# Patient Record
Sex: MALE | Race: BLACK OR AFRICAN AMERICAN | NOT HISPANIC OR LATINO | Employment: FULL TIME | URBAN - METROPOLITAN AREA
[De-identification: names, ages, dates, MRNs, and addresses within clinical notes are randomized per-mention and may not be internally consistent; named-entity substitution may affect disease eponyms.]

---

## 2017-08-23 ENCOUNTER — ALLSCRIPTS OFFICE VISIT (OUTPATIENT)
Dept: OTHER | Facility: OTHER | Age: 57
End: 2017-08-23

## 2017-08-23 ENCOUNTER — GENERIC CONVERSION - ENCOUNTER (OUTPATIENT)
Dept: OTHER | Facility: OTHER | Age: 57
End: 2017-08-23

## 2017-08-23 LAB
A/G RATIO (HISTORICAL): 1.8 (ref 1.2–2.2)
ALBUMIN SERPL BCP-MCNC: 4.6 G/DL (ref 3.5–5.5)
ALP SERPL-CCNC: 67 IU/L (ref 39–117)
ALT SERPL W P-5'-P-CCNC: 30 IU/L (ref 0–44)
AMYLASE (HISTORICAL): 110 U/L (ref 31–124)
AST SERPL W P-5'-P-CCNC: 27 IU/L (ref 0–40)
BASOPHILS # BLD AUTO: 0 %
BASOPHILS # BLD AUTO: 0 X10E3/UL (ref 0–0.2)
BILIRUB SERPL-MCNC: 0.7 MG/DL (ref 0–1.2)
BILIRUB UR QL STRIP: NORMAL
BUN SERPL-MCNC: 9 MG/DL (ref 6–24)
BUN/CREA RATIO (HISTORICAL): 8 (ref 9–20)
CALCIUM SERPL-MCNC: 9.6 MG/DL (ref 8.7–10.2)
CHLORIDE SERPL-SCNC: 101 MMOL/L (ref 96–106)
CHOLEST SERPL-MCNC: 232 MG/DL (ref 100–199)
CHOLEST/HDLC SERPL: 5 RATIO UNITS (ref 0–5)
CLARITY UR: NORMAL
CO2 SERPL-SCNC: 24 MMOL/L (ref 18–29)
COLOR UR: YELLOW
CREAT SERPL-MCNC: 1.06 MG/DL (ref 0.76–1.27)
DEPRECATED RDW RBC AUTO: 13.7 % (ref 12.3–15.4)
EGFR AFRICAN AMERICAN (HISTORICAL): 91 ML/MIN/1.73
EGFR-AMERICAN CALC (HISTORICAL): 79 ML/MIN/1.73
EOSINOPHIL # BLD AUTO: 0 %
EOSINOPHIL # BLD AUTO: 0 X10E3/UL (ref 0–0.4)
GLUCOSE (HISTORICAL): NORMAL
GLUCOSE SERPL-MCNC: 101 MG/DL (ref 65–99)
HCT VFR BLD AUTO: 44.1 % (ref 37.5–51)
HDLC SERPL-MCNC: 46 MG/DL
HGB BLD-MCNC: 15.2 G/DL (ref 12.6–17.7)
HGB UR QL STRIP.AUTO: NORMAL
IMM.GRANULOCYTES (CD4/8) (HISTORICAL): 0 %
IMM.GRANULOCYTES (CD4/8) (HISTORICAL): 0 X10E3/UL (ref 0–0.1)
KETONES UR STRIP-MCNC: NORMAL MG/DL
LDLC SERPL CALC-MCNC: 161 MG/DL (ref 0–99)
LEUKOCYTE ESTERASE UR QL STRIP: NORMAL
LIPASE SERPL-CCNC: 64 U/L (ref 0–59)
LYMPHOCYTES # BLD AUTO: 2.3 X10E3/UL (ref 0.7–3.1)
LYMPHOCYTES # BLD AUTO: 48 %
MAGNESIUM SERPL-MCNC: 2.2 MG/DL (ref 1.6–2.3)
MCH RBC QN AUTO: 29.2 PG (ref 26.6–33)
MCHC RBC AUTO-ENTMCNC: 34.5 G/DL (ref 31.5–35.7)
MCV RBC AUTO: 85 FL (ref 79–97)
MONOCYTES # BLD AUTO: 0.4 X10E3/UL (ref 0.1–0.9)
MONOCYTES (HISTORICAL): 9 %
NEUTROPHILS # BLD AUTO: 2.1 X10E3/UL (ref 1.4–7)
NEUTROPHILS # BLD AUTO: 43 %
NITRITE UR QL STRIP: NORMAL
PH UR STRIP.AUTO: 5 [PH]
PLATELET # BLD AUTO: 218 X10E3/UL (ref 150–379)
POTASSIUM SERPL-SCNC: 4.5 MMOL/L (ref 3.5–5.2)
PROSTATE SPECIFIC ANTIGEN (HISTORICAL): 3.1 NG/ML (ref 0–4)
PROT UR STRIP-MCNC: NORMAL MG/DL
RBC (HISTORICAL): 5.21 X10E6/UL (ref 4.14–5.8)
SODIUM SERPL-SCNC: 140 MMOL/L (ref 134–144)
SP GR UR STRIP.AUTO: 1.01
TOT. GLOBULIN, SERUM (HISTORICAL): 2.6 G/DL (ref 1.5–4.5)
TOTAL PROTEIN (HISTORICAL): 7.2 G/DL (ref 6–8.5)
TRIGL SERPL-MCNC: 125 MG/DL (ref 0–149)
UROBILINOGEN UR QL STRIP.AUTO: NORMAL
VLDLC SERPL CALC-MCNC: 25 MG/DL (ref 5–40)
WBC # BLD AUTO: 4.8 X10E3/UL (ref 3.4–10.8)

## 2017-08-24 LAB
INTERPRETATION (HISTORICAL): NORMAL
TSH SERPL DL<=0.05 MIU/L-ACNC: 2.2 UIU/ML (ref 0.45–4.5)

## 2017-08-26 ENCOUNTER — GENERIC CONVERSION - ENCOUNTER (OUTPATIENT)
Dept: OTHER | Facility: OTHER | Age: 57
End: 2017-08-26

## 2017-08-26 LAB
TESTOSTERONE FREE (HISTORICAL): 8.5 PG/ML (ref 7.2–24)
TESTOSTERONE TOTAL (HISTORICAL): 448 NG/DL (ref 264–916)

## 2017-12-22 ENCOUNTER — ALLSCRIPTS OFFICE VISIT (OUTPATIENT)
Dept: OTHER | Facility: OTHER | Age: 57
End: 2017-12-22

## 2017-12-26 ENCOUNTER — APPOINTMENT (OUTPATIENT)
Dept: RADIOLOGY | Facility: CLINIC | Age: 57
End: 2017-12-26
Payer: COMMERCIAL

## 2017-12-26 ENCOUNTER — TRANSCRIBE ORDERS (OUTPATIENT)
Dept: RADIOLOGY | Facility: CLINIC | Age: 57
End: 2017-12-26

## 2017-12-26 ENCOUNTER — GENERIC CONVERSION - ENCOUNTER (OUTPATIENT)
Dept: OTHER | Facility: OTHER | Age: 57
End: 2017-12-26

## 2017-12-26 DIAGNOSIS — R76.11 NONSPECIFIC REACTION TO TUBERCULIN SKIN TEST WITHOUT ACTIVE TUBERCULOSIS: ICD-10-CM

## 2017-12-26 PROCEDURE — 71020 HB CHEST X-RAY 2VW FRONTAL&LATL: CPT

## 2018-01-08 ENCOUNTER — ALLSCRIPTS OFFICE VISIT (OUTPATIENT)
Dept: OTHER | Facility: OTHER | Age: 58
End: 2018-01-08

## 2018-01-09 NOTE — PROGRESS NOTES
Assessment   1  Positive PPD (795 51) (R76 11)   2  Benign essential hypertension (401 1) (I10)   3  Body mass index (BMI) of 30 0 to 39 9 (278 00)    Plan   Benign essential hypertension    · AmLODIPine Besylate 5 MG Oral Tablet; 1 every day   · Begin or continue regular aerobic exercise  Gradually work up to at least 3 sessions of    30 minutes of exercise a week ; Status:Complete;   Done: 89TYC8739   · Keep a diary of when and what you eat ; Status:Complete;   Done: 81JVY8329   · Limit your use of alcohol to 2 drinks or cans of beer a day ; Status:Complete;   Done:    31NSC9228   · Some eating tips that can help you lose weight ; Status:Complete;   Done: 24WVC8578   · Take your blood pressure twice a week  Record the numbers and bring them with you to    your appointments ; Status:Complete;   Done: 32EGW6325   · You need to quit smoking ; Status:Complete;   Done: 29YIQ8369   · Call [phone] if: You become dizzy or lightheaded, especially when you stand up    after sitting for a while ; Status:Complete;   Done: 74QWE6988   · Call [phone] if: You develop double vision (see two of everything) ;    Status:Complete;   Done: 63CJF4495   · Call [phone] if: Your blood pressure is frequently higher than 140/90 ;    Status:Complete;   Done: 18REQ5588   · Call 911 if: You experience a new kind of chest pain (angina) or pressure ;    Status:Complete;   Done: 31SUG7378   · Call 911 if: You have any symptoms of a stroke ; Status:Complete;   Done: 95THL7926   · Seek Immediate Medical Attention if: You have a severe headache that will not go away ;    Status:Complete;   Done: 71QKA6200   · Seek Immediate Medical Attention if: Your blood pressure is greater than 250/120 for 2    consecutive readings ; Status:Complete;   Done: 10CHV3118    Discussion/Summary      Cxr neg-copy given patient for records and employer if needed      Possible side effects of new medications were reviewed with the patient/guardian today  The treatment plan was reviewed with the patient/guardian  The patient/guardian understands and agrees with the treatment plan      Chief Complaint   pt here to discuss xrays  tc/cma      History of Present Illness   57 yo pt -follow-up on cxr--ordered sec to +PPD/hx htn    Pt asymptomatic--no fever, no nigh tsweats, no cough  known exposure--done as part of possible pre-employment  Pt non-smoker  Active Problems   1  Abnormal glucose (790 29) (R73 09)   2  Accelerated essential hypertension (401 0) (I10)   3  Anxiety (300 00) (F41 9)   4  Benign essential hypertension (401 1) (I10)   5  Body mass index (BMI) of 30 0 to 39 9 (278 00)   6  Diabetes mellitus type II, controlled (250 00) (E11 9)   7  Encounter for PPD test (V74 1) (Z11 1)   8  Erectile dysfunction (607 84) (N52 9)   9  Hyperlipidemia (272 4) (E78 5)   10  Obesity (278 00) (E66 9)   11  Positive PPD (795 51) (R76 11)    Family History   Mother    1  Family history of hypertension (V17 49) (Z82 49)  Father    2  Family history of ESRD (end stage renal disease)   3  Family history of dementia (V17 2) (Z81 8)   4  Family history of diabetes mellitus (V18 0) (Z83 3)    Social History    ·    · Never a smoker   · No alcohol use   · No drug use    Current Meds    1  AmLODIPine Besylate 5 MG Oral Tablet; 1 every day; Last Rx:72Xrf1693 Ordered   2  Centrum Ultra Mens Oral Tablet; Therapy: (Recorded:90Sbb2525) to Recorded   3  Cialis 10 MG Oral Tablet; take 1 tablet 1 hour before activity as needed; Therapy: 12Yzq3395 to (Last [place])  Requested for: 85Avu2336 Ordered     The medication list was reviewed and updated today  Allergies   1   Zithromax Z-Samir CAPS    Vitals   Vital Signs    Recorded: 86GVX5861 11:14AM Recorded: 40YAK0118 10:57AM   Temperature  97 6 F, Temporal   Heart Rate  78, L Radial   Pulse Quality  Normal, L Radial   Respiration Quality  Normal   Respiration  16   Systolic 050, LUE, Sitting 142, LUE, Sitting   Diastolic 86, LUE, Sitting 92, LUE, Sitting   BP CUFF SIZE Large    Height  5 ft 10 5 in   Weight  235 lb    BMI Calculated  33 24   BSA Calculated  2 25     Physical Exam        Constitutional      General appearance: No acute distress, well appearing and well nourished  overweight  Pulmonary      Respiratory effort: No increased work of breathing or signs of respiratory distress  Auscultation of lungs: Clear to auscultation, equal breath sounds bilaterally, no wheezes, no rales, no rhonci  Cardiovascular      Auscultation of heart: Normal rate and rhythm, normal S1 and S2, without murmurs  Neurologic      Cranial nerves: Cranial nerves 2-12 intact         Psychiatric      Orientation to person, place and time: Normal           Signatures    Electronically signed by : SONIDO Collins ; Jan 8 2018  5:36PM EST                       (Author)

## 2018-01-12 VITALS
RESPIRATION RATE: 16 BRPM | HEART RATE: 76 BPM | SYSTOLIC BLOOD PRESSURE: 124 MMHG | HEIGHT: 71 IN | TEMPERATURE: 97.8 F | DIASTOLIC BLOOD PRESSURE: 84 MMHG | WEIGHT: 231 LBS | BODY MASS INDEX: 32.34 KG/M2

## 2018-01-12 NOTE — RESULT NOTES
Verified Results  (1) TESTOSTERONE, FREE (DIRECT) AND TOTAL 46Qzu7006 11:57AM Jennifer Nuno     Test Name Result Flag Reference   Testosterone, Total, LC/ 0 ng/dL  264 0-916 0   This [facility] LC/MS-MS method is currently certified by the [facility]  Hormone Standardization Program (HoSt)  Adult male reference  interval is based on a population of healthy nonobese males  (BMI <30) between 23and 44years old  [address] [place] [address], [address] [place] [address]  [phone]  PMID: [id number]  This test was developed and its performance characteristics  determined by [facility]  It has not been cleared or approved  by the Food and Drug Administration     Free Testosterone(Direct) 8 5 pg/mL  7 2-24 0

## 2018-01-12 NOTE — PROGRESS NOTES
Assessment    1  Encounter for annual physical exam (V70 0) (Z00 00)   2  Body mass index (BMI) of 30 0 to 39 9 (278 00) (E66 9)   3  Hyperlipidemia (272 4) (E78 5)   4  Benign essential hypertension (401 1) (I10)    Plan  Abnormal glucose, Anxiety, Benign essential hypertension, Hyperlipidemia    · (1) AMYLASE; Status:Active; Requested for:23Ppa6282;    · (1) CBC/PLT/DIFF; Status:Active; Requested for:83Sex9507;    · (1) COMPREHENSIVE METABOLIC PANEL; Status:Active; Requested for:17Eyt3750;    · (1) HEMOGLOBIN A1C; Status:Active; Requested for:50Cbt7293;    · (1) LIPASE; Status:Active; Requested for:59Rel0237;    · (1) LIPID PANEL, FASTING; Status:Active; Requested for:24Fuf5077;    · (1) TSH; Status:Active; Requested ZONIA:19TKA6723; Abnormal glucose, Anxiety, Benign essential hypertension, Hyperlipidemia, Screening  for prostate cancer    · (1) PSA (SCREEN) (Dx V76 44 Screen for Prostate Cancer); Status:Active; Requested  BMP:48VYG2893; Health Maintenance    · Urine Dip Automated- POC; Status:Complete;   Done: 91DMD2198 11:14AM  Screening for colon cancer    · 1 Ramona Stoll MD, [facility] (Gastroenterology) Physician Referral  Consult  Status: Active   Requested for: 43WAC1811  Care Summary provided  : Yes    Discussion/Summary  Impression: health maintenance visit  Currently, he eats an adequate diet and has an adequate exercise regimen  Prostate cancer screening: the risks and benefits of prostate cancer screening were discussed  Testicular cancer screening: the risks and benefits of testicular cancer screening were discussed  Colorectal cancer screening: the risks and benefits of colorectal cancer screening were discussed  Screening lab work includes glucose, lipid profile and urinalysis  The risks and benefits of immunizations were discussed  Advice and education were given regarding weight loss, cardiovascular risk reduction and advanced directive planning  Patient discussion: discussed with the patient  Possible side effects of new medications were reviewed with the patient/guardian today  Chief Complaint  pt here for PE  tc/cma      History of Present Illness  HM, Adult Male: The patient is being seen for a health maintenance evaluation  General Health: The patient's health since the last visit is described as good  He has regular dental visits  He complains of vision problems  He denies hearing loss  Lifestyle:  He has weight concerns  He exercises regularly  He does not use tobacco  He denies alcohol use  He denies drug use  Reproductive health:  the patient is sexually active  Screening: cancer screening reviewed and updated  metabolic screening reviewed and updated  risk screening reviewed and updated  Review of Systems    The patient presents with complaints of gradual onset of 20 - 29 pound recent weight loss (intentional)  Eyes: eyesight problems  ENT: no complaints of earache, no hearing loss, no nosebleeds, no nasal discharge, no sore throat, no hoarseness  Cardiovascular: No complaints of slow heart rate, no fast heart rate, no chest pain, no palpitations, no leg claudication, no lower extremity  Respiratory: No complaints of shortness of breath, no wheezing, no cough, no SOB on exertion, no orthopnea or PND  Gastrointestinal: No complaints of abdominal pain, no constipation, no nausea or vomiting, no diarrhea or bloody stools  Genitourinary: No complaints of dysuria, no incontinence, no hesitancy, no nocturia, no genital lesion, no testicular pain  Musculoskeletal: arthralgias and myalgias  Integumentary: No complaints of skin rash or skin lesions, no itching, no skin wound, no dry skin  Neurological: No compliants of headache, no confusion, no convulsions, no numbness or tingling, no dizziness or fainting, no limb weakness, no difficulty walking  Psychiatric: anxiety and sleep disturbances     Endocrine: No complaints of proptosis, no hot flashes, no muscle weakness, no erectile dysfunction, no deepening of the voice, no feelings of weakness  Hematologic/Lymphatic: No complaints of swollen glands, no swollen glands in the neck, does not bleed easily, no easy bruising  Active Problems    1  Abnormal glucose (790 29) (R73 09)   2  Anxiety (300 00) (F41 9)   3  Benign essential hypertension (401 1) (I10)   4  Body mass index (BMI) of 30 0 to 39 9 (278 00) (E66 9)   5  Diabetes mellitus type II, controlled (250 00) (E11 9)   6  Encounter for annual physical exam (V70 0) (Z00 00)   7  Hyperlipidemia (272 4) (E78 5)   8  Obesity (278 00) (E66 9)   9  Screening for colon cancer (V76 51) (Z12 11)   10  Screening for prostate cancer (V76 44) (Z12 5)    Past Medical History    · History of Obesity, Class II, BMI 35 0-39 9, with comorbidity (see actual BMI) (278 01)  (E66 01)    Family History  Mother    · Family history of hypertension (V17 49) (Z82 49)  Father    · Family history of ESRD (end stage renal disease)   · Family history of dementia (V17 2) (Z81 8)   · Family history of diabetes mellitus (V18 0) (Z83 3)    Social History    ·    · Never a smoker   · No alcohol use   · No drug use    Current Meds   1  Centrum Ultra Mens Oral Tablet; Therapy: (Recorded:84Kpo4076) to Recorded    Allergies    1  Zithromax Z-Samir CAPS    Vitals   Recorded: 97WKM1148 10:59AM   Temperature 97 8 F, Tympanic   Heart Rate 80, R Radial   Pulse Quality Regular, R Radial   Respiration 16   Respiration Quality Normal   Systolic 640, RUE, Sitting   Diastolic 86, RUE, Sitting   Height 5 ft 10 5 in   Weight 213 lb    BMI Calculated 30 13   BSA Calculated 2 16     Physical Exam    Constitutional   General appearance: No acute distress, well appearing and well nourished  overweight  Eyes   Conjunctiva and lids: No erythema, swelling or discharge  Pupils and irises: Equal, round, reactive to light      Ears, Nose, Mouth, and Throat   External inspection of ears and nose: Normal  Otoscopic examination: Tympanic membranes translucent with normal light reflex  Canals patent without erythema  Hearing: Normal     Nasal mucosa, septum, and turbinates: Normal without edema or erythema  Lips, teeth, and gums: Normal, good dentition  Oropharynx: Normal with no erythema, edema, exudate or lesions  Neck   Neck: Supple, symmetric, trachea midline, no masses  Thyroid: Normal, no thyromegaly  Pulmonary   Respiratory effort: No increased work of breathing or signs of respiratory distress  Auscultation of lungs: Clear to auscultation  Cardiovascular   Auscultation of heart: Normal rate and rhythm, normal S1 and S2, no murmurs  Pedal pulses: 2+ bilaterally  Examination of extremities for edema and/or varicosities: Normal     Abdomen   Abdomen: Non-tender, no masses  Liver and spleen: No hepatomegaly or splenomegaly  Examination for hernias: No hernias appreciated  Lymphatic   Palpation of lymph nodes in neck: No lymphadenopathy  Palpation of lymph nodes in axillae: No lymphadenopathy  Musculoskeletal   Gait and station: Normal     Inspection/palpation of digits and nails: Normal without clubbing or cyanosis  Inspection/palpation of joints, bones, and muscles: Normal     Range of motion: Normal     Stability: Normal     Muscle strength/tone: Normal     Skin   Skin and subcutaneous tissue: Normal without rashes or lesions  Neurologic   Cranial nerves: Cranial nerves 2-12 intact  Reflexes: 2+ and symmetric  Sensation: No sensory loss  Psychiatric   Judgment and insight: Normal     Orientation to person, place and time: Normal     Recent and remote memory: Intact      Mood and affect: Normal        Results/Data  Urine Dip Automated- POC 34TIS2056 11:14AM [facility]     Test Name Result Flag Reference   Color Clear     Clarity Transparent     Leukocytes neg     Nitrite neg     Blood neg     Bilirubin neg     Urobilinogen norm     Protein neg Ph 5     Specific Gravity 1 020     Ketone neg     Glucose norm       Prime MD Depression Screening 63JCA0460 11:04AM User, Ahs     Test Name Result Flag Reference   PRIME-MD Depression Screening 0/9 - Likely not MD     Depressed mood: No  Loss of interest: No       Procedure    Procedure:  pt goes to eye DR yearly        Signatures   Electronically signed by : SONIDO Esqueda ; May  8 2016  8:31PM EST                       (Author)

## 2018-01-13 NOTE — PROGRESS NOTES
Assessment    1  Encounter for preventive health examination (V70 0) (Z00 00)   2  Benign essential hypertension (401 1) (I10)   3  Hyperlipidemia (272 4) (E78 5)   4  Erectile dysfunction (607 84) (N52 9)   5  Anxiety (300 00) (F41 9)   6  Body mass index (BMI) of 30 0 to 39 9 (278 00)   7  Screening for colon cancer (V76 51) (Z12 11)    Plan   Erectile dysfunction    · Viagra 50 MG Oral Tablet   · Cialis 10 MG Oral Tablet; take 1 tablet 1 hour before activity as needed  Health Maintenance    · *VB - Foot Exam; Status:Complete;   Done: 49OJE7054 09:40AM   · Urine Dip Automated- POC; Status:Complete;   Done: 79IQH1099 09:48AM  Screening for colon cancer    · (1) COLOGUARD; Status:Active; Requested for:33Mix0016;   cont  : I authorize [address] to obtain reimbursement for      Cologuard and to directly contact and collect a second sample from the paient      if reportable results are not obtained from the initial sample   cont  : I accept responsibility for maintaining the privacy of test results and      related information as required by HIPAA   : By ordering Cologuard, I certify that I am a licensed medical professional      authorized to order Cologuard  I acknowledge that the test is medically necessary and      that the patient is eligible to use Cologuard  AmLODIPine Besylate 5 MG Oral Tablet; 1 every day; Last Rx:26Rng2510; Status: ACTIVE Ordered  Rx By: Erroll Pencil; Dispense: 0 Days ; #:90 Tablet; Refill: 1;   For: Benign essential hypertension; ROBIN = N; Call Rx  (1) HEMOGLOBIN A1C; Status:Resulted - Requires Verification;   Done: 65YTE1311 12:00AM  WOJ:42FTC8334; Ordered; For:Diabetes mellitus type II, controlled; Ordered By:Margy Godoy;   (1) AMYLASE; Status:Resulted - Requires Verification;   Done: 99WXP9709 12:00AM  QMC:38WPW3759; Ordered; For:Anxiety, Benign essential hypertension, Health Maintenance, Erectile dysfunction, Hyperlipidemia, Obesity;  Ordered Juanita Mitchell;   (1) CBC/PLT/DIFF; Status:Resulted - Requires Verification;   Done: 86MXV8455 12:00AM  EMZ:16SVP2222; Ordered; For:Anxiety, Benign essential hypertension, Health Maintenance, Erectile dysfunction, Hyperlipidemia, Obesity; Ordered By:Letty Godoy;   (1) COMPREHENSIVE METABOLIC PANEL; Status:Resulted - Requires Verification;   Done: 32MOC8304 12:00AM  WNA:38SBO3347; Ordered; For:Anxiety, Benign essential hypertension, Health Maintenance, Erectile dysfunction, Hyperlipidemia, Obesity; Ordered By:Margy Godoy;   (1) LIPASE; Status:Resulted - Requires Verification;   Done: 74LYU8431 12:00AM  CER:39WXB8177; Ordered; For:Anxiety, Benign essential hypertension, Health Maintenance, Erectile dysfunction, Hyperlipidemia, Obesity; Ordered By:Margy Godoy;   (1) LIPID PANEL, FASTING; Status:Resulted - Requires Verification;   Done: 50HKA3933 12:00AM  WNE:62NSY3523; Ordered; For:Anxiety, Benign essential hypertension, Health Maintenance, Erectile dysfunction, Hyperlipidemia, Obesity; Ordered By:Margy Godoy;   (1) MAGNESIUM; Status:Resulted - Requires Verification;   Done: 87LFX9522 12:00AM  ZWV:52ANF4317; Ordered; For:Anxiety, Benign essential hypertension, Health Maintenance, Erectile dysfunction, Hyperlipidemia, Obesity; Ordered By:Margy Godoy;   (1) PSA (SCREEN) (Dx V76 44 Screen for Prostate Cancer); Status:Resulted - Requires Verification;   Done: 65TDB4014 12:00AM  UNS:55SWA6551; Ordered; For:Anxiety, Benign essential hypertension, Health Maintenance, Erectile dysfunction, Hyperlipidemia, Obesity, Screening for prostate cancer; Ordered By:Margy Godoy;   (1) TESTOSTERONE, FREE (DIRECT) AND TOTAL; Status:Resulted - Requires Verification;   Done: 50HWF5264 12:00AM  Due:46Gfe1375; Ordered; For:Anxiety, Benign essential hypertension, Health Maintenance, Erectile dysfunction, Hyperlipidemia, Obesity;  Ordered By:Margy Godoy;   (1) TSH WITH FT4 REFLEX; Status:Resulted - Requires Verification;   Done: 40NMD0830 12:00AM  RUE:82PMG8284; Ordered; For:Anxiety, Benign essential hypertension, Health Maintenance, Erectile dysfunction, Hyperlipidemia, Obesity; Ordered By:Zulay Godoy;      Discussion/Summary  health maintenance visit eats an adequate diet Currently, he has an adequate exercise regimen  Prostate cancer screening: the risks and benefits of prostate cancer screening were discussed  Testicular cancer screening: the risks and benefits of testicular cancer screening were discussed  Colorectal cancer screening: the risks and benefits of colorectal cancer screening were discussed  Screening lab work includes glucose, 25-hydroxyvitamin D, urinalysis and lipid profile  The risks and benefits of immunizations were discussed  Advice and education were given regarding cardiovascular risk reduction, seat belt use and weight loss  Patient discussion: discussed with the patient  The patient was counseled on Hepatitis C screening  Possible side effects of new medications were reviewed with the patient/guardian today  The treatment plan was reviewed with the patient/guardian  The patient/guardian understands and agrees with the treatment plan      Chief Complaint  pt here for PE need refill  tc/cma      History of Present Illness  HM, Adult Male: The patient is being seen for a health maintenance evaluation  The last health maintenance visit was 1 year(s) ago  General Health: The patient's health since the last visit is described as fair  He has regular dental visits  He complains of vision problems  Lifestyle:  He has weight concerns  He does not exercise regularly  He does not use tobacco  He denies alcohol use  He denies drug use  Reproductive health:  the patient is sexually active  He complains of erectile dysfunction  Screening: cancer screening reviewed and updated  metabolic screening reviewed and updated     risk screening reviewed and updated  Review of Systems    Constitutional: feeling tired  Eyes: eyesight problems  ENT: no complaints of earache, no hearing loss, no nosebleeds, no nasal discharge, no sore throat, no hoarseness  Cardiovascular: htn  Respiratory: No complaints of shortness of breath, no wheezing, no cough, no SOB on exertion, no orthopnea or PND  Gastrointestinal: No complaints of abdominal pain, no constipation, no nausea or vomiting, no diarrhea or bloody stools  Genitourinary: No complaints of dysuria, no incontinence, no hesitancy, no nocturia, no genital lesion, no testicular pain  Musculoskeletal: arthralgias and myalgias  Integumentary: skin lesion  Neurological: No compliants of headache, no confusion, no convulsions, no numbness or tingling, no dizziness or fainting, no limb weakness, no difficulty walking  Psychiatric: anxiety, sleep disturbances and depression, but not suicidal    Endocrine: No complaints of proptosis, no hot flashes, no muscle weakness, no erectile dysfunction, no deepening of the voice, no feelings of weakness  Hematologic/Lymphatic: No complaints of swollen glands, no swollen glands in the neck, does not bleed easily, no easy bruising  Active Problems    1  Abnormal glucose (790 29) (R73 09)   2  Accelerated essential hypertension (401 0) (I10)   3  Anxiety (300 00) (F41 9)   4  Benign essential hypertension (401 1) (I10)   5  Body mass index (BMI) of 30 0 to 39 9 (278 00)   6  Diabetes mellitus type II, controlled (250 00) (E11 9)   7  Encounter for annual physical exam (V70 0) (Z00 00)   8  Erectile dysfunction (607 84) (N52 9)   9  Hyperlipidemia (272 4) (E78 5)   10  Obesity (278 00) (E66 9)   11  Screening for colon cancer (V76 51) (Z12 11)   12   Screening for prostate cancer (V76 44) (Z12 5)    Past Medical History    · History of Obesity, Class II, BMI 35 0-39 9, with comorbidity (see actual BMI) (278 00)  (E66 9)    Family History  Mother    · Family history of hypertension (V17 49) (Z82 49)  Father    · Family history of ESRD (end stage renal disease)   · Family history of dementia (V17 2) (Z81 8)   · Family history of diabetes mellitus (V18 0) (Z83 3)    Social History    ·    · Never a smoker   · No alcohol use   · No drug use    Current Meds   1  AmLODIPine Besylate 5 MG Oral Tablet; 1 every day; Last Rx:42Nqr9027 Ordered   2  Centrum Ultra Mens Oral Tablet; Therapy: (Recorded:10Qdf3660) to Recorded   3  Viagra 50 MG Oral Tablet; take one tablet one hour before needed up to once daily    May   reduce dose to 25mg or increae to max of 100mg; Therapy: 76KHN0311 to (Last Rx:60Zus7532)  Requested for: 52Evc3589 Ordered    Allergies    1  Zithromax Z-Samir CAPS    Vitals   Recorded: 23Aug2017 09:33AM   Temperature 97 8 F, Temporal   Heart Rate 76, R Radial   Pulse Quality Normal, R Radial   Respiration Quality Normal   Respiration 16   Systolic 765, RUE, Sitting   Diastolic 84, RUE, Sitting   Height 5 ft 10 5 in   Weight 231 lb    BMI Calculated 32 68   BSA Calculated 2 23     Physical Exam    Constitutional   General appearance: No acute distress, well appearing and well nourished  overweight  Eyes   Conjunctiva and lids: No erythema, swelling or discharge  Pupils and irises: Equal, round, reactive to light  Ears, Nose, Mouth, and Throat   External inspection of ears and nose: Normal     Otoscopic examination: Tympanic membranes translucent with normal light reflex  Canals patent without erythema  Hearing: Normal     Nasal mucosa, septum, and turbinates: Normal without edema or erythema  Lips, teeth, and gums: Normal, good dentition  Oropharynx: Normal with no erythema, edema, exudate or lesions  Neck   Neck: Supple, symmetric, trachea midline, no masses  Thyroid: Normal, no thyromegaly  Pulmonary   Respiratory effort: No increased work of breathing or signs of respiratory distress      Auscultation of lungs: Clear to auscultation  Cardiovascular   Auscultation of heart: Normal rate and rhythm, normal S1 and S2, no murmurs  Carotid pulses: 2+ bilaterally  Abdominal aorta: Normal     Pedal pulses: 2+ bilaterally  Examination of extremities for edema and/or varicosities: Normal     Abdomen   Abdomen: Non-tender, no masses  Liver and spleen: No hepatomegaly or splenomegaly  Examination for hernias: No hernias appreciated  Lymphatic   Palpation of lymph nodes in neck: No lymphadenopathy  Musculoskeletal   Gait and station: Normal     Inspection/palpation of digits and nails: Normal without clubbing or cyanosis  Inspection/palpation of joints, bones, and muscles: Normal     Range of motion: Normal     Stability: Normal     Muscle strength/tone: Normal     Skin   Skin and subcutaneous tissue: Normal without rashes or lesions  Neurologic   Cranial nerves: Cranial nerves 2-12 intact  Reflexes: 2+ and symmetric  Sensation: No sensory loss  Psychiatric   Orientation to person, place and time: Normal     Mood and affect: Normal   Mood and Affect: anxious        Results/Data  Urine Dip Automated- POC 47Jwm6607 09:48AM [facility]     Test Name Result Flag Reference   Color Yellow     Clarity Transparent     Leukocytes neg     Nitrite neg     Blood neg     Bilirubin neg     Urobilinogen norm     Protein neg     Ph 5     Specific Gravity 1 015     Ketone neg     Glucose norm       *VB - Foot Exam 99Hxc6757 09:40AM Leim Honey     Test Name Result Flag Reference   FOOT Macario Nuñez [id number]         Procedure    Procedure: Visual Acuity Test    Results: 20/30 in both eyes with corrective device, 20/30 in the right eye with corrective device, 20/40 in the left eye with corrective device      Signatures   Electronically signed by : SONIDO Vaughan ; Aug 27 2017  9:47AM EST                       (Author)

## 2018-01-16 NOTE — RESULT NOTES
Verified Results  (1) HEMOGLOBIN A1C 71Int3504 09:51AM [facility]     Test Name Result Flag Reference   HEMOGLOBIN A1C 5 9         Plan  Anxiety, Benign essential hypertension, Health Maintenance, Erectile dysfunction,  Hyperlipidemia, Obesity    · (1) AMYLASE; Status:Active; Requested for:23Aug2017;    · (1) CBC/PLT/DIFF; Status:Active; Requested for:25Vyl6981;    · (1) COMPREHENSIVE METABOLIC PANEL; Status:Active; Requested for:78Heq2297;    · (1) LIPASE; Status:Active; Requested for:22Mbu9742;    · (1) LIPID PANEL, FASTING; Status:Active; Requested for:23Aug2017;    · (1) MAGNESIUM; Status:Active; Requested for:23Aug2017;    · (1) TESTOSTERONE, FREE (DIRECT) AND TOTAL; Status:Active; Requested  for:23Aug2017;    · (1) TSH WITH FT4 REFLEX; Status:Active; Requested for:23Aug2017; Anxiety, Benign essential hypertension, Health Maintenance, Erectile dysfunction,  Hyperlipidemia, Obesity, Screening for prostate cancer    · (1) PSA (SCREEN) (Dx V76 44 Screen for Prostate Cancer); Status:Active; Requested  for:23Aug2017;   Diabetes mellitus type II, controlled    · (1) HEMOGLOBIN A1C; Status:Resulted - Requires Verification;   Done: 51XFF5766  09:51AM  Erectile dysfunction    · Viagra 50 MG Oral Tablet   · Cialis 10 MG Oral Tablet; take 1 tablet 1 hour before activity as needed  Health Maintenance    · *VB - Foot Exam; Status:Complete;   Done: 36POW5689 09:40AM   · Urine Dip Automated- POC; Status:Complete;   Done: 87PRL6696 09:48AM  Screening for colon cancer    · (1) COLOGUARD; Status:Active;  Requested for:23Aug2017;   cont  : I authorize [facility] 3 to obtain reimbursement for      Cologuard and to directly contact and collect a second sample from the paient      if reportable results are not obtained from the initial sample   cont  : I accept responsibility for maintaining the privacy of test results and      related information as required by HIPAA   : By ordering Cologuard, I certify that I am a licensed medical professional      authorized to order Cologuard  I acknowledge that the test is medically necessary and      that the patient is eligible to use Cologuard

## 2018-01-17 NOTE — RESULT NOTES
Verified Results  (1) AMYLASE 89Vxv2188 11:57AM [facility]     Test Name Result Flag Reference   Amylase, Serum 110 U/L       (1) CBC/PLT/DIFF 21Qay1442 11:57AM [facility]     Test Name Result Flag Reference   WBC 4 8 x10E3/uL  3 4-10 8   RBC 5 21 x10E6/uL  4 14-5 80   Hemoglobin 15 2 g/dL  12 6-17 7   Hematocrit 44 1 %  37 5-51 0   MCV 85 fL  79-97   MCH 29 2 pg  26 6-33 0   MCHC 34 5 g/dL  31 5-35 7   RDW 13 7 %  12 3-15 4   Platelets 788 F75K4/TB  150-379   Neutrophils 43 %     Lymphs 48 %     Monocytes 9 %     Eos 0 %     Basos 0 %     Neutrophils (Absolute) 2 1 x10E3/uL  1 4-7 0   Lymphs (Absolute) 2 3 x10E3/uL  0 7-3 1   Monocytes(Absolute) 0 4 x10E3/uL  0 1-0 9   Eos (Absolute) 0 0 x10E3/uL  0 0-0 4   Baso (Absolute) 0 0 x10E3/uL  0 0-0 2   Immature Granulocytes 0 %     Immature Grans (Abs) 0 0 x10E3/uL  0 0-0 1     (1) COMPREHENSIVE METABOLIC PANEL 52VZD9893 35:52FZ [facility]     Test Name Result Flag Reference   Glucose, Serum 101 mg/dL H 65-99   BUN 9 mg/dL  6-24   Creatinine, Serum 1 06 mg/dL  0 76-1 27   BUN/Creatinine Ratio 8 L 9-20   Sodium, Serum 140 mmol/L  134-144   Potassium, Serum 4 5 mmol/L  3 5-5 2   Chloride, Serum 101 mmol/L     Carbon Dioxide, Total 24 mmol/L  18-29   Calcium, Serum 9 6 mg/dL  8 7-10 2   Protein, Total, Serum 7 2 g/dL  6 0-8 5   Albumin, Serum 4 6 g/dL  3 5-5 5   Globulin, Total 2 6 g/dL  1 5-4 5   A/G Ratio 1 8  1 2-2 2   Bilirubin, Total 0 7 mg/dL  0 0-1 2   Alkaline Phosphatase, S 67 IU/L     AST (SGOT) 27 IU/L  0-40   ALT (SGPT) 30 IU/L  0-44   eGFR If NonAfricn Am 79 mL/min/1 73  >59   eGFR If Africn Am 91 mL/min/1 73  >59     (1) LIPASE 23Aug2017 11:57AM [facility]     Test Name Result Flag Reference   Lipase, Serum 64 U/L H 0-59     (1) LIPID PANEL, FASTING 23Aug2017 11:57AM [facility]     Test Name Result Flag Reference   Cholesterol, Total 232 mg/dL H 100-199   Triglycerides 125 mg/dL  0-149   HDL Cholesterol 46 mg/dL  >39   VLDL Cholesterol Doc 25 mg/dL  5-40   LDL Cholesterol Calc 161 mg/dL H 0-99   T  Chol/HDL Ratio 5 0 ratio units  0 0-5 0   T  Chol/HDL Ratio                                                             Men  Women                                               1/2 Avg  Risk  3 4    3 3                                                   Avg Risk  5 0    4 4                                                2X Avg  Risk  9 6    7 1                                                3X Avg  Risk 23 4   11 0     (1) MAGNESIUM 53Juz4659 11:57AM [facility]     Test Name Result Flag Reference   Magnesium, Serum 2 2 mg/dL  1 6-2 3     (1) PSA (SCREEN) (Dx V76 44 Screen for Prostate Cancer) 91Skr6016 11:57AM [facility]     Test Name Result Flag Reference   Prostate Specific Ag, Serum 3 1 ng/mL  0 0-4 0   Roche ECLIA methodology  According to the American Urological Association, Serum PSA should  decrease and remain at undetectable levels after radical  prostatectomy  The AUA defines biochemical recurrence as an initial  PSA value 0 2 ng/mL or greater followed by a subsequent confirmatory  PSA value 0 2 ng/mL or greater  Values obtained with different assay methods or kits cannot be used  interchangeably  Results cannot be interpreted as absolute evidence  of the presence or absence of malignant disease  (1) TSH WITH FT4 REFLEX 18Ksn2912 11:57AM [facility]     Test Name Result Flag Reference   TSH 2 200 uIU/mL  0 450-4 500     [facility]) Cardiovascular Risk Assessment 52Bue3228 11:57AM [facility]     Test Name Result Flag Reference   Interpretation Note     -------------------------------  CARDIOVASCULAR REPORT:  -------------------------------  Current available clinical information suggests the  patient's risk is at least LOW  One major CHD risk factor is  present (age over 39)  If the patient has CHD or a CHD risk  equivalent, the risk category is high   If patient does not  have CHD or a CHD risk equivalent, consider use of the  Pooled Cohort Equations to estimate 10-year CVD risk, as  individuals with greater than 7 5% risk may warrant more  intensive therapy  The calculator can be found at:  http://tools  cardiosource org/CVKEL-Salh-Xeuphoben/  -  Insulin resistance, obesity, excessive alcohol use, smoking,  nephrotic syndrome, liver disease, and certain medications  can cause secondary dyslipidemia  Consider evaluation if  clinically indicated  -  Therapeutic lifestyle changes are always valuable to achieve  optimal blood lipid status (diet, exercise, weight  management)  -------------------------------  LIPID MANAGEMENT  Select one patient risk category based upon medical history  and clinical judgment  Additional risk factors such as  personal or family history of premature CHD, smoking, and  hypertension modify a patient's goals of therapy  In CVD  prevention, the intensity of therapy should be adjusted to  the level of patient risk  MODERATE intensity statin therapy  generally results in an average LDL-C reduction of 30% to  less than 50% from the untreated baseline  Examples include  (daily doses): atorvastatin 10-20 mg, rosuvastatin 5-10 mg,  simvastatin 20-40 mg, pravastatin 40-80 mg, lovastatin 40  mg  HIGH intensity statin therapy generally results in an  average LDL-C reduction of 50% or more from the untreated  baseline  Examples include (daily doses): atorvastatin 40-80  mg and rosuvastatin 20 mg   -------------------------------  LOW RISK ASSESSMENT AND TREATMENT SUGGESTIONS  -------------------------------  LDL-C is high, was 142 and now is 161 mg/dL  Non-HDL  Cholesterol is acceptable, was 163 and now is 186 mg/dL  -  Consider statin therapy as elevated LDL-C may contribute to  increased CVD risk   If statin cannot be tolerated or  increased, alternatives include use of an intestinal agent  (ezetimibe or bile acid sequestrant) or niacin   -------------------------------  INTERMEDIATE RISK ASSESSMENT AND TREATMENT SUGGESTIONS  -------------------------------  LDL-C is high, was 142 and now is 161 mg/dL  Non-HDL  Cholesterol is borderline high, was 163 and now is 186  mg/dL  -  Begin statin  If statin already in use, consider increasing  dose  If statin cannot be tolerated or increased,  alternatives include use of an intestinal agent (ezetimibe  or bile acid sequestrant) or niacin   -------------------------------  HIGH RISK ASSESSMENT AND TREATMENT SUGGESTIONS  -------------------------------  LDL-C is high, was 142 and now is 161 mg/dL  Non-HDL  Cholesterol is high, was 163 and now is 186 mg/dL  -  Begin statin  If statin already in use, consider increasing  dose to achieve at least a 50% LDL reduction from baseline  Moderate or high intensity statin is preferred  If statin  cannot be tolerated or increased, alternatives include use  of an intestinal agent (ezetimibe or bile acid sequestrant)  or niacin   -------------------------------  DISCLAIMER  These assessments and treatment suggestions are provided as  a convenience in support of the physician-patient  relationship and are not intended to replace the physician's  clinical judgment  They are derived from the national  guidelines in addition to other evidence and expert opinion  The clinician should consider this information within the  context of clinical opinion and the individual patient  SEE GUIDANCE FOR CARDIOVASCULAR REPORT: National Heart,  Lung, and Blood Hamel's Third Report of the NCEP Expert  Panel on Detection, Evaluation and Treatment of High Blood  Cholesterol in Adults (ATP III) (2002  NIH publication  );  Anson et al  Diabetes Care 2008; 31(4):811-82;  Contois et al  Clin Chem 2009; 55(3):407-419; Clayton Telles et  al  2013 ACC/AHA guideline on the treatment of blood  cholesterol to reduce atherosclerotic cardiovascular risk in  adults: a report of the [address] [employer] Task Force on Practice  Guidelines  Circulation 9182;917(HSZZC 2):S1? S45  PDF Image          (1) HEMOGLOBIN A1C 23Jyd6426 09:51AM Jennifer Nuno     Test Name Result Flag Reference   HEMOGLOBIN A1C 5 9

## 2018-01-23 VITALS
SYSTOLIC BLOOD PRESSURE: 130 MMHG | BODY MASS INDEX: 32.9 KG/M2 | WEIGHT: 235 LBS | TEMPERATURE: 97.6 F | RESPIRATION RATE: 16 BRPM | HEART RATE: 78 BPM | HEIGHT: 71 IN | DIASTOLIC BLOOD PRESSURE: 86 MMHG

## 2018-01-23 NOTE — MISCELLANEOUS
Provider Comments  Provider Comments:   Called patient and left message for him to call and reschedule missed appointment        Signatures   Electronically signed by : SONIDO Spears ; Dec 22 2017  9:28AM EST                       (Author)

## 2018-01-23 NOTE — RESULT NOTES
Discussion/Summary   xray negative  send report to [facility] or whoever ordered PPD     Verified Results  * XR CHEST PA & LATERAL 75ZUO2203 11:29AM Lauren WILKINSON Order Number: [id number]     Test Name Result Flag Reference   XR CHEST PA & LATERAL (Report)     CHEST - DUAL ENERGY     INDICATION: R76 11: Nonspecific reaction to tuberculin skin test without active tuberculosis  History taken directly from the electronic ordering system  COMPARISON: Chest x-ray dated August 24, 2009  VIEWS: PA (including soft tissue/bone algorithms) and lateral projections     IMAGES: 4     FINDINGS:        Cardiomediastinal silhouette appears unremarkable  The lungs are clear  No pneumothorax or pleural effusion  Visualized osseous structures appear within normal limits for the patient's age  IMPRESSION:     No active pulmonary disease         Workstation performed: [id number]     Signed by:   Miguel Angel Leon MD   12/26/17       Signatures   Electronically signed by : TINA Martinez; Dec 26 2017  5:39PM EST                       (Author)

## 2018-04-18 PROCEDURE — 3072F LOW RISK FOR RETINOPATHY: CPT | Performed by: FAMILY MEDICINE

## 2018-08-15 ENCOUNTER — TELEPHONE (OUTPATIENT)
Dept: FAMILY MEDICINE CLINIC | Facility: CLINIC | Age: 58
End: 2018-08-15

## 2018-08-16 DIAGNOSIS — N52.9 ERECTILE DYSFUNCTION, UNSPECIFIED ERECTILE DYSFUNCTION TYPE: Primary | ICD-10-CM

## 2018-08-16 RX ORDER — SILDENAFIL 25 MG/1
25 TABLET, FILM COATED ORAL DAILY PRN
Qty: 10 TABLET | Refills: 0 | Status: SHIPPED | OUTPATIENT
Start: 2018-08-16 | End: 2018-08-29 | Stop reason: ALTCHOICE

## 2018-08-27 ENCOUNTER — TELEPHONE (OUTPATIENT)
Dept: FAMILY MEDICINE CLINIC | Facility: CLINIC | Age: 58
End: 2018-08-27

## 2018-08-27 NOTE — TELEPHONE ENCOUNTER
Dr Chelo Quiñones:  Patient asked if you could prescribe for him Cialis? He has a physical scheduled with you on Wednesday @ 1:30 should you want to discuss this further with him

## 2018-08-29 ENCOUNTER — OFFICE VISIT (OUTPATIENT)
Dept: FAMILY MEDICINE CLINIC | Facility: CLINIC | Age: 58
End: 2018-08-29
Payer: COMMERCIAL

## 2018-08-29 VITALS
BODY MASS INDEX: 33.4 KG/M2 | HEIGHT: 71 IN | TEMPERATURE: 97.8 F | HEART RATE: 98 BPM | RESPIRATION RATE: 16 BRPM | DIASTOLIC BLOOD PRESSURE: 98 MMHG | WEIGHT: 238.6 LBS | SYSTOLIC BLOOD PRESSURE: 138 MMHG

## 2018-08-29 DIAGNOSIS — R53.82 CHRONIC FATIGUE: ICD-10-CM

## 2018-08-29 DIAGNOSIS — N40.0 BENIGN PROSTATIC HYPERPLASIA, UNSPECIFIED WHETHER LOWER URINARY TRACT SYMPTOMS PRESENT: ICD-10-CM

## 2018-08-29 DIAGNOSIS — I10 BENIGN ESSENTIAL HYPERTENSION: ICD-10-CM

## 2018-08-29 DIAGNOSIS — R73.09 ABNORMAL GLUCOSE: ICD-10-CM

## 2018-08-29 DIAGNOSIS — R30.0 DYSURIA: ICD-10-CM

## 2018-08-29 DIAGNOSIS — E78.5 HYPERLIPIDEMIA, UNSPECIFIED HYPERLIPIDEMIA TYPE: ICD-10-CM

## 2018-08-29 DIAGNOSIS — Z00.00 PHYSICAL EXAM: Primary | ICD-10-CM

## 2018-08-29 PROBLEM — R76.11 POSITIVE PPD: Status: ACTIVE | Noted: 2017-12-26

## 2018-08-29 LAB
SL AMB  POCT GLUCOSE, UA: NORMAL
SL AMB LEUKOCYTE ESTERASE,UA: NORMAL
SL AMB POCT BILIRUBIN,UA: NORMAL
SL AMB POCT BLOOD,UA: NORMAL
SL AMB POCT CLARITY,UA: CLEAR
SL AMB POCT COLOR,UA: YELLOW
SL AMB POCT KETONES,UA: NORMAL
SL AMB POCT NITRITE,UA: NORMAL
SL AMB POCT PH,UA: 7
SL AMB POCT SPECIFIC GRAVITY,UA: 1
SL AMB POCT URINE PROTEIN: NORMAL
SL AMB POCT UROBILINOGEN: NORMAL

## 2018-08-29 PROCEDURE — 3725F SCREEN DEPRESSION PERFORMED: CPT | Performed by: FAMILY MEDICINE

## 2018-08-29 PROCEDURE — 81003 URINALYSIS AUTO W/O SCOPE: CPT | Performed by: FAMILY MEDICINE

## 2018-08-29 PROCEDURE — 99396 PREV VISIT EST AGE 40-64: CPT | Performed by: FAMILY MEDICINE

## 2018-08-29 RX ORDER — AMLODIPINE BESYLATE 5 MG/1
TABLET ORAL DAILY
COMMUNITY
End: 2018-09-26 | Stop reason: DRUGHIGH

## 2018-08-29 NOTE — PROGRESS NOTES
Assessment/Plan:     Diagnoses and all orders for this visit:    Physical exam    Benign essential hypertension  Comments:  BP elevated-inc amlodipine to 5mg bid  (or may take 10mg once daily)  DIetary modifcations-lower sodium in diet,   limit caffeine  Dysuria  Comments:  UA negative in office today  Orders:  -     POCT urine dip auto non-scope    Abnormal glucose  -     Comprehensive metabolic panel; Future  -     HEMOGLOBIN A1C W/ EAG ESTIMATION; Future  -     Comprehensive metabolic panel  -     HEMOGLOBIN A1C W/ EAG ESTIMATION    Hyperlipidemia, unspecified hyperlipidemia type  Comments:  low chol diet, inc exercise as tolerated  check thyroid fxn along w lipid profiel  Orders:  -     Comprehensive metabolic panel; Future  -     CBC and Platelet; Future  -     TSH, 3rd generation with Free T4 reflex; Future  -     Lipid panel; Future  -     Amylase; Future  -     Lipase; Future  -     Comprehensive metabolic panel  -     CBC and Platelet  -     TSH, 3rd generation with Free T4 reflex  -     Lipid panel  -     Amylase  -     Lipase    Chronic fatigue  -     CBC and Platelet; Future  -     CBC and Platelet    Benign prostatic hyperplasia, unspecified whether lower urinary tract symptoms present  -     PSA, total and free; Future  -     PSA, total and free    Other orders  -     amLODIPine (NORVASC) 5 mg tablet; Take by mouth daily            Subjective:      Patient ID: Lucita Bailey is a 66 y o  male  Chief Complaint   Patient presents with    Physical Exam    Erectile Dysfunction    Cyst     on left side of neck is getting bigger       79-year-old patient in for physical   BP noted elevated  +/- compliance with diet and exercise  Denies h/a, cp , dizziness or focal weakness  Due for labs and eye check  Interested in [address] for ED but will wait until labs back  Working at new job--still w sig stress but not as much as w last employer          The following portions of the patient's history were reviewed and updated as appropriate: allergies, current medications, past family history, past medical history, past social history, past surgical history and problem list    Social History     Social History    Marital status: /Civil Union     Spouse name: N/A    Number of children: N/A    Years of education: N/A     Social History Main Topics    Smoking status: Never Smoker    Smokeless tobacco: Never Used    Alcohol use No    Drug use: No    Sexual activity: Not Asked     Other Topics Concern    None     Social History Narrative    None       PMHX:  ED  HTN  Obesity  PSX:  None  FHX:  Father-Dementia, DM, Kidney disease  Mother-HTN      Review of Systems   Constitutional: Positive for fatigue  Negative for fever  Respiratory: Negative  Cardiovascular: Negative  Musculoskeletal: Positive for arthralgias and myalgias  Skin: Negative for rash  Neurological: Negative  Psychiatric/Behavioral: Positive for decreased concentration and sleep disturbance  Negative for hallucinations  The patient is nervous/anxious  Objective:    /98 (BP Location: Left arm, Patient Position: Sitting, Cuff Size: Large)   Pulse 98   Temp 97 8 °F (36 6 °C)   Resp 16   Ht 5' 10 5" (1 791 m)   Wt 108 kg (238 lb 9 6 oz)   BMI 33 75 kg/m²        Physical Exam   Constitutional: He is oriented to person, place, and time  overweight   HENT:   Mouth/Throat: No oropharyngeal exudate  Eyes: Conjunctivae are normal    Neck: Neck supple  Cardiovascular: Normal rate and regular rhythm  No murmur heard  Pulmonary/Chest: Effort normal and breath sounds normal  No respiratory distress  Abdominal: Soft  Bowel sounds are normal  There is no tenderness  Musculoskeletal: Normal range of motion  Neurological: He is alert and oriented to person, place, and time  A cranial nerve deficit is present  Skin: Skin is warm and dry  Psychiatric:   anxious   Nursing note and vitals reviewed  Mounika Evans MD

## 2018-09-20 LAB
ALBUMIN SERPL-MCNC: 4.7 G/DL (ref 3.5–5.5)
ALBUMIN/GLOB SERPL: 1.7 {RATIO} (ref 1.2–2.2)
ALP SERPL-CCNC: 82 IU/L (ref 39–117)
ALT SERPL-CCNC: 24 IU/L (ref 0–44)
AMYLASE SERPL-CCNC: 83 U/L (ref 31–124)
AST SERPL-CCNC: 21 IU/L (ref 0–40)
BILIRUB SERPL-MCNC: 0.8 MG/DL (ref 0–1.2)
BUN SERPL-MCNC: 11 MG/DL (ref 6–24)
BUN/CREAT SERPL: 9 (ref 9–20)
CALCIUM SERPL-MCNC: 9.7 MG/DL (ref 8.7–10.2)
CHLORIDE SERPL-SCNC: 102 MMOL/L (ref 96–106)
CHOLEST SERPL-MCNC: 219 MG/DL (ref 100–199)
CHOLEST/HDLC SERPL: 5.3 RATIO (ref 0–5)
CO2 SERPL-SCNC: 23 MMOL/L (ref 20–29)
CREAT SERPL-MCNC: 1.29 MG/DL (ref 0.76–1.27)
ERYTHROCYTE [DISTWIDTH] IN BLOOD BY AUTOMATED COUNT: 14.3 % (ref 12.3–15.4)
EST. AVERAGE GLUCOSE BLD GHB EST-MCNC: 140 MG/DL
GLOBULIN SER-MCNC: 2.8 G/DL (ref 1.5–4.5)
GLUCOSE SERPL-MCNC: 100 MG/DL (ref 65–99)
HBA1C MFR BLD: 6.5 % (ref 4.8–5.6)
HCT VFR BLD AUTO: 44.2 % (ref 37.5–51)
HDLC SERPL-MCNC: 41 MG/DL
HGB BLD-MCNC: 15.7 G/DL (ref 13–17.7)
LDLC SERPL CALC-MCNC: 149 MG/DL (ref 0–99)
LIPASE SERPL-CCNC: 49 U/L (ref 13–78)
MCH RBC QN AUTO: 29.6 PG (ref 26.6–33)
MCHC RBC AUTO-ENTMCNC: 35.5 G/DL (ref 31.5–35.7)
MCV RBC AUTO: 83 FL (ref 79–97)
MICRODELETION SYND BLD/T FISH: NORMAL
PLATELET # BLD AUTO: 212 X10E3/UL (ref 150–379)
POTASSIUM SERPL-SCNC: 4.3 MMOL/L (ref 3.5–5.2)
PROT SERPL-MCNC: 7.5 G/DL (ref 6–8.5)
PSA FREE MFR SERPL: 18.4 %
PSA FREE SERPL-MCNC: 0.57 NG/ML
PSA SERPL-MCNC: 3.1 NG/ML (ref 0–4)
RBC # BLD AUTO: 5.3 X10E6/UL (ref 4.14–5.8)
SL AMB EGFR AFRICAN AMERICAN: 71 ML/MIN/1.73
SL AMB EGFR NON AFRICAN AMERICAN: 62 ML/MIN/1.73
SL AMB VLDL CHOLESTEROL CALC: 29 MG/DL (ref 5–40)
SODIUM SERPL-SCNC: 142 MMOL/L (ref 134–144)
TRIGL SERPL-MCNC: 144 MG/DL (ref 0–149)
TSH SERPL DL<=0.005 MIU/L-ACNC: 1.54 UIU/ML (ref 0.45–4.5)
WBC # BLD AUTO: 4.8 X10E3/UL (ref 3.4–10.8)

## 2018-09-26 ENCOUNTER — OFFICE VISIT (OUTPATIENT)
Dept: FAMILY MEDICINE CLINIC | Facility: CLINIC | Age: 58
End: 2018-09-26
Payer: COMMERCIAL

## 2018-09-26 VITALS
DIASTOLIC BLOOD PRESSURE: 98 MMHG | RESPIRATION RATE: 16 BRPM | HEIGHT: 71 IN | BODY MASS INDEX: 32.93 KG/M2 | SYSTOLIC BLOOD PRESSURE: 168 MMHG | HEART RATE: 98 BPM | TEMPERATURE: 97.5 F | WEIGHT: 235.2 LBS

## 2018-09-26 DIAGNOSIS — I10 ESSENTIAL HYPERTENSION: Primary | ICD-10-CM

## 2018-09-26 DIAGNOSIS — E78.5 HYPERLIPIDEMIA, UNSPECIFIED HYPERLIPIDEMIA TYPE: ICD-10-CM

## 2018-09-26 DIAGNOSIS — E11.9 CONTROLLED TYPE 2 DIABETES MELLITUS WITHOUT COMPLICATION, WITHOUT LONG-TERM CURRENT USE OF INSULIN (HCC): ICD-10-CM

## 2018-09-26 PROCEDURE — 99214 OFFICE O/P EST MOD 30 MIN: CPT | Performed by: FAMILY MEDICINE

## 2018-09-26 RX ORDER — AMLODIPINE BESYLATE 10 MG/1
10 TABLET ORAL DAILY
Qty: 90 TABLET | Refills: 1 | Status: SHIPPED | OUTPATIENT
Start: 2018-09-26 | End: 2019-04-01 | Stop reason: SDUPTHER

## 2018-10-05 ENCOUNTER — OFFICE VISIT (OUTPATIENT)
Dept: FAMILY MEDICINE CLINIC | Facility: CLINIC | Age: 58
End: 2018-10-05
Payer: COMMERCIAL

## 2018-10-05 VITALS
DIASTOLIC BLOOD PRESSURE: 82 MMHG | TEMPERATURE: 97 F | HEART RATE: 76 BPM | WEIGHT: 233.6 LBS | HEIGHT: 71 IN | RESPIRATION RATE: 16 BRPM | BODY MASS INDEX: 32.7 KG/M2 | SYSTOLIC BLOOD PRESSURE: 120 MMHG

## 2018-10-05 DIAGNOSIS — N52.9 ERECTILE DYSFUNCTION, UNSPECIFIED ERECTILE DYSFUNCTION TYPE: ICD-10-CM

## 2018-10-05 DIAGNOSIS — I10 BENIGN ESSENTIAL HYPERTENSION: Primary | ICD-10-CM

## 2018-10-05 PROCEDURE — 99213 OFFICE O/P EST LOW 20 MIN: CPT | Performed by: FAMILY MEDICINE

## 2018-10-05 PROCEDURE — 3074F SYST BP LT 130 MM HG: CPT | Performed by: FAMILY MEDICINE

## 2018-10-05 PROCEDURE — 3079F DIAST BP 80-89 MM HG: CPT | Performed by: FAMILY MEDICINE

## 2018-10-05 PROCEDURE — 1036F TOBACCO NON-USER: CPT | Performed by: FAMILY MEDICINE

## 2018-10-05 RX ORDER — TADALAFIL 10 MG/1
10 TABLET ORAL DAILY PRN
Qty: 10 TABLET | Refills: 0 | Status: SHIPPED | OUTPATIENT
Start: 2018-10-05 | End: 2019-05-04 | Stop reason: SDUPTHER

## 2018-10-05 NOTE — PATIENT INSTRUCTIONS
Calorie Counting Diet   WHAT YOU NEED TO KNOW:   What is a calorie counting diet? It is a meal plan based on counting calories each day to reach a healthy body weight  You will need to eat fewer calories if you are trying to lose weight  Weight loss may decrease your risk for certain health problems or improve your health if you have health problems  Some of these health problems include heart disease, high blood pressure, and diabetes  What foods should I avoid? Your dietitian will tell you if you need to avoid certain foods based on your body weight and health condition  You may need to avoid high-fat foods if you are at risk for or have heart disease  You may need to eat fewer foods from the breads and starches food group if you have diabetes  How many calories are in foods? The following is a list of foods and drinks with the approximate number of calories in each  Check the food label to find the exact number of calories  A dietitian can tell you how many calories you should have from each food group each day    · Carbohydrate:      ¨ ½ of a 3-inch bagel, 1 slice of bread, or ½ of a hamburger bun or hot dog bun (80)    ¨ 1 (8-inch) flour tortilla or ½ cup of cooked rice (100)    ¨ 1 (6-inch) corn tortilla (80)    ¨ 1 (6-inch) pancake or 1 cup of bran flakes cereal (110)    ¨ ½ cup of cooked cereal (80)    ¨ ½ cup of cooked pasta (85)    ¨ 1 ounce of pretzels (100)    ¨ 3 cups of air-popped popcorn without butter or oil (80)    · Dairy:      ¨ 1 cup of skim or 1% milk (90)    ¨ 1 cup of 2% milk (120)    ¨ 1 cup of whole milk (160)    ¨ 1 cup of 2% chocolate milk (220)    ¨ 1 ounce of low-fat cheese with 3 grams of fat per ounce (70)    ¨ 1 ounce of cheddar cheese (114)    ¨ ½ cup of 1% fat cottage cheese (80)    ¨ 1 cup of plain or sugar-free, fat-free yogurt (90)    · Protein foods:      ¨ 3 ounces of fish (not breaded or fried) (95)    ¨ 3 ounces of breaded, fried fish (195)    ¨ ¾ cup of tuna canned in water (105)    ¨ 3 ounces of chicken breast without skin (105)    ¨ 1 fried chicken breast with skin (350)    ¨ ¼ cup of fat free egg substitute (40)    ¨ 1 large egg (75)    ¨ 3 ounces of lean beef or pork (165)    ¨ 3 ounces of fried pork chop or ham (185)    ¨ ½ cup of cooked dried beans, such as kidney, jorge, lentils, or navy (115)    ¨ 3 ounces of bologna or lunch meat (225)    ¨ 2 links of breakfast sausage (140)    · Vegetables:      ¨ ½ cup of sliced mushrooms (10)    ¨ 1 cup of salad greens, such as lettuce, spinach, or kalli (15)    ¨ ½ cup of steamed asparagus (20)    ¨ ½ cup of cooked summer squash, zucchini squash, or green or wax beans (25)    ¨ 1 cup of broccoli or cauliflower florets, or 1 medium tomato (25)    ¨ 1 large raw carrot or ½ cup of cooked carrots (40)    ¨ ? of a medium cucumber or 1 stalk of celery (5)    ¨ 1 small baked potato (160)    ¨ 1 cup of breaded, fried vegetables (230)    · Fruit:      ¨ 1 (6-inch) banana (55)     ¨ ½ of a 4-inch grapefruit (55)    ¨ 15 grapes (60)    ¨ 1 medium orange or apple (70)    ¨ 1 large peach (65)    ¨ 1 cup of fresh pineapple chunks (75)    ¨ 1 cup of melon cubes (50)    ¨ 1¼ cups of whole strawberries (45)    ¨ ½ cup of fruit canned in juice (55)    ¨ ½ cup of fruit canned in heavy syrup (110)    ¨ ?  cup of raisins (130)    ¨ ½ cup of unsweetened fruit juice (60)    ¨ ½ cup of grape, cranberry, or prune juice (90)    · Fat:      ¨ 10 peanuts or 2 teaspoons of peanut butter (55)    ¨ 2 tablespoons of avocado or 1 tablespoon of regular salad dressing (45)    ¨ 2 slices of culver (90)    ¨ 1 teaspoon of oil, such as safflower, canola, corn, or olive oil (45)    ¨ 2 teaspoons of low-fat margarine, or 1 tablespoon of low-fat mayonnaise (50)    ¨ 1 teaspoon of regular margarine (40)    ¨ 1 tablespoon of regular mayonnaise (135)    ¨ 1 tablespoon of cream cheese or 2 tablespoons of low-fat cream cheese (45)    ¨ 2 tablespoons of vegetable shortening (215)    · Dessert and sweets:      ¨ 8 animal crackers or 5 vanilla wafers (80)    ¨ 1 frozen fruit juice bar (80)    ¨ ½ cup of ice milk or low-fat frozen yogurt (90)    ¨ ½ cup of sherbet or sorbet (125)    ¨ ½ cup of sugar-free pudding or custard (60)    ¨ ½ cup of ice cream (140)    ¨ ½ cup of pudding or custard (175)    ¨ 1 (2-inch) square chocolate brownie (185)    · Combination foods:      ¨ Bean burrito made with an 8-inch tortilla, without cheese (275)    ¨ Chicken breast sandwich with lettuce and tomato (325)    ¨ 1 cup of chicken noodle soup (60)    ¨ 1 beef taco (175)    ¨ Regular hamburger with lettuce and tomato (310)    ¨ Regular cheeseburger with lettuce and tomato (410)     ¨ ¼ of a 12-inch cheese pizza (280)    ¨ Fried fish sandwich with lettuce and tomato (425)    ¨ Hot dog and bun (275)    ¨ 1½ cups of macaroni and cheese (310)    ¨ Taco salad with a fried tortilla shell (870)    · Low-calorie foods:      ¨ 1 tablespoon of ketchup or 1 tablespoon of fat free sour cream (15)    ¨ 1 teaspoon of mustard (5)    ¨ ¼ cup of salsa (20)    ¨ 1 large dill pickle (15)    ¨ 1 tablespoon of fat free salad dressing (10)    ¨ 2 teaspoons of low-sugar, light jam or jelly, or 1 tablespoon of sugar-free syrup (15)    ¨ 1 sugar-free popsicle (15)    ¨ 1 cup of club soda, seltzer water, or diet soda (0)  CARE AGREEMENT:   You have the right to help plan your care  Discuss treatment options with your caregivers to decide what care you want to receive  You always have the right to refuse treatment  The above information is an  only  It is not intended as medical advice for individual conditions or treatments  Talk to your doctor, nurse or pharmacist before following any medical regimen to see if it is safe and effective for you  © 2017 [address] [employer] Information is for End User's use only and may not be sold, redistributed or otherwise used for commercial purposes   All illustrations and images included in CareNotes® are the copyrighted property of A D A M [employer]  or [employer]ille [employer]ar  Chronic Hypertension   AMBULATORY CARE:   Hypertension  is high blood pressure (BP)  Your BP is the force of your blood moving against the walls of your arteries  Normal BP is less than 120/80  Prehypertension is between 120/80 and 139/89  Hypertension is 140/90 or higher  Hypertension causes your BP to get so high that your heart has to work much harder than normal  This can damage your heart  Chronic hypertension is a long-term condition that you can control with a healthy lifestyle or medicines  A controlled blood pressure helps protect your organs, such as your heart, lungs, brain, and kidneys  Common symptoms include the following:   · Headache     · Blurred vision    · Chest pain     · Dizziness or weakness     · Trouble breathing     · Nosebleeds  Call 911 for any of the following:   · You have discomfort in your chest that feels like squeezing, pressure, fullness, or pain  · You become confused or have difficulty speaking  · You suddenly feel lightheaded or have trouble breathing  · You have pain or discomfort in your back, neck, jaw, stomach, or arm  Seek care immediately if:   · You have a severe headache or vision loss  · You have weakness in an arm or leg  Contact your healthcare provider if:   · You feel faint, dizzy, confused, or drowsy  · You have been taking your BP medicine and your BP is still higher than your healthcare provider says it should be  · You have questions or concerns about your condition or care  Treatment for chronic hypertension  may include medicine to lower your BP and lower your cholesterol level  A low cholesterol level helps prevent heart disease and makes it easier to control your blood pressure  Heart disease can make your blood pressure harder to control  You may also need to make lifestyle changes  Take your medicine exactly as directed    Manage chronic hypertension:  Talk with your healthcare provider about these and other ways to manage hypertension:  · Take your BP at home  Sit and rest for 5 minutes before you take your BP  Extend your arm and support it on a flat surface  Your arm should be at the same level as your heart  Follow the directions that came with your BP monitor  If possible, take at least 2 BP readings each time  Take your BP at least twice a day at the same times each day, such as morning and evening  Keep a record of your BP readings and bring it to your follow-up visits  Ask your healthcare provider what your blood pressure should be  · Limit sodium (salt) as directed  Too much sodium can affect your fluid balance  Check labels to find low-sodium or no-salt-added foods  Some low-sodium foods use potassium salts for flavor  Too much potassium can also cause health problems  Your healthcare provider will tell you how much sodium and potassium are safe for you to have in a day  He or she may recommend that you limit sodium to 2,300 mg a day  · Follow the meal plan recommended by your healthcare provider  A dietitian or your provider can give you more information on low-sodium plans or the DASH (Dietary Approaches to Stop Hypertension) eating plan  The DASH plan is low in sodium, unhealthy fats, and total fat  It is high in potassium, calcium, and fiber  · Exercise to maintain a healthy weight  Exercise at least 30 minutes per day, on most days of the week  This will help decrease your blood pressure  Ask about the best exercise plan for you  · Decrease stress  This may help lower your BP  Learn ways to relax, such as deep breathing or listening to music  · Limit alcohol  Women should limit alcohol to 1 drink a day  Men should limit alcohol to 2 drinks a day  A drink of alcohol is 12 ounces of beer, 5 ounces of wine, or 1½ ounces of liquor  · Do not smoke    Nicotine and other chemicals in cigarettes and cigars can increase your BP and also cause lung damage  Ask your healthcare provider for information if you currently smoke and need help to quit  E-cigarettes or smokeless tobacco still contain nicotine  Talk to your healthcare provider before you use these products  Follow up with your healthcare provider as directed: You will need to return to have your BP checked and to have other lab tests done  Write down your questions so you remember to ask them during your visits  © 2017 [address] [facility] Information is for End User's use only and may not be sold, redistributed or otherwise used for commercial purposes  All illustrations and images included in CareNotes® are the copyrighted property of A D A M [employer] Inc  or Guille Villar  The above information is an  only  It is not intended as medical advice for individual conditions or treatments  Talk to your doctor, nurse or pharmacist before following any medical regimen to see if it is safe and effective for you

## 2018-10-06 NOTE — PROGRESS NOTES
Assessment/Plan:     Diagnoses and all orders for this visit:    Benign essential hypertension  Comments:  much improved w med compliance at increased dose of amlodipine and stricter adherence to dietary recommendations    Erectile dysfunction, unspecified erectile dysfunction type  Comments:  rx for cialis given  Orders:  -     tadalafil (CIALIS) 10 MG tablet; Take 1 tablet (10 mg total) by mouth daily as needed for erectile dysfunction            Subjective:      Patient ID: Darcy Lazo is a 66 y o  male  Chief Complaint   Patient presents with    Hypertension     f/u       32-year-old patient in for follow-up on hypertension  BP much improved with increased dose of amlodipine and compliance with use of med and diet  Patient feeling less anxious and sleeping better  Has been watching salt intake and avoiding caffeine  He is requesting Rx for ED med  Has been on Viagra in past was cost prohibitive is requesting an alternative  The following portions of the patient's history were reviewed and updated as appropriate: allergies, current medications, past family history, past medical history, past social history, past surgical history and problem list      Review of Systems   Constitutional: Negative  Respiratory: Negative  Cardiovascular: Negative  Gastrointestinal: Negative  Genitourinary:        ED   Musculoskeletal: Positive for arthralgias and myalgias  Skin: Positive for rash  Neurological: Negative  Psychiatric/Behavioral: Positive for decreased concentration and sleep disturbance  The patient is nervous/anxious  Objective:    /82 (BP Location: Left arm, Patient Position: Sitting, Cuff Size: Large)   Pulse 76   Temp (!) 97 °F (36 1 °C)   Resp 16   Ht 5' 10 5" (1 791 m)   Wt 106 kg (233 lb 9 6 oz)   BMI 33 04 kg/m²        Physical Exam   Constitutional: He is oriented to person, place, and time     OW, NAD   Cardiovascular: Normal rate, regular rhythm, normal heart sounds and intact distal pulses  No murmur heard  Pulmonary/Chest: Effort normal and breath sounds normal  No respiratory distress  Musculoskeletal: Normal range of motion  Neurological: He is alert and oriented to person, place, and time  No cranial nerve deficit  Psychiatric: He has a normal mood and affect  Nursing note and vitals reviewed          Yokasta Basurto MD

## 2018-10-06 NOTE — PROGRESS NOTES
Assessment/Plan:   Diagnoses and all orders for this visit:    Essential hypertension  Comments:  increase amlodipine to 10mg daily , lower salt in diet, avoid caffeine, follow-up in one wk for recheck  Orders:  -     amLODIPine (NORVASC) 10 mg tablet; Take 1 tablet (10 mg total) by mouth daily    Hyperlipidemia, unspecified hyperlipidemia type  Comments:  Low chol diet, cont  efforts at wgt reduction  BMI 33 0-33 9,adult  Comments:  addressed lifestyle modif re:diet/exercise to help w wgt reduction to help improve assoc  co-morbidities    Controlled type 2 diabetes mellitus without complication, without long-term current use of insulin (HCC)  Comments:  imx7r=6 5%  cont  t manage thru diet/exercise  check yearly eye exam             Subjective:      Patient ID: Bethany Spivey is a 66 y o  male  Chief Complaint   Patient presents with    Results     labwork       16-year-old patient in for blood pressure check and to review lab results as outlined below  BP elevated-patient reports feeling stressed secondary to family and work stress  Has occasional headaches, denies chest pain dizziness or focal weakness  Labs show hemoglobin A1c equal to 6 5% an elevation in lipids  Patient admits to noncompliance with diet and exercise  Also states there have been times when he has missed meds and some days where he has taken 1 and others 2 of the amlodipine tablets     Is due for eye and dental care as well  The following portions of the patient's history were reviewed and updated as appropriate: allergies, current medications, past family history, past medical history, past social history, past surgical history and problem list      Review of Systems   Constitutional: Positive for fatigue  Negative for fever  Eyes: Negative for visual disturbance  Respiratory: Negative  Cardiovascular: Negative  Gastrointestinal: Negative  Musculoskeletal: Positive for arthralgias and myalgias     Skin: Positive for rash  Neurological: Negative  Psychiatric/Behavioral: Positive for decreased concentration and sleep disturbance  The patient is nervous/anxious  Objective:    /98 (BP Location: Left arm, Patient Position: Sitting, Cuff Size: Large)   Pulse 98   Temp 97 5 °F (36 4 °C)   Resp 16   Ht 5' 10 5" (1 791 m)   Wt 107 kg (235 lb 3 2 oz)   BMI 33 27 kg/m²        Physical Exam   Constitutional: He is oriented to person, place, and time  Overweight, NAD   Eyes: Conjunctivae are normal    Neck: Neck supple  Cardiovascular: Normal rate, regular rhythm, normal heart sounds and intact distal pulses  No murmur heard  Pulmonary/Chest: Effort normal and breath sounds normal  No respiratory distress  Abdominal: Soft  Bowel sounds are normal  There is no tenderness  Musculoskeletal: Normal range of motion  Neurological: He is alert and oriented to person, place, and time  No cranial nerve deficit  Psychiatric:   anxious   Nursing note and vitals reviewed  Labs;  Labs in chart were reviewed      Recent Results (from the past 672 hour(s))   Comprehensive metabolic panel    Collection Time: 09/19/18 12:30 PM   Result Value Ref Range    Glucose 100 (H) 65 - 99 mg/dL    BUN 11 6 - 24 mg/dL    Creatinine 1 29 (H) 0 76 - 1 27 mg/dL    eGFR Non  62 >59 mL/min/1 73    SL AMB EGFR AFRICAN AMERICAN 71 >59 mL/min/1 73    SL AMB BUN/CREATININE RATIO 9 9 - 20    Sodium 142 134 - 144 mmol/L    SL AMB POTASSIUM 4 3 3 5 - 5 2 mmol/L    Chloride 102 96 - 106 mmol/L    CO2 23 20 - 29 mmol/L    CALCIUM 9 7 8 7 - 10 2 mg/dL    SL AMB PROTEIN, TOTAL 7 5 6 0 - 8 5 g/dL    Albumin 4 7 3 5 - 5 5 g/dL    Globulin, Total 2 8 1 5 - 4 5 g/dL    SL AMB ALBUMIN/GLOBULIN RATIO 1 7 1 2 - 2 2    TOTAL BILIRUBIN 0 8 0 0 - 1 2 mg/dL    Alk Phos Isoenzymes 82 39 - 117 IU/L    SL AMB AST 21 0 - 40 IU/L    SL AMB ALT 24 0 - 44 IU/L   CBC and Platelet    Collection Time: 09/19/18 12:30 PM   Result Value Ref Range    White Blood Cell Count 4 8 3 4 - 10 8 x10E3/uL    Red Blood Cell Count 5 30 4 14 - 5 80 x10E6/uL    Hemoglobin 15 7 13 0 - 17 7 g/dL    HCT 44 2 37 5 - 51 0 %    MCV 83 79 - 97 fL    MCH 29 6 26 6 - 33 0 pg    MCHC 35 5 31 5 - 35 7 g/dL    RDW 14 3 12 3 - 15 4 %    Platelet Count 151 705 - 379 x10E3/uL   TSH, 3rd generation with Free T4 reflex    Collection Time: 09/19/18 12:30 PM   Result Value Ref Range    TSH 1 540 0 450 - 4 500 uIU/mL   Lipid panel    Collection Time: 09/19/18 12:30 PM   Result Value Ref Range    Cholesterol, Total 219 (H) 100 - 199 mg/dL    Triglycerides 144 0 - 149 mg/dL    HDL 41 >39 mg/dL    SL AMB VLDL CHOLESTEROL CALC 29 5 - 40 mg/dL    LDL Direct 149 (H) 0 - 99 mg/dL    T   Chol/HDL Ratio 5 3 (H) 0 0 - 5 0 ratio   HEMOGLOBIN A1C W/ EAG ESTIMATION    Collection Time: 09/19/18 12:30 PM   Result Value Ref Range    Hemoglobin A1C 6 5 (H) 4 8 - 5 6 %    Estimated Average Glucose 140 mg/dL   PSA, total and free    Collection Time: 09/19/18 12:30 PM   Result Value Ref Range    Prostate Specific Antigen Total 3 1 0 0 - 4 0 ng/mL    PSA, Free 0 57 N/A ng/mL    PSA, Free Pct 18 4 %   Amylase    Collection Time: 09/19/18 12:30 PM   Result Value Ref Range    SL AMB AMYLASE, SERUM 83 31 - 124 U/L   Lipase    Collection Time: 09/19/18 12:30 PM   Result Value Ref Range    Lipase, Serum 49 13 - 78 U/L   Cardiovascular Report    Collection Time: 09/19/18 12:30 PM   Result Value Ref Range    SL AMB INTERPRETATION Note      Frank Dominguez MD

## 2018-12-03 DIAGNOSIS — D22.9 ATYPICAL NEVI: Primary | ICD-10-CM

## 2018-12-06 ENCOUNTER — TELEPHONE (OUTPATIENT)
Dept: FAMILY MEDICINE CLINIC | Facility: CLINIC | Age: 58
End: 2018-12-06

## 2018-12-06 DIAGNOSIS — D22.9 ATYPICAL NEVI: Primary | ICD-10-CM

## 2018-12-06 DIAGNOSIS — L98.9 LESION OF NECK: ICD-10-CM

## 2018-12-06 NOTE — TELEPHONE ENCOUNTER
Dr Meghan Madrigal:    Patient needs a new order for dermotologist   She is going to [facility], [address], [place]  The doctor you originally gave her is not in her insurance plan  Please mail new order

## 2019-03-08 ENCOUNTER — OFFICE VISIT (OUTPATIENT)
Dept: FAMILY MEDICINE CLINIC | Facility: CLINIC | Age: 59
End: 2019-03-08
Payer: COMMERCIAL

## 2019-03-08 VITALS
WEIGHT: 235.2 LBS | HEIGHT: 71 IN | HEART RATE: 88 BPM | RESPIRATION RATE: 16 BRPM | DIASTOLIC BLOOD PRESSURE: 98 MMHG | TEMPERATURE: 97.7 F | BODY MASS INDEX: 32.93 KG/M2 | SYSTOLIC BLOOD PRESSURE: 150 MMHG

## 2019-03-08 DIAGNOSIS — I10 BENIGN ESSENTIAL HYPERTENSION: ICD-10-CM

## 2019-03-08 DIAGNOSIS — M79.672 LEFT FOOT PAIN: ICD-10-CM

## 2019-03-08 DIAGNOSIS — E66.9 OBESITY WITH BODY MASS INDEX 30 OR GREATER: ICD-10-CM

## 2019-03-08 DIAGNOSIS — E11.65 CONTROLLED TYPE 2 DIABETES MELLITUS WITH HYPERGLYCEMIA, WITHOUT LONG-TERM CURRENT USE OF INSULIN (HCC): Primary | ICD-10-CM

## 2019-03-08 LAB
SL AMB  POCT GLUCOSE, UA: NORMAL
SL AMB LEUKOCYTE ESTERASE,UA: NORMAL
SL AMB POCT BILIRUBIN,UA: NORMAL
SL AMB POCT BLOOD,UA: NORMAL
SL AMB POCT CLARITY,UA: NORMAL
SL AMB POCT COLOR,UA: YELLOW
SL AMB POCT HEMOGLOBIN AIC: 5.6 (ref ?–6.5)
SL AMB POCT KETONES,UA: NORMAL
SL AMB POCT NITRITE,UA: NORMAL
SL AMB POCT PH,UA: 5
SL AMB POCT SPECIFIC GRAVITY,UA: 1.02
SL AMB POCT URINE PROTEIN: NORMAL
SL AMB POCT UROBILINOGEN: NORMAL

## 2019-03-08 PROCEDURE — 81003 URINALYSIS AUTO W/O SCOPE: CPT | Performed by: FAMILY MEDICINE

## 2019-03-08 PROCEDURE — 83036 HEMOGLOBIN GLYCOSYLATED A1C: CPT | Performed by: FAMILY MEDICINE

## 2019-03-08 PROCEDURE — 3044F HG A1C LEVEL LT 7.0%: CPT | Performed by: FAMILY MEDICINE

## 2019-03-08 PROCEDURE — 99214 OFFICE O/P EST MOD 30 MIN: CPT | Performed by: FAMILY MEDICINE

## 2019-03-10 LAB
ALBUMIN/CREAT UR: 3.1 MG/G CREAT (ref 0–30)
CREAT UR-MCNC: 176.5 MG/DL
MICROALBUMIN UR-MCNC: 5.4 UG/ML

## 2019-04-01 DIAGNOSIS — I10 ESSENTIAL HYPERTENSION: ICD-10-CM

## 2019-04-01 RX ORDER — AMLODIPINE BESYLATE 10 MG/1
10 TABLET ORAL DAILY
Qty: 90 TABLET | Refills: 1 | Status: SHIPPED | OUTPATIENT
Start: 2019-04-01 | End: 2019-09-29 | Stop reason: SDUPTHER

## 2019-04-03 ENCOUNTER — TELEPHONE (OUTPATIENT)
Dept: FAMILY MEDICINE CLINIC | Facility: CLINIC | Age: 59
End: 2019-04-03

## 2019-05-04 DIAGNOSIS — N52.9 ERECTILE DYSFUNCTION, UNSPECIFIED ERECTILE DYSFUNCTION TYPE: ICD-10-CM

## 2019-05-04 RX ORDER — TADALAFIL 10 MG/1
10 TABLET ORAL DAILY PRN
Qty: 10 TABLET | Refills: 0 | Status: SHIPPED | OUTPATIENT
Start: 2019-05-04 | End: 2021-06-10 | Stop reason: SDUPTHER

## 2019-05-14 ENCOUNTER — TRANSCRIBE ORDERS (OUTPATIENT)
Dept: ADMINISTRATIVE | Facility: HOSPITAL | Age: 59
End: 2019-05-14

## 2019-05-14 ENCOUNTER — TELEPHONE (OUTPATIENT)
Dept: FAMILY MEDICINE CLINIC | Facility: CLINIC | Age: 59
End: 2019-05-14

## 2019-05-14 ENCOUNTER — HOSPITAL ENCOUNTER (OUTPATIENT)
Dept: RADIOLOGY | Facility: HOSPITAL | Age: 59
Discharge: HOME | End: 2019-05-14
Attending: FAMILY MEDICINE
Payer: COMMERCIAL

## 2019-05-14 DIAGNOSIS — M79.672 LEFT FOOT PAIN: ICD-10-CM

## 2019-05-14 PROCEDURE — 73630 X-RAY EXAM OF FOOT: CPT

## 2019-05-15 ENCOUNTER — TELEPHONE (OUTPATIENT)
Dept: FAMILY MEDICINE CLINIC | Facility: CLINIC | Age: 59
End: 2019-05-15

## 2019-09-29 DIAGNOSIS — I10 ESSENTIAL HYPERTENSION: ICD-10-CM

## 2019-09-29 RX ORDER — AMLODIPINE BESYLATE 10 MG/1
TABLET ORAL
Qty: 90 TABLET | Refills: 1 | Status: SHIPPED | OUTPATIENT
Start: 2019-09-29 | End: 2021-05-18 | Stop reason: DRUGHIGH

## 2019-10-31 ENCOUNTER — HOSPITAL ENCOUNTER (OUTPATIENT)
Dept: RADIOLOGY | Facility: HOSPITAL | Age: 59
Discharge: HOME | End: 2019-10-31
Attending: PODIATRIST
Payer: COMMERCIAL

## 2019-10-31 ENCOUNTER — OFFICE VISIT (OUTPATIENT)
Dept: PODIATRY | Facility: CLINIC | Age: 59
End: 2019-10-31
Payer: COMMERCIAL

## 2019-10-31 ENCOUNTER — TELEPHONE (OUTPATIENT)
Dept: PODIATRY | Facility: CLINIC | Age: 59
End: 2019-10-31

## 2019-10-31 ENCOUNTER — TRANSCRIBE ORDERS (OUTPATIENT)
Dept: ADMINISTRATIVE | Facility: HOSPITAL | Age: 59
End: 2019-10-31

## 2019-10-31 VITALS — BODY MASS INDEX: 32.9 KG/M2 | HEIGHT: 71 IN | RESPIRATION RATE: 17 BRPM | WEIGHT: 235 LBS

## 2019-10-31 DIAGNOSIS — L02.612 ABSCESS OF TOE OF LEFT FOOT: Primary | ICD-10-CM

## 2019-10-31 DIAGNOSIS — M79.672 LEFT FOOT PAIN: ICD-10-CM

## 2019-10-31 DIAGNOSIS — L02.612 ABSCESS OF TOE OF LEFT FOOT: ICD-10-CM

## 2019-10-31 PROCEDURE — 73630 X-RAY EXAM OF FOOT: CPT

## 2019-10-31 PROCEDURE — 99243 OFF/OP CNSLTJ NEW/EST LOW 30: CPT | Performed by: PODIATRIST

## 2019-10-31 RX ORDER — CLINDAMYCIN HYDROCHLORIDE 300 MG/1
300 CAPSULE ORAL 4 TIMES DAILY
Qty: 40 CAPSULE | Refills: 0 | Status: SHIPPED | OUTPATIENT
Start: 2019-10-31 | End: 2019-11-10

## 2019-10-31 NOTE — PROGRESS NOTES
Assessment/Plan:  Abscess left hallux  Status post trauma  Paronychia  Rule out fracture  Rule out osteomyelitis    Plan  Incision and drainage done  Culture and sensitivity done  Wound care started  Patient be placed on clindamycin and Bactroban  X-rays ordered to rule out fracture osteomyelitis  No problem-specific Assessment & Plan notes found for this encounter  Diagnoses and all orders for this visit:    Abscess of toe of left foot  -     clindamycin (CLEOCIN) 300 MG capsule; Take 1 capsule (300 mg total) by mouth 4 (four) times a day for 10 days  -     mupirocin (BACTROBAN) 2 % ointment; Apply topically 3 (three) times a day  -     X-ray foot left 3+ views; Future    Left foot pain  -     clindamycin (CLEOCIN) 300 MG capsule; Take 1 capsule (300 mg total) by mouth 4 (four) times a day for 10 days  -     mupirocin (BACTROBAN) 2 % ointment; Apply topically 3 (three) times a day  -     X-ray foot left 3+ views; Future          Subjective:  Patient is seen on referral   He has history of injury to left big toe  Approximately 1 week prior he injured toe  He now has increasing pain  No history of fever or night sweats    Past Medical History:   Diagnosis Date    Erectile dysfunction     Hypertension     Obesity, Class II, BMI 35 0-39 9, with comorbidity (see actual BMI)     last assessed 12/5/14       No past surgical history on file      Allergies   Allergen Reactions    Azithromycin Edema         Current Outpatient Medications:     amLODIPine (NORVASC) 10 mg tablet, TAKE ONE TABLET BY MOUTH EVERY DAY (Patient not taking: Reported on 10/31/2019), Disp: 90 tablet, Rfl: 1    clindamycin (CLEOCIN) 300 MG capsule, Take 1 capsule (300 mg total) by mouth 4 (four) times a day for 10 days, Disp: 40 capsule, Rfl: 0    mupirocin (BACTROBAN) 2 % ointment, Apply topically 3 (three) times a day, Disp: 22 g, Rfl: 0    tadalafil (CIALIS) 10 MG tablet, Take 1 tablet (10 mg total) by mouth daily as needed for erectile dysfunction, Disp: 10 tablet, Rfl: 0    Patient Active Problem List   Diagnosis    Abnormal glucose    Benign essential hypertension    Anxiety    Obesity with body mass index 30 or greater    Diabetes mellitus type II, controlled ([address] )    Erectile dysfunction    Hyperlipidemia    Positive PPD          Patient ID: Abbey Weiner is a 64 y o  male  HPI    The following portions of the patient's history were reviewed and updated as appropriate:     family history includes Dementia in his father; Diabetes in his father; Hypertension in his mother; Kidney disease in his father  reports that he has never smoked  He has never used smokeless tobacco  He reports that he does not drink alcohol or use drugs  Vitals:    10/31/19 1017   Resp: 17       Review of Systems      Objective:  Patient's shoes and socks removed  Foot Exam    General  General Appearance: appears stated age and healthy   Orientation: alert and oriented to person, place, and time   Affect: appropriate   Gait: antalgic       Right Foot/Ankle     Inspection and Palpation  Arch: pes planus    Neurovascular  Dorsalis pedis: 3+  Posterior tibial: 3+  Saphenous nerve sensation: normal  Tibial nerve sensation: normal  Superficial peroneal nerve sensation: normal  Deep peroneal nerve sensation: normal  Sural nerve sensation: normal      Left Foot/Ankle      Inspection and Palpation  Ecchymosis: first toe  Skin Exam: skin changes; Neurovascular  Dorsalis pedis: 3+  Posterior tibial: 3+  Saphenous nerve sensation: normal  Tibial nerve sensation: normal  Superficial peroneal nerve sensation: normal  Deep peroneal nerve sensation: normal  Sural nerve sensation: normal        Physical Exam   Constitutional: He is oriented to person, place, and time  He appears well-developed and well-nourished  Cardiovascular: Normal rate and regular rhythm     Pulses:       Dorsalis pedis pulses are 3+ on the right side, and 3+ on the left side         Posterior tibial pulses are 3+ on the right side, and 3+ on the left side  Neurological: He is alert and oriented to person, place, and time  Skin: Skin is warm  Capillary refill takes less than 2 seconds  Proximal nail fold left hallux demonstrates large abscess  Incision and drainage done  Creamy exudate noted  Negative sinus or ascending cellulitis   Psychiatric: He has a normal mood and affect  His behavior is normal  Judgment and thought content normal    Vitals reviewed          Procedures

## 2019-11-04 LAB
BACTERIA SPEC AEROBE CULT: ABNORMAL
Lab: ABNORMAL
Lab: ABNORMAL
SL AMB ANTIMICROBIAL SUSCEPTIBILITY: ABNORMAL

## 2019-11-13 DIAGNOSIS — L02.612 ABSCESS OF TOE OF LEFT FOOT: ICD-10-CM

## 2019-11-13 DIAGNOSIS — M79.672 LEFT FOOT PAIN: ICD-10-CM

## 2019-11-26 ENCOUNTER — OFFICE VISIT (OUTPATIENT)
Dept: PODIATRY | Facility: CLINIC | Age: 59
End: 2019-11-26
Payer: COMMERCIAL

## 2019-11-26 VITALS — HEIGHT: 71 IN | WEIGHT: 235 LBS | RESPIRATION RATE: 17 BRPM | BODY MASS INDEX: 32.9 KG/M2

## 2019-11-26 DIAGNOSIS — M79.672 LEFT FOOT PAIN: Primary | ICD-10-CM

## 2019-11-26 DIAGNOSIS — M21.962 ACQUIRED DEFORMITY OF LEFT FOOT: ICD-10-CM

## 2019-11-26 DIAGNOSIS — L03.032 PARONYCHIA OF TOENAIL OF LEFT FOOT: ICD-10-CM

## 2019-11-26 PROCEDURE — 99213 OFFICE O/P EST LOW 20 MIN: CPT | Performed by: PODIATRIST

## 2019-11-26 NOTE — PROGRESS NOTES
Assessment/Plan:  Resolved abscess left hallux  Resolved paronychia left foot  Xerosis of skin  Hallux valgus deformity left foot  Plan  Nail debrided  Gentian violet applied  Patient will watch for signs of infection  No problem-specific Assessment & Plan notes found for this encounter  Diagnoses and all orders for this visit:    Left foot pain    Acquired deformity of left foot    Paronychia of toenail of left foot          Subjective:  Patient is status post incision and drainage of abscess of left foot  He took antibiotics  He has no pain at this time  Past Medical History:   Diagnosis Date    Erectile dysfunction     Hypertension     Obesity, Class II, BMI 35 0-39 9, with comorbidity (see actual BMI)     last assessed 12/5/14       No past surgical history on file  Allergies   Allergen Reactions    Azithromycin Edema         Current Outpatient Medications:     amLODIPine (NORVASC) 10 mg tablet, TAKE ONE TABLET BY MOUTH EVERY DAY (Patient not taking: Reported on 10/31/2019), Disp: 90 tablet, Rfl: 1    mupirocin (BACTROBAN) 2 % ointment, APPLY TOPICALLY 3 (THREE) TIMES A DAY, Disp: 22 g, Rfl: 0    tadalafil (CIALIS) 10 MG tablet, Take 1 tablet (10 mg total) by mouth daily as needed for erectile dysfunction, Disp: 10 tablet, Rfl: 0    Patient Active Problem List   Diagnosis    Abnormal glucose    Benign essential hypertension    Anxiety    Obesity with body mass index 30 or greater    Diabetes mellitus type II, controlled [address] [address]    Erectile dysfunction    Hyperlipidemia    Positive PPD          Patient ID: Elian Bradley is a 64 y o  male  HPI    The following portions of the patient's history were reviewed and updated as appropriate:     family history includes Dementia in his father; Diabetes in his father; Hypertension in his mother; Kidney disease in his father  reports that he has never smoked   He has never used smokeless tobacco  He reports that he does not drink alcohol or use drugs  Vitals:    11/26/19 1417   Resp: 17       Review of Systems      Objective:  Patient's shoes and socks removed  Foot ExamPhysical Exam       Foot Exam     General  General Appearance: appears stated age and healthy   Orientation: alert and oriented to person, place, and time   Affect: appropriate   Gait: antalgic         Right Foot/Ankle      Inspection and Palpation  Arch: pes planus     Neurovascular  Dorsalis pedis: 3+  Posterior tibial: 3+  Saphenous nerve sensation: normal  Tibial nerve sensation: normal  Superficial peroneal nerve sensation: normal  Deep peroneal nerve sensation: normal  Sural nerve sensation: normal        Left Foot/Ankle       Inspection and Palpation  Ecchymosis: first toe  Skin Exam: skin changes;      Neurovascular  Dorsalis pedis: 3+  Posterior tibial: 3+  Saphenous nerve sensation: normal  Tibial nerve sensation: normal  Superficial peroneal nerve sensation: normal  Deep peroneal nerve sensation: normal  Sural nerve sensation: normal           Physical Exam   Constitutional: He is oriented to person, place, and time  He appears well-developed and well-nourished  Cardiovascular: Normal rate and regular rhythm  Pulses:       Dorsalis pedis pulses are 3+ on the right side, and 3+ on the left side  Posterior tibial pulses are 3+ on the right side, and 3+ on the left side  Neurological: He is alert and oriented to person, place, and time  Skin: Skin is warm  Capillary refill takes less than 2 seconds  left hallux demonstrates resolved abscess  Resolved paronychia    Xerosis of skin noted    Procedures

## 2020-01-21 DIAGNOSIS — L02.612 ABSCESS OF TOE OF LEFT FOOT: ICD-10-CM

## 2020-01-21 DIAGNOSIS — M79.672 LEFT FOOT PAIN: ICD-10-CM

## 2020-01-29 DIAGNOSIS — L02.612 ABSCESS OF TOE OF LEFT FOOT: ICD-10-CM

## 2020-01-29 DIAGNOSIS — M79.672 LEFT FOOT PAIN: ICD-10-CM

## 2020-11-11 ENCOUNTER — OFFICE VISIT (OUTPATIENT)
Dept: FAMILY MEDICINE CLINIC | Facility: CLINIC | Age: 60
End: 2020-11-11
Payer: COMMERCIAL

## 2020-11-11 VITALS
RESPIRATION RATE: 18 BRPM | WEIGHT: 236.2 LBS | OXYGEN SATURATION: 97 % | DIASTOLIC BLOOD PRESSURE: 88 MMHG | HEIGHT: 70 IN | SYSTOLIC BLOOD PRESSURE: 130 MMHG | BODY MASS INDEX: 33.82 KG/M2 | HEART RATE: 98 BPM | TEMPERATURE: 97.4 F

## 2020-11-11 DIAGNOSIS — R07.89 ATYPICAL CHEST PAIN: ICD-10-CM

## 2020-11-11 DIAGNOSIS — I10 ESSENTIAL HYPERTENSION: ICD-10-CM

## 2020-11-11 DIAGNOSIS — E11.9 CONTROLLED TYPE 2 DIABETES MELLITUS WITHOUT COMPLICATION, WITHOUT LONG-TERM CURRENT USE OF INSULIN (HCC): ICD-10-CM

## 2020-11-11 DIAGNOSIS — E78.5 HYPERLIPIDEMIA, UNSPECIFIED HYPERLIPIDEMIA TYPE: ICD-10-CM

## 2020-11-11 DIAGNOSIS — Z12.5 PROSTATE CANCER SCREENING: ICD-10-CM

## 2020-11-11 DIAGNOSIS — Z82.49 FAMILY HISTORY OF HEART DISEASE: ICD-10-CM

## 2020-11-11 DIAGNOSIS — Z00.00 PHYSICAL EXAM: Primary | ICD-10-CM

## 2020-11-11 LAB
SL AMB  POCT GLUCOSE, UA: NORMAL
SL AMB LEUKOCYTE ESTERASE,UA: NORMAL
SL AMB POCT BILIRUBIN,UA: NORMAL
SL AMB POCT BLOOD,UA: NORMAL
SL AMB POCT CLARITY,UA: CLEAR
SL AMB POCT COLOR,UA: YELLOW
SL AMB POCT HEMOGLOBIN AIC: 6.9 (ref ?–6.5)
SL AMB POCT KETONES,UA: NORMAL
SL AMB POCT NITRITE,UA: NORMAL
SL AMB POCT PH,UA: 7
SL AMB POCT SPECIFIC GRAVITY,UA: 1.01
SL AMB POCT URINE PROTEIN: NORMAL
SL AMB POCT UROBILINOGEN: NORMAL

## 2020-11-11 PROCEDURE — 3044F HG A1C LEVEL LT 7.0%: CPT | Performed by: INTERNAL MEDICINE

## 2020-11-11 PROCEDURE — 99396 PREV VISIT EST AGE 40-64: CPT | Performed by: FAMILY MEDICINE

## 2020-11-11 PROCEDURE — 3725F SCREEN DEPRESSION PERFORMED: CPT | Performed by: FAMILY MEDICINE

## 2020-11-11 PROCEDURE — 81003 URINALYSIS AUTO W/O SCOPE: CPT | Performed by: FAMILY MEDICINE

## 2020-11-11 PROCEDURE — 83036 HEMOGLOBIN GLYCOSYLATED A1C: CPT | Performed by: FAMILY MEDICINE

## 2020-11-12 LAB
ALBUMIN/CREAT UR: 4 MG/G CREAT (ref 0–29)
CREAT UR-MCNC: 332.6 MG/DL
MICROALBUMIN UR-MCNC: 13.3 UG/ML

## 2020-11-12 PROCEDURE — 3061F NEG MICROALBUMINURIA REV: CPT | Performed by: INTERNAL MEDICINE

## 2020-11-19 ENCOUNTER — HOSPITAL ENCOUNTER (OUTPATIENT)
Dept: RADIOLOGY | Facility: HOSPITAL | Age: 60
Discharge: HOME | End: 2020-11-19
Attending: FAMILY MEDICINE
Payer: COMMERCIAL

## 2020-11-19 ENCOUNTER — CONSULT (OUTPATIENT)
Dept: CARDIOLOGY CLINIC | Facility: CLINIC | Age: 60
End: 2020-11-19
Payer: COMMERCIAL

## 2020-11-19 ENCOUNTER — TELEPHONE (OUTPATIENT)
Dept: FAMILY MEDICINE CLINIC | Facility: CLINIC | Age: 60
End: 2020-11-19

## 2020-11-19 VITALS
WEIGHT: 233 LBS | BODY MASS INDEX: 33.36 KG/M2 | TEMPERATURE: 99 F | SYSTOLIC BLOOD PRESSURE: 140 MMHG | HEIGHT: 70 IN | HEART RATE: 98 BPM | OXYGEN SATURATION: 98 % | DIASTOLIC BLOOD PRESSURE: 80 MMHG

## 2020-11-19 DIAGNOSIS — E78.5 HYPERLIPIDEMIA, UNSPECIFIED HYPERLIPIDEMIA TYPE: ICD-10-CM

## 2020-11-19 DIAGNOSIS — Z82.49 FAMILY HISTORY OF HEART DISEASE: ICD-10-CM

## 2020-11-19 DIAGNOSIS — R07.89 ATYPICAL CHEST PAIN: ICD-10-CM

## 2020-11-19 DIAGNOSIS — I10 ESSENTIAL HYPERTENSION: ICD-10-CM

## 2020-11-19 DIAGNOSIS — E11.9 CONTROLLED TYPE 2 DIABETES MELLITUS WITHOUT COMPLICATION, WITHOUT LONG-TERM CURRENT USE OF INSULIN (HCC): ICD-10-CM

## 2020-11-19 PROCEDURE — 3008F BODY MASS INDEX DOCD: CPT | Performed by: INTERNAL MEDICINE

## 2020-11-19 PROCEDURE — 1036F TOBACCO NON-USER: CPT | Performed by: INTERNAL MEDICINE

## 2020-11-19 PROCEDURE — 99243 OFF/OP CNSLTJ NEW/EST LOW 30: CPT | Performed by: INTERNAL MEDICINE

## 2020-11-19 PROCEDURE — 71046 X-RAY EXAM CHEST 2 VIEWS: CPT

## 2020-11-19 PROCEDURE — 3077F SYST BP >= 140 MM HG: CPT | Performed by: INTERNAL MEDICINE

## 2020-11-19 PROCEDURE — 3079F DIAST BP 80-89 MM HG: CPT | Performed by: INTERNAL MEDICINE

## 2020-11-19 PROCEDURE — 4010F ACE/ARB THERAPY RXD/TAKEN: CPT | Performed by: INTERNAL MEDICINE

## 2020-11-19 RX ORDER — LISINOPRIL 2.5 MG/1
2.5 TABLET ORAL DAILY
Qty: 60 TABLET | Refills: 3 | Status: SHIPPED | OUTPATIENT
Start: 2020-11-19 | End: 2021-03-10 | Stop reason: ALTCHOICE

## 2020-11-20 LAB
ALBUMIN SERPL-MCNC: 4.4 G/DL (ref 3.8–4.9)
ALBUMIN/GLOB SERPL: 1.6 {RATIO} (ref 1.2–2.2)
ALP SERPL-CCNC: 72 IU/L (ref 39–117)
ALT SERPL-CCNC: 33 IU/L (ref 0–44)
AMYLASE SERPL-CCNC: 119 U/L (ref 31–110)
AST SERPL-CCNC: 25 IU/L (ref 0–40)
BASOPHILS # BLD AUTO: 0 X10E3/UL (ref 0–0.2)
BASOPHILS NFR BLD AUTO: 1 %
BILIRUB SERPL-MCNC: 0.8 MG/DL (ref 0–1.2)
BUN SERPL-MCNC: 13 MG/DL (ref 6–24)
BUN/CREAT SERPL: 11 (ref 9–20)
CALCIUM SERPL-MCNC: 9.8 MG/DL (ref 8.7–10.2)
CHLORIDE SERPL-SCNC: 102 MMOL/L (ref 96–106)
CHOLEST SERPL-MCNC: 218 MG/DL (ref 100–199)
CO2 SERPL-SCNC: 23 MMOL/L (ref 20–29)
CREAT SERPL-MCNC: 1.17 MG/DL (ref 0.76–1.27)
EOSINOPHIL # BLD AUTO: 0 X10E3/UL (ref 0–0.4)
EOSINOPHIL NFR BLD AUTO: 0 %
ERYTHROCYTE [DISTWIDTH] IN BLOOD BY AUTOMATED COUNT: 13.1 % (ref 11.6–15.4)
GLOBULIN SER-MCNC: 2.8 G/DL (ref 1.5–4.5)
GLUCOSE SERPL-MCNC: 116 MG/DL (ref 65–99)
HCT VFR BLD AUTO: 44.2 % (ref 37.5–51)
HDLC SERPL-MCNC: 40 MG/DL
HGB BLD-MCNC: 15.2 G/DL (ref 13–17.7)
IMM GRANULOCYTES # BLD: 0 X10E3/UL (ref 0–0.1)
IMM GRANULOCYTES NFR BLD: 0 %
LDLC SERPL CALC-MCNC: 157 MG/DL (ref 0–99)
LIPASE SERPL-CCNC: 94 U/L (ref 13–78)
LYMPHOCYTES # BLD AUTO: 2.2 X10E3/UL (ref 0.7–3.1)
LYMPHOCYTES NFR BLD AUTO: 47 %
MAGNESIUM SERPL-MCNC: 2.1 MG/DL (ref 1.6–2.3)
MCH RBC QN AUTO: 29.5 PG (ref 26.6–33)
MCHC RBC AUTO-ENTMCNC: 34.4 G/DL (ref 31.5–35.7)
MCV RBC AUTO: 86 FL (ref 79–97)
MICRODELETION SYND BLD/T FISH: NORMAL
MONOCYTES # BLD AUTO: 0.4 X10E3/UL (ref 0.1–0.9)
MONOCYTES NFR BLD AUTO: 7 %
NEUTROPHILS # BLD AUTO: 2.1 X10E3/UL (ref 1.4–7)
NEUTROPHILS NFR BLD AUTO: 45 %
PLATELET # BLD AUTO: 260 X10E3/UL (ref 150–450)
POTASSIUM SERPL-SCNC: 4.3 MMOL/L (ref 3.5–5.2)
PROT SERPL-MCNC: 7.2 G/DL (ref 6–8.5)
PSA SERPL-MCNC: 2.7 NG/ML (ref 0–4)
RBC # BLD AUTO: 5.16 X10E6/UL (ref 4.14–5.8)
SL AMB EGFR AFRICAN AMERICAN: 79 ML/MIN/1.73
SL AMB EGFR NON AFRICAN AMERICAN: 68 ML/MIN/1.73
SODIUM SERPL-SCNC: 139 MMOL/L (ref 134–144)
TRIGL SERPL-MCNC: 116 MG/DL (ref 0–149)
TSH SERPL DL<=0.005 MIU/L-ACNC: 1.98 UIU/ML (ref 0.45–4.5)
WBC # BLD AUTO: 4.8 X10E3/UL (ref 3.4–10.8)

## 2020-11-25 ENCOUNTER — TELEPHONE (OUTPATIENT)
Dept: FAMILY MEDICINE CLINIC | Facility: CLINIC | Age: 60
End: 2020-11-25

## 2020-12-02 ENCOUNTER — TELEMEDICINE (OUTPATIENT)
Dept: FAMILY MEDICINE CLINIC | Facility: CLINIC | Age: 60
End: 2020-12-02
Payer: COMMERCIAL

## 2020-12-02 DIAGNOSIS — R10.10 PAIN OF UPPER ABDOMEN: ICD-10-CM

## 2020-12-02 DIAGNOSIS — I10 ESSENTIAL HYPERTENSION: Primary | ICD-10-CM

## 2020-12-02 DIAGNOSIS — E78.5 HYPERLIPIDEMIA, UNSPECIFIED HYPERLIPIDEMIA TYPE: ICD-10-CM

## 2020-12-02 DIAGNOSIS — E11.9 CONTROLLED TYPE 2 DIABETES MELLITUS WITHOUT COMPLICATION, WITHOUT LONG-TERM CURRENT USE OF INSULIN (HCC): ICD-10-CM

## 2020-12-02 PROCEDURE — 99213 OFFICE O/P EST LOW 20 MIN: CPT | Performed by: FAMILY MEDICINE

## 2020-12-03 PROBLEM — R10.10 PAIN OF UPPER ABDOMEN: Status: ACTIVE | Noted: 2020-12-03

## 2021-03-01 ENCOUNTER — TELEMEDICINE (OUTPATIENT)
Dept: FAMILY MEDICINE CLINIC | Facility: CLINIC | Age: 61
End: 2021-03-01
Payer: COMMERCIAL

## 2021-03-01 VITALS — DIASTOLIC BLOOD PRESSURE: 80 MMHG | SYSTOLIC BLOOD PRESSURE: 126 MMHG

## 2021-03-01 DIAGNOSIS — E78.5 HYPERLIPIDEMIA, UNSPECIFIED HYPERLIPIDEMIA TYPE: ICD-10-CM

## 2021-03-01 DIAGNOSIS — I10 ESSENTIAL HYPERTENSION: Primary | ICD-10-CM

## 2021-03-01 DIAGNOSIS — E11.9 CONTROLLED TYPE 2 DIABETES MELLITUS WITHOUT COMPLICATION, WITHOUT LONG-TERM CURRENT USE OF INSULIN (HCC): ICD-10-CM

## 2021-03-01 DIAGNOSIS — R74.8 ELEVATED AMYLASE AND LIPASE: ICD-10-CM

## 2021-03-01 PROCEDURE — 99213 OFFICE O/P EST LOW 20 MIN: CPT | Performed by: FAMILY MEDICINE

## 2021-03-03 NOTE — PROGRESS NOTES
Virtual Brief Visit    Assessment/Plan:    Problem List Items Addressed This Visit     Essential hypertension - Primary     BP at goal--continue same management     Schedule eye check  Diabetes mellitus type II, controlled ([address] )    Relevant Orders    Comprehensive metabolic panel    Hemoglobin A1C    Hyperlipidemia    Relevant Orders    Lipid Panel with Direct LDL reflex      Other Visit Diagnoses     Elevated amylase and lipase        Relevant Orders    Lipase    Amylase        Follow-up post lap completion  Reason for visit is   Chief Complaint   Patient presents with    Virtual Brief Visit    Follow-up    Hypertension    Diabetes    Hyperlipidemia        Encounter provider Maldonado Hinton MD    Provider located at [address] [place] [id number]    Recent Visits  Date Type Provider Dept   03/01/21 [address],[facility],  [facility] recent visits within past 7 days and meeting all other requirements     Future Appointments  No visits were found meeting these conditions  Showing future appointments within next 150 days and meeting all other requirements        After connecting through telephone, the patient was identified by name and date of birth  Yessenia Bustamante was informed that this is a telemedicine visit and that the visit is being conducted through telephone  My office door was closed  No one else was in the room  He acknowledged consent and understanding of privacy and security of the platform  The patient has agreed to participate and understands he can discontinue the visit at any time  Patient is aware this is a billable service       Subjective    Yessenia Bustamante is a 64 y o  male     HPI   Follow-up  BP within normal with med and dietary modification  Last lab showed elevated amylase, lipase and lipids as well as increase in hemoglobin A1c to 6 9% from 5 6  Patient has made changes in his diet and exercise routine since-we will repeat labs prior to further eval   If labs for amylase lipase remained elevated to consider imaging  Patient asymptomatic-denies abdominal pain, nausea, vomiting or change in bowel/ bladder  Denies chest pain or increased shortness of breath  Continues efforts at weight reduction  Due to schedule eye and dental checks  Past Medical History:   Diagnosis Date    Erectile dysfunction     Hypertension     Obesity, Class II, BMI 35 0-39 9, with comorbidity (see actual BMI)     last assessed 12/5/14       No past surgical history on file  Current Outpatient Medications   Medication Sig Dispense Refill    amLODIPine (NORVASC) 10 mg tablet TAKE ONE TABLET BY MOUTH EVERY DAY 90 tablet 1    lisinopril (ZESTRIL) 2 5 mg tablet Take 1 tablet (2 5 mg total) by mouth daily 60 tablet 3    mupirocin (BACTROBAN) 2 % ointment APPLY TOPICALLY 3 (THREE) TIMES A DAY 22 g 0    tadalafil (CIALIS) 10 MG tablet Take 1 tablet (10 mg total) by mouth daily as needed for erectile dysfunction 10 tablet 0     No current facility-administered medications for this visit  Allergies   Allergen Reactions    Azithromycin Edema       Review of Systems   Constitutional: Positive for fatigue  Negative for fever  Cardiovascular: Negative for chest pain, palpitations and leg swelling  HTN   Endocrine:        DM-controlled   Musculoskeletal: Positive for arthralgias  Allergic/Immunologic: Positive for environmental allergies  Neurological: Negative for dizziness and headaches  Psychiatric/Behavioral: Positive for sleep disturbance  The patient is nervous/anxious  Vitals:    03/01/21 1255   BP: 126/80     AAOx3  NAD    Time Spent: 16 min    Bravo Ng acknowledges that he has consented to an online visit or consultation   He understands that the online visit is based solely on information provided by him, and that, in the absence of a face-to-face physical evaluation by the physician, the diagnosis he receives is both limited and provisional in terms of accuracy and completeness  This is not intended to replace a full medical face-to-face evaluation by the physician  Silver Bro understands and accepts these terms

## 2021-03-10 ENCOUNTER — OFFICE VISIT (OUTPATIENT)
Dept: FAMILY MEDICINE CLINIC | Facility: CLINIC | Age: 61
End: 2021-03-10
Payer: COMMERCIAL

## 2021-03-10 VITALS
DIASTOLIC BLOOD PRESSURE: 80 MMHG | BODY MASS INDEX: 32.24 KG/M2 | TEMPERATURE: 96.5 F | HEART RATE: 98 BPM | WEIGHT: 225.2 LBS | HEIGHT: 70 IN | SYSTOLIC BLOOD PRESSURE: 120 MMHG | RESPIRATION RATE: 14 BRPM

## 2021-03-10 DIAGNOSIS — E78.5 HYPERLIPIDEMIA, UNSPECIFIED HYPERLIPIDEMIA TYPE: ICD-10-CM

## 2021-03-10 DIAGNOSIS — I10 ESSENTIAL HYPERTENSION: Primary | ICD-10-CM

## 2021-03-10 DIAGNOSIS — E11.9 CONTROLLED TYPE 2 DIABETES MELLITUS WITHOUT COMPLICATION, WITHOUT LONG-TERM CURRENT USE OF INSULIN (HCC): ICD-10-CM

## 2021-03-10 PROCEDURE — 99214 OFFICE O/P EST MOD 30 MIN: CPT | Performed by: FAMILY MEDICINE

## 2021-03-10 NOTE — PROGRESS NOTES
Assessment/Plan:  Diagnoses and all orders for this visit:    Essential hypertension  Comments:  BP at goal on amlodipine alone  Cont low salt diet cont efforts at weight reduction  Sched  yearly eye exam    Controlled type 2 diabetes mellitus without complication, without long-term current use of insulin (HCC)  Comments:  cont ADA diet and efforts at weight reduction  Sched eye exam     Hyperlipidemia, unspecified hyperlipidemia type  Comments:  cont  low chol diet, increase exercise as tolerated    BMI 32 0-32 9,adult      BMI Counseling: Body mass index is 32 31 kg/m²  The BMI is above normal  Nutrition recommendations include decreasing portion sizes, encouraging healthy choices of fruits and vegetables, decreasing fast food intake, consuming healthier snacks, limiting drinks that contain sugar, moderation in carbohydrate intake, increasing intake of lean protein, reducing intake of saturated and trans fat and reducing intake of cholesterol  Exercise recommendations include exercising 3-5 times per week and strength training exercises  No pharmacotherapy was ordered  Subjective:      Patient ID: Zaire Rios is a 64 y o  male  Chief Complaint   Patient presents with    Hypertension     f/u    Hyperlipidemia    Diabetes       HPI  Follow-up  BP wnl on amlodipine alone--pt not taking the lisinopril  +intentional weight loss noted  Making efforts at increasing activity  No acute c/o  The following portions of the patient's history were reviewed and updated as appropriate: allergies, current medications, past family history, past medical history, past social history, past surgical history and problem list     Review of Systems   Constitutional: Positive for fatigue  Negative for fever  Respiratory: Negative  Cardiovascular: Negative  Gastrointestinal: Negative  Endocrine:        Abnormal glucose   Musculoskeletal: Positive for arthralgias and myalgias  Skin: Negative for rash  Allergic/Immunologic: Positive for environmental allergies  Neurological: Negative  Psychiatric/Behavioral: Positive for sleep disturbance  The patient is nervous/anxious  Current Outpatient Medications   Medication Sig Dispense Refill    amLODIPine (NORVASC) 10 mg tablet TAKE ONE TABLET BY MOUTH EVERY DAY 90 tablet 1    mupirocin (BACTROBAN) 2 % ointment APPLY TOPICALLY 3 (THREE) TIMES A DAY 22 g 0    tadalafil (CIALIS) 10 MG tablet Take 1 tablet (10 mg total) by mouth daily as needed for erectile dysfunction 10 tablet 0     No current facility-administered medications for this visit  Objective:    /80 (BP Location: Left arm, Patient Position: Sitting, Cuff Size: Large)   Pulse 98   Temp (!) 96 5 °F (35 8 °C)   Resp 14   Ht 5' 10" (1 778 m)   Wt 102 kg (225 lb 3 2 oz)   BMI 32 31 kg/m²        Physical Exam  Vitals signs and nursing note reviewed  Constitutional:       General: He is not in acute distress  Comments: OW   HENT:      Head: Normocephalic and atraumatic  Eyes:      General: No scleral icterus  Conjunctiva/sclera: Conjunctivae normal    Neck:      Musculoskeletal: Neck supple  No muscular tenderness  Cardiovascular:      Rate and Rhythm: Normal rate and regular rhythm  Pulses: Normal pulses  Pulmonary:      Effort: Pulmonary effort is normal  No respiratory distress  Breath sounds: Normal breath sounds  Abdominal:      General: Bowel sounds are normal       Palpations: Abdomen is soft  Tenderness: There is no abdominal tenderness  Musculoskeletal: Normal range of motion  Skin:     General: Skin is warm and dry  Capillary Refill: Capillary refill takes less than 2 seconds  Neurological:      General: No focal deficit present  Mental Status: He is alert and oriented to person, place, and time  Cranial Nerves: No cranial nerve deficit     Psychiatric:      Comments: anxious           Ned Salinas MD

## 2021-03-10 NOTE — PATIENT INSTRUCTIONS
Meal Planning with the Plate Method   AMBULATORY CARE:   Meal planning with the plate method  is a simple way for people with diabetes to plan meals  This method can help you eat the right amount of carbohydrates and keep your blood sugar levels under control  Carbohydrates naturally raise your blood sugar level  Your blood sugar level can rise too high if you eat too much carbohydrate at one time  Carbohydrates are found in starches (bread, cereal, starchy vegetables, and beans), fruit, milk, yogurt, and sweets  How to use the plate method to plan meals:   · Draw an imaginary line down the middle of a 9-inch dinner plate  On one side, draw another line to divide that section in half  Your plate will have 3 sections  · Fill the largest section of your plate with non-starchy vegetables  These include broccoli, spinach, cucumbers, peppers, cauliflower, and tomatoes  · Add a starch to 1 of the small sections of your plate  Starches include pasta, rice, whole-grain bread, tortillas, corn, potatoes, and beans  · Add meat or another source of protein to the other small section of your plate  Examples include chicken or turkey without skin, fish, lean beef or pork, low-fat cheese, tofu, or eggs  · Add dairy or fruit to the side of your plate if your meal plan allows  Examples of dairy include skim or 1% milk or low-fat yogurt  If you do not drink milk, you may be able to add another serving of starchy food instead  · Add a low-calorie or calorie-free drink such as water or unsweetened tea or coffee  Serving sizes of foods:   · Non-starchy vegetables:      ? ½ cup of cooked vegetables or 1 cup of raw vegetables    ? ½ cup of vegetable juice    · Starches:      ? 1 ounce of whole-wheat bread or 1 small (6 inch) flour or corn tortilla    ? 1 small (4 inch) pancake (about ¼ inch thick)    ? ¾ cup of dry, unsweetened, whole-grain ready-to-eat cereal or ¼ cup of low-fat granola    ?  ½ cup of cooked cereal or oatmeal    ? ? cup of rice or pasta     ? ½ cup of corn, green peas, sweet potatoes, or mashed potatoes    ? ½ cup of cooked beans and peas (garbanzo, jorge, kidney, white, split, black-eyed)    · Meat and other protein sources:      ? 3 to 4 ounces of any lean meat, fish, or poultry    ? ½ cup of tofu or tempeh    ? 1 large egg    ? 1½ ounces (about 2 tablespoons) of nuts or 2 tablespoons of peanut butter    · Fruit:      ? 1 small piece of fresh fruit     ? ½ cup of canned or fresh fruit or unsweetened fruit juice    ? ¼ cup of dried fruit    · Milk and yogurt:      ? 1 cup (8 ounces) of skim or 1% milk    ? ¾ cup (6 ounces) of plain, non-fat yogurt    Other guidelines to follow:   · Limit salt and sugar  Choose and prepare foods and drinks with less salt and added sugars  Use the nutrition information on food labels to help you make healthy choices  The percent daily value listed on the food label tells you whether a food is low or high in certain nutrients  A percent daily value of 5% or less means that the food is low in a nutrient  A percent daily value of 20% or more means that the food is high in a nutrient  · Choose healthy fats  Choose healthy fats such as polyunsaturated and monounsaturated fats in place of unhealthy fats  Healthy fats are found in vegetable oils such as soybean, corn, canola, olive, and sunflower oil  Unhealthy fats are saturated fats, trans fats, and cholesterol  Unhealthy fats are found in shortening, butter, stick margarine, and animal fat  · Ask your healthcare provider if alcohol is safe for you  and how much is safe for you  If you choose to drink alcohol, drink it with meals  When you drink alcohol on an empty stomach, your blood sugar may fall to a low level  © Copyright [address] Information is for End User's use only and may not be sold, redistributed or otherwise used for commercial purposes   All illustrations and images included in CareNotes® are the copyrighted property of A D A M [employer]  or [address]   The above information is an  only  It is not intended as medical advice for individual conditions or treatments  Talk to your doctor, nurse or pharmacist before following any medical regimen to see if it is safe and effective for you  Chronic Hypertension   AMBULATORY CARE:   Hypertension  is high blood pressure  Your blood pressure is the force of your blood moving against the walls of your arteries  Hypertension causes your blood pressure to get so high that your heart has to work much harder than normal  This can damage your heart  Even if you have hypertension for years, lifestyle changes, medicines, or both can help bring your blood pressure to normal   Call 911 for any of the following:   · You have chest pain  · You have any of the following signs of a heart attack:      ? Squeezing, pressure, or pain in your chest    ? You may  also have any of the following:     § Discomfort or pain in your back, neck, jaw, stomach, or arm    § Shortness of breath    § Nausea or vomiting    § Lightheadedness or a sudden cold sweat    · You become confused or have difficulty speaking  · You suddenly feel lightheaded or have trouble breathing  Seek care immediately if:   · You have a severe headache or vision loss  · You have weakness in an arm or leg  Contact your healthcare provider if:   · You feel faint, dizzy, confused, or drowsy  · You have been taking your blood pressure medicine but your pressure is higher than your provider says it should be  · You have questions or concerns about your condition or care  Treatment for chronic hypertension  may include medicine to lower your blood pressure and cholesterol levels  A low cholesterol level helps prevent heart disease and makes it easier to control your blood pressure  Heart disease can make your blood pressure harder to control   You may also need to make lifestyle changes  What you need to know about the stages of hypertension:       · Normal blood pressure is 119/79 or lower   Your healthcare provider may only check your blood pressure each year if it stays at a normal level  · Elevated blood pressure is 120/79 to 129/79   This is sometimes called prehypertension  Your healthcare provider may suggest lifestyle changes to help lower your blood pressure to a normal level  He or she may then check it again in 3 to 6 months  · Stage 1 hypertension is 130/80  to 139/89   Your provider may recommend lifestyle changes, medication, and checks every 3 to 6 months until your blood pressure is controlled  · Stage 2 hypertension is 140/90 or higher   Your provider will recommend lifestyle changes and have you take 2 kinds of hypertension medicines  You will also need to have your blood pressure checked monthly until it is controlled  Manage chronic hypertension:   · Check your blood pressure at home  Avoid smoking, caffeine, and exercise at least 30 minutes before checking your blood pressure  Sit and rest for 5 minutes before you take your blood pressure  Extend your arm and support it on a flat surface  Your arm should be at the same level as your heart  Follow the directions that came with your blood pressure monitor  Check your blood pressure 2 times, 1 minute apart, before you take your medicine in the morning  Also check your blood pressure before your evening meal  Keep a record of your readings and bring it to your follow-up visits  Ask your healthcare provider what your blood pressure should be  · Manage any other health conditions you have  Health conditions such as diabetes can increase your risk for hypertension  Follow your healthcare provider's instructions and take all your medicines as directed  Talk to your healthcare provider about any new health conditions you have recently developed  · Ask about all medicines    Certain medicines can increase your blood pressure  Examples include oral birth control pills, decongestants, herbal supplements, and NSAIDs, such as ibuprofen  Your healthcare provider can tell you which medicines are safe for you to take  This includes prescription and over-the-counter medicines  Lifestyle changes you can make to lower your blood pressure: Your provider may want you to make more lifestyle changes if you are having trouble controlling your blood pressure  This may feel difficult over time, especially if you think you are making good changes but your pressure is still high  It might help to focus on one new change at a time  For example, try to add 1 more day of exercise, or exercise for an extra 10 minutes on 2 days  Small changes can make a big difference  Your healthcare provider can also refer you to specialists such as a dietitian who can help you make small changes  · Limit sodium (salt) as directed  Too much sodium can affect your fluid balance  Check labels to find low-sodium or no-salt-added foods  Some low-sodium foods use potassium salts for flavor  Too much potassium can also cause health problems  Your healthcare provider will tell you how much sodium and potassium are safe for you to have in a day  He or she may recommend that you limit sodium to 2,300 mg a day  · Follow the meal plan recommended by your healthcare provider  A dietitian or your provider can give you more information on low-sodium plans or the DASH (Dietary Approaches to Stop Hypertension) eating plan  The DASH plan is low in sodium, unhealthy fats, and total fat  It is high in potassium, calcium, and fiber  · Exercise to maintain a healthy weight  Exercise at least 30 minutes per day, on most days of the week  This will help decrease your blood pressure  Ask your healthcare provider about the best exercise plan for you  · Decrease stress  This may help lower your blood pressure   Learn ways to relax, such as deep breathing or listening to music  · Limit alcohol as directed  Alcohol can increase your blood pressure  A drink of alcohol is 12 ounces of beer, 5 ounces of wine, or 1½ ounces of liquor  · Do not smoke  Nicotine and other chemicals in cigarettes and cigars can increase your blood pressure and also cause lung damage  Ask your healthcare provider for information if you currently smoke and need help to quit  E-cigarettes or smokeless tobacco still contain nicotine  Talk to your healthcare provider before you use these products  Follow up with your healthcare provider as directed: You will need to return to have your blood pressure checked and to have other lab tests done  Write down your questions so you remember to ask them during your visits  © Copyright [address] Information is for End User's use only and may not be sold, redistributed or otherwise used for commercial purposes  All illustrations and images included in CareNotes® are the copyrighted property of A D A M , Inc  or [facility] [address]   The above information is an  only  It is not intended as medical advice for individual conditions or treatments  Talk to your doctor, nurse or pharmacist before following any medical regimen to see if it is safe and effective for you

## 2021-03-11 LAB
ALBUMIN SERPL-MCNC: 4.4 G/DL (ref 3.8–4.9)
ALBUMIN/GLOB SERPL: 1.6 {RATIO} (ref 1.2–2.2)
ALP SERPL-CCNC: 75 IU/L (ref 39–117)
ALT SERPL-CCNC: 32 IU/L (ref 0–44)
AMYLASE SERPL-CCNC: 166 U/L (ref 31–110)
AST SERPL-CCNC: 24 IU/L (ref 0–40)
BILIRUB SERPL-MCNC: 1 MG/DL (ref 0–1.2)
BUN SERPL-MCNC: 9 MG/DL (ref 6–24)
BUN/CREAT SERPL: 9 (ref 9–20)
CALCIUM SERPL-MCNC: 9.5 MG/DL (ref 8.7–10.2)
CHLORIDE SERPL-SCNC: 102 MMOL/L (ref 96–106)
CHOLEST SERPL-MCNC: 223 MG/DL (ref 100–199)
CO2 SERPL-SCNC: 22 MMOL/L (ref 20–29)
CREAT SERPL-MCNC: 1.05 MG/DL (ref 0.76–1.27)
EST. AVERAGE GLUCOSE BLD GHB EST-MCNC: 143 MG/DL
GLOBULIN SER-MCNC: 2.7 G/DL (ref 1.5–4.5)
GLUCOSE SERPL-MCNC: 106 MG/DL (ref 65–99)
HBA1C MFR BLD: 6.6 % (ref 4.8–5.6)
HDLC SERPL-MCNC: 42 MG/DL
LDLC SERPL CALC-MCNC: 159 MG/DL (ref 0–99)
LDLC/HDLC SERPL: 3.8 RATIO (ref 0–3.6)
LIPASE SERPL-CCNC: 290 U/L (ref 13–78)
MICRODELETION SYND BLD/T FISH: NORMAL
POTASSIUM SERPL-SCNC: 4.1 MMOL/L (ref 3.5–5.2)
PROT SERPL-MCNC: 7.1 G/DL (ref 6–8.5)
SL AMB EGFR AFRICAN AMERICAN: 90 ML/MIN/1.73
SL AMB EGFR NON AFRICAN AMERICAN: 78 ML/MIN/1.73
SL AMB VLDL CHOLESTEROL CALC: 22 MG/DL (ref 5–40)
SODIUM SERPL-SCNC: 138 MMOL/L (ref 134–144)
TRIGL SERPL-MCNC: 124 MG/DL (ref 0–149)

## 2021-03-11 PROCEDURE — 3044F HG A1C LEVEL LT 7.0%: CPT | Performed by: INTERNAL MEDICINE

## 2021-03-15 ENCOUNTER — TELEPHONE (OUTPATIENT)
Dept: FAMILY MEDICINE CLINIC | Facility: CLINIC | Age: 61
End: 2021-03-15

## 2021-03-15 NOTE — TELEPHONE ENCOUNTER
----- Message from Yuri Barclay MD sent at 3/12/2021  1:28 PM EST -----  Left message for pt regarding abnormal lab results and need to check abdominal u/s as discussed at last visit--order in chart--please call to schedule appt once imaging completed to review-

## 2021-03-16 ENCOUNTER — TELEPHONE (OUTPATIENT)
Dept: FAMILY MEDICINE CLINIC | Facility: CLINIC | Age: 61
End: 2021-03-16

## 2021-03-16 ENCOUNTER — TELEMEDICINE (OUTPATIENT)
Dept: FAMILY MEDICINE CLINIC | Facility: CLINIC | Age: 61
End: 2021-03-16
Payer: COMMERCIAL

## 2021-03-16 DIAGNOSIS — E78.5 HYPERLIPIDEMIA, UNSPECIFIED HYPERLIPIDEMIA TYPE: ICD-10-CM

## 2021-03-16 DIAGNOSIS — E11.9 CONTROLLED TYPE 2 DIABETES MELLITUS WITHOUT COMPLICATION, WITHOUT LONG-TERM CURRENT USE OF INSULIN (HCC): Primary | ICD-10-CM

## 2021-03-16 DIAGNOSIS — I10 ESSENTIAL HYPERTENSION: ICD-10-CM

## 2021-03-16 DIAGNOSIS — R74.8 ELEVATED AMYLASE AND LIPASE: ICD-10-CM

## 2021-03-16 PROCEDURE — 99213 OFFICE O/P EST LOW 20 MIN: CPT | Performed by: FAMILY MEDICINE

## 2021-03-16 NOTE — TELEPHONE ENCOUNTER
----- Message from Fidel Murillo MD sent at 3/12/2021  1:28 PM EST -----  Left message for pt regarding abnormal lab results and need to check abdominal u/s as discussed at last visit--order in chart--please call to schedule appt once imaging completed to review-

## 2021-03-18 ENCOUNTER — HOSPITAL ENCOUNTER (OUTPATIENT)
Dept: RADIOLOGY | Facility: HOSPITAL | Age: 61
Discharge: HOME | End: 2021-03-18
Attending: FAMILY MEDICINE
Payer: COMMERCIAL

## 2021-03-18 DIAGNOSIS — R74.8 ELEVATED AMYLASE AND LIPASE: ICD-10-CM

## 2021-03-18 DIAGNOSIS — E78.5 HYPERLIPIDEMIA, UNSPECIFIED HYPERLIPIDEMIA TYPE: ICD-10-CM

## 2021-03-18 DIAGNOSIS — K80.20 GALLSTONES: Primary | ICD-10-CM

## 2021-03-18 DIAGNOSIS — K76.0 FATTY LIVER: ICD-10-CM

## 2021-03-18 DIAGNOSIS — R10.10 PAIN OF UPPER ABDOMEN: ICD-10-CM

## 2021-03-18 DIAGNOSIS — I10 ESSENTIAL HYPERTENSION: ICD-10-CM

## 2021-03-18 DIAGNOSIS — E11.9 CONTROLLED TYPE 2 DIABETES MELLITUS WITHOUT COMPLICATION, WITHOUT LONG-TERM CURRENT USE OF INSULIN (HCC): ICD-10-CM

## 2021-03-18 PROCEDURE — 76700 US EXAM ABDOM COMPLETE: CPT

## 2021-03-19 ENCOUNTER — TELEPHONE (OUTPATIENT)
Dept: FAMILY MEDICINE CLINIC | Facility: CLINIC | Age: 61
End: 2021-03-19

## 2021-03-19 NOTE — TELEPHONE ENCOUNTER
----- Message from Stephanie Fay MD sent at 3/18/2021  6:55 PM EDT -----  Please inform abd u/s shows gallstones and fatty liver, visualized pancreas looks normal   I put referral in chart for pt to see gastroenterologist--please give him info and ask him to schedule follow-up w me after he sees specialist-thanks

## 2021-03-22 ENCOUNTER — PREP FOR PROCEDURE (OUTPATIENT)
Dept: GASTROENTEROLOGY | Facility: CLINIC | Age: 61
End: 2021-03-22

## 2021-03-22 ENCOUNTER — CONSULT (OUTPATIENT)
Dept: GASTROENTEROLOGY | Facility: CLINIC | Age: 61
End: 2021-03-22
Payer: COMMERCIAL

## 2021-03-22 VITALS
DIASTOLIC BLOOD PRESSURE: 90 MMHG | HEIGHT: 70 IN | HEART RATE: 96 BPM | SYSTOLIC BLOOD PRESSURE: 145 MMHG | TEMPERATURE: 98 F | WEIGHT: 235 LBS | BODY MASS INDEX: 33.64 KG/M2

## 2021-03-22 DIAGNOSIS — K76.0 FATTY LIVER: ICD-10-CM

## 2021-03-22 DIAGNOSIS — Z12.11 SCREEN FOR COLON CANCER: ICD-10-CM

## 2021-03-22 DIAGNOSIS — Z20.822 ENCOUNTER FOR LABORATORY TESTING FOR COVID-19 VIRUS: ICD-10-CM

## 2021-03-22 DIAGNOSIS — K80.20 GALLSTONES: ICD-10-CM

## 2021-03-22 DIAGNOSIS — R74.8 ELEVATED AMYLASE AND LIPASE: Primary | ICD-10-CM

## 2021-03-22 DIAGNOSIS — R74.8 ELEVATED AMYLASE AND LIPASE: ICD-10-CM

## 2021-03-22 DIAGNOSIS — Z12.11 SCREEN FOR COLON CANCER: Primary | ICD-10-CM

## 2021-03-22 PROCEDURE — 3008F BODY MASS INDEX DOCD: CPT | Performed by: INTERNAL MEDICINE

## 2021-03-22 PROCEDURE — 3077F SYST BP >= 140 MM HG: CPT | Performed by: INTERNAL MEDICINE

## 2021-03-22 PROCEDURE — 3080F DIAST BP >= 90 MM HG: CPT | Performed by: INTERNAL MEDICINE

## 2021-03-22 PROCEDURE — 99204 OFFICE O/P NEW MOD 45 MIN: CPT | Performed by: INTERNAL MEDICINE

## 2021-03-22 PROCEDURE — 1036F TOBACCO NON-USER: CPT | Performed by: INTERNAL MEDICINE

## 2021-03-22 NOTE — ASSESSMENT & PLAN NOTE
Unclear etiology  Patient went for routine lab workup and denies any history of abdominal pain or alcohol use  Rule out any pancreatic occult lesions  Questionable non pancreatic source    -  schedule MRI/ MRCP    - explained to patient and his wife in detail about the significance of elevated pancreatic enzymes      -Repeat pancreatic enzymes in a couple of weeks

## 2021-03-22 NOTE — ASSESSMENT & PLAN NOTE
Found to have 1 small gallstone which has been asymptomatic     -  Explained to patient about the probable complications from gallstones which includes but not limited to cholecystitis, obstructive jaundice, cholangitis, acute pancreatitis    Discussed about the surgical evaluation but he would like to wait since it is a small asymptomatic stone

## 2021-03-22 NOTE — PROGRESS NOTES
Consultation - [facility]) [facility]  Ellie Roach 1960 male         Chief Complaint:   Elevated pancreatic enzymes, gallstones, fatty liver    HPI:   62-year-old obese male with history of hypertension was noted to have elevated amylase 166 and lipase 290 on routine lab workup  He denies any alcohol use  Patient denies any abdominal pain, nausea or vomiting  Good appetite, no recent weight loss  Regular bowel movements and denies any blood or mucus in the stool  He never had colonoscopy in the past   Denies any heartburn acid reflux  Denies any difficulty swallowing  Ultrasound showed fatty liver, 9 mm gallstone, normal pancreas    REVIEW OF SYSTEMS: Review of Systems   Constitutional: Negative for activity change, appetite change, chills, diaphoresis, fatigue, fever and unexpected weight change  HENT: Negative for ear discharge, ear pain, facial swelling, hearing loss, nosebleeds, sore throat, tinnitus and voice change  Eyes: Negative for pain, discharge, redness, itching and visual disturbance  Respiratory: Negative for apnea, cough, chest tightness, shortness of breath and wheezing  Cardiovascular: Negative for chest pain and palpitations  Gastrointestinal:        As noted in HPI   Endocrine: Negative for cold intolerance, heat intolerance and polyuria  Genitourinary: Negative for difficulty urinating, dysuria, flank pain, hematuria and urgency  Musculoskeletal: Positive for myalgias  Negative for arthralgias, back pain, gait problem and joint swelling  Skin: Negative for rash and wound  Neurological: Negative for dizziness, tremors, seizures, speech difficulty, light-headedness, numbness and headaches  Hematological: Negative for adenopathy  Does not bruise/bleed easily  Psychiatric/Behavioral: Negative for agitation, behavioral problems and confusion  The patient is not nervous/anxious           Past Medical History:   Diagnosis Date    Elevated pancreatic enzyme     Erectile dysfunction     Fatty liver     Gall bladder stones     Hyperlipidemia     Hypertension     Obesity, Class II, BMI 35 0-39 9, with comorbidity (see actual BMI)     last assessed 12/5/14      Past Surgical History:   Procedure Laterality Date    HERNIA REPAIR       Social History     Socioeconomic History    Marital status: /Civil Union     Spouse name: Not on file    Number of children: Not on file    Years of education: Not on file    Highest education level: Not on file   Occupational History    Not on file   Social Needs    Financial resource strain: Not on file    Food insecurity     Worry: Not on file     Inability: Not on file   [employer] needs     Medical: Not on file     Non-medical: Not on file   Tobacco Use    Smoking status: Never Smoker    Smokeless tobacco: Never Used   Substance and Sexual Activity    Alcohol use: No    Drug use: No    Sexual activity: Not on file   Lifestyle    Physical activity     Days per week: Not on file     Minutes per session: Not on file    Stress: Not on file   Relationships    Social connections     Talks on phone: Not on file     Gets together: Not on file     Attends [employer]ical service: Not on file     Active member of club or organization: Not on file     Attends meetings of clubs or organizations: Not on file     Relationship status: Not on file    Intimate partner violence     Fear of current or ex partner: Not on file     Emotionally abused: Not on file     Physically abused: Not on file     Forced sexual activity: Not on file   Other Topics Concern    Not on file   Social History Narrative    Not on file     Family History   Problem Relation Age of Onset    Hypertension Mother     Kidney disease Father         end stage    Dementia Father     Diabetes Father     Dementia Brother     Diabetes Brother      Azithromycin  Current Outpatient Medications   Medication Sig Dispense Refill    amLODIPine ([address]) 10 mg tablet TAKE ONE TABLET BY MOUTH EVERY DAY 90 tablet 1    tadalafil (CIALIS) 10 MG tablet Take 1 tablet (10 mg total) by mouth daily as needed for erectile dysfunction 10 tablet 0    mupirocin (BACTROBAN) 2 % ointment APPLY TOPICALLY 3 (THREE) TIMES A DAY (Patient not taking: Reported on 3/22/2021) 22 g 0     No current facility-administered medications for this visit  Blood pressure 145/90, pulse 96, temperature 98 °F (36 7 °C), height 5' 10" (1 778 m), weight 107 kg (235 lb)  PHYSICAL EXAM: Physical Exam  Constitutional:       Appearance: He is well-developed  HENT:      Head: Normocephalic and atraumatic  Eyes:      General: No scleral icterus  Right eye: No discharge  Left eye: No discharge  Conjunctiva/sclera: Conjunctivae normal       Pupils: Pupils are equal, round, and reactive to light  Neck:      Musculoskeletal: Neck supple  Thyroid: No thyromegaly  Vascular: No JVD  Trachea: No tracheal deviation  Cardiovascular:      Rate and Rhythm: Normal rate and regular rhythm  Heart sounds: Normal heart sounds  No murmur  No friction rub  No gallop  Pulmonary:      Effort: Pulmonary effort is normal  No respiratory distress  Breath sounds: Normal breath sounds  No wheezing or rales  Chest:      Chest wall: No tenderness  Abdominal:      General: Bowel sounds are normal  There is no distension  Palpations: Abdomen is soft  There is no mass  Tenderness: There is no abdominal tenderness  There is no guarding or rebound  Hernia: No hernia is present  Lymphadenopathy:      Cervical: No cervical adenopathy  Skin:     General: Skin is warm and dry  Findings: No erythema or rash  Neurological:      Mental Status: He is alert and oriented to person, place, and time  Psychiatric:         Behavior: Behavior normal          Thought Content:  Thought content normal           Lab Results   Component Value Date    WBC 4 8 11/19/2020    HGB 15 2 11/19/2020    HCT 44 2 11/19/2020    MCV 86 11/19/2020     11/19/2020     Lab Results   Component Value Date    GLUCOSE 101 (H) 08/23/2017    CALCIUM 9 6 08/23/2017     08/23/2017    K 4 1 03/10/2021    CO2 22 03/10/2021     03/10/2021    BUN 9 03/10/2021    CREATININE 1 05 03/10/2021     Lab Results   Component Value Date    ALT 32 03/10/2021    AST 24 03/10/2021    ALKPHOS 67 08/23/2017    BILITOT 0 7 08/23/2017     No results found for: INR, PROTIME    Us Abdomen Complete    Result Date: 3/18/2021  Impression: Moderate hepatic steatosis with focal fatty sparing Cholelithiasis  Workstation performed: [id number]       ASSESSMENT & PLAN:    Elevated amylase and lipase   Unclear etiology  Patient went for routine lab workup and denies any history of abdominal pain or alcohol use  Rule out any pancreatic occult lesions  Questionable non pancreatic source    -  schedule MRI/ MRCP    - explained to patient and his wife in detail about the significance of elevated pancreatic enzymes  -Repeat pancreatic enzymes in a couple of weeks    Gallstones   Found to have 1 small gallstone which has been asymptomatic     -  Explained to patient about the probable complications from gallstones which includes but not limited to cholecystitis, obstructive jaundice, cholangitis, acute pancreatitis  Discussed about the surgical evaluation but he would like to wait since it is a small asymptomatic stone    Fatty liver    Found to have fatty liver on the ultrasound the liver enzymes are normal   He also has high LDL  -Advised about low-fat diet, regular exercise, try to lose weight    -  Explained about the probable complications from fatty liver which could lead to HERNÁNDEZ and cirrhosis  Screen for colon cancer    Screening for colon cancer - patient is at average risk for colon cancer screening    Rule out colorectal lesions including polyps or malignancy         -Schedule for colonoscopy  -High-fiber diet     -Patient was given instructions about the colonoscopy prep     -Patient was explained about  the risks and benefits of the procedure  Risks including but not limited to bleeding, infection, perforation were explained in detail  Also explained about less than 100% sensitivity with the exam and other alternatives

## 2021-03-22 NOTE — ASSESSMENT & PLAN NOTE
Lab Results   Component Value Date    HGBA1C 6 6 (H) 03/10/2021   controlled  Cont ADA diet    sched eye exam

## 2021-03-22 NOTE — ASSESSMENT & PLAN NOTE
Found to have fatty liver on the ultrasound the liver enzymes are normal   He also has high LDL  -Advised about low-fat diet, regular exercise, try to lose weight    -  Explained about the probable complications from fatty liver which could lead to HERNÁNDEZ and cirrhosis

## 2021-03-28 NOTE — PROGRESS NOTES
Virtual Brief Visit    Assessment/Plan:    Problem List Items Addressed This Visit     Essential hypertension    Diabetes mellitus type II, controlled ([facility] [address] ) - Primary    Hyperlipidemia    Elevated amylase and lipase        BP at goal at last visit this month  Cont current meds  Check abd u/s--r/o GB disease as cause for inc pancreatic enzymes  Ref to GI, rpt labs  Dietary modifications, inc exercise  cont efforts at weight reduction  sched eye check        Reason for visit is   Chief Complaint   Patient presents with    Follow-up     results call [phone]    Virtual Brief Visit        Encounter provider Fidel Murillo MD    Provider located at [address] [place] [id number]    Recent Visits  No visits were found meeting these conditions  Showing recent visits within past 7 days and meeting all other requirements     Future Appointments  No visits were found meeting these conditions  Showing future appointments within next 150 days and meeting all other requirements        After connecting through telephone, the patient was identified by name and date of birth  Silver Bro was informed that this is a telemedicine visit and that the visit is being conducted through telephone  My office door was closed  No one else was in the room  He acknowledged consent and understanding of privacy and security of the platform  The patient has agreed to participate and understands he can discontinue the visit at any time  It was my intent to perform this visit via video technology but the patient was not able to do a video connection so the visit was completed via audio telephone only  Patient is aware this is a billable service  Subjective    Silver Bro is a 64 y o  male     HPI   lab follow-up  +Elevated  Amylase and lipase   CMP essentially within normal except for glucose of 121   hemoglobin A1c equal 6 6%;     lipids elevated patient denies abdominal pain, fever, chills, nausea or vomiting   nonsmoker, denies alcohol use   positive family history of diabetes otherwise no known history pancreatic disorder      Past Medical History:   Diagnosis Date    Elevated pancreatic enzyme     Erectile dysfunction     Fatty liver     Gall bladder stones     Hyperlipidemia     Hypertension     Obesity, Class II, BMI 35 0-39 9, with comorbidity (see actual BMI)     last assessed 12/5/14       Past Surgical History:   Procedure Laterality Date    HERNIA REPAIR         Current Outpatient Medications   Medication Sig Dispense Refill    amLODIPine (NORVASC) 10 mg tablet TAKE ONE TABLET BY MOUTH EVERY DAY 90 tablet 1    mupirocin (BACTROBAN) 2 % ointment APPLY TOPICALLY 3 (THREE) TIMES A DAY (Patient not taking: Reported on 3/22/2021) 22 g 0    tadalafil (CIALIS) 10 MG tablet Take 1 tablet (10 mg total) by mouth daily as needed for erectile dysfunction 10 tablet 0     No current facility-administered medications for this visit  Allergies   Allergen Reactions    Azithromycin Edema       Review of Systems   Constitutional: Positive for fatigue  Negative for fever  Respiratory: Negative  Cardiovascular: Negative  Gastrointestinal: Negative for abdominal pain  Endocrine:        DM   Musculoskeletal: Positive for arthralgias  Psychiatric/Behavioral: Positive for sleep disturbance  The patient is nervous/anxious  AAOx3  Anxious  I spent 20 minutes directly with the patient during this visit    [address] acknowledges that he has consented to an online visit or consultation  He understands that the online visit is based solely on information provided by him, and that, in the absence of a face-to-face physical evaluation by the physician, the diagnosis he receives is both limited and provisional in terms of accuracy and completeness   This is not intended to replace a full medical face-to-face evaluation by the physician  Israel Fontaine understands and accepts these terms

## 2021-03-29 ENCOUNTER — TELEPHONE (OUTPATIENT)
Dept: FAMILY MEDICINE CLINIC | Facility: CLINIC | Age: 61
End: 2021-03-29

## 2021-04-02 ENCOUNTER — TELEMEDICINE (OUTPATIENT)
Dept: FAMILY MEDICINE CLINIC | Facility: CLINIC | Age: 61
End: 2021-04-02
Payer: COMMERCIAL

## 2021-04-02 DIAGNOSIS — R74.8 ELEVATED AMYLASE AND LIPASE: ICD-10-CM

## 2021-04-02 DIAGNOSIS — K80.20 GALLSTONES: Primary | ICD-10-CM

## 2021-04-02 PROCEDURE — 99212 OFFICE O/P EST SF 10 MIN: CPT | Performed by: FAMILY MEDICINE

## 2021-04-02 NOTE — PROGRESS NOTES
Virtual Brief Visit    Assessment/Plan:    Problem List Items Addressed This Visit     Elevated amylase and lipase    Gallstones - Primary        Dietary modifications  Cont efforts at weight loss  sched gi follow-up post rpt amylase/lipase  await further recommendations re: MRI        Reason for visit is   Chief Complaint   Patient presents with    Virtual Brief Visit        Encounter provider Nell Villeda MD    Provider located at [address] [place] [phone]    Recent Visits  No visits were found meeting these conditions  Showing recent visits within past 7 days and meeting all other requirements     Future Appointments  No visits were found meeting these conditions  Showing future appointments within next 150 days and meeting all other requirements        After connecting through telephone, the patient was identified by name and date of birth  Napoleon Jones was informed that this is a telemedicine visit and that the visit is being conducted through telephone  My office door was closed  No one else was in the room  He acknowledged consent and understanding of privacy and security of the platform  The patient has agreed to participate and understands he can discontinue the visit at any time  It was my intent to perform this visit via video technology but the patient was not able to do a video connection so the visit was completed via audio telephone only  Patient is aware this is a billable service  Subjective    Napoleon Jones is a 64 y o  male     HPI   Follow-up  Recent abd u/s w findings fatty liver, gallstones, ?nl pancreas  Prior labs w [facility] amylase/lipae--following w GI-Dr Albert  Pt asymptomatic -denies abd pain/n/v  ?whether he could have passed gallstone as stool different    Past Medical History:   Diagnosis Date    Elevated pancreatic enzyme     Erectile dysfunction     Fatty liver     Gall bladder stones  Hyperlipidemia     Hypertension     Obesity, Class II, BMI 35 0-39 9, with comorbidity (see actual BMI)     last assessed 12/5/14       Past Surgical History:   Procedure Laterality Date    HERNIA REPAIR         Current Outpatient Medications   Medication Sig Dispense Refill    amLODIPine (NORVASC) 10 mg tablet TAKE ONE TABLET BY MOUTH EVERY DAY 90 tablet 1    mupirocin (BACTROBAN) 2 % ointment APPLY TOPICALLY 3 (THREE) TIMES A DAY (Patient not taking: Reported on 3/22/2021) 22 g 0    tadalafil (CIALIS) 10 MG tablet Take 1 tablet (10 mg total) by mouth daily as needed for erectile dysfunction 10 tablet 0     No current facility-administered medications for this visit  Allergies   Allergen Reactions    Azithromycin Edema       Review of Systems   Constitutional: Positive for fatigue  Negative for fever  Respiratory: Negative  Cardiovascular: Negative  Gastrointestinal: Negative for abdominal pain, blood in stool, diarrhea, nausea and vomiting  Gallstones, fatty liver   Musculoskeletal: Positive for arthralgias  Neurological: Negative  Psychiatric/Behavioral: Positive for sleep disturbance  The patient is nervous/anxious  AAOx3  Anxious  I spent 12 minutes directly with the patient during this visit    [facility] acknowledges that he has consented to an online visit or consultation  He understands that the online visit is based solely on information provided by him, and that, in the absence of a face-to-face physical evaluation by the physician, the diagnosis he receives is both limited and provisional in terms of accuracy and completeness  This is not intended to replace a full medical face-to-face evaluation by the physician  Lenin Griffin understands and accepts these terms

## 2021-04-16 LAB
AMYLASE SERPL-CCNC: 113 U/L (ref 31–110)
LIPASE SERPL-CCNC: 115 U/L (ref 13–78)

## 2021-04-26 ENCOUNTER — TELEPHONE (OUTPATIENT)
Dept: FAMILY MEDICINE CLINIC | Facility: CLINIC | Age: 61
End: 2021-04-26

## 2021-04-29 ENCOUNTER — IMMUNIZATIONS (OUTPATIENT)
Dept: FAMILY MEDICINE CLINIC | Facility: HOSPITAL | Age: 61
End: 2021-04-29

## 2021-04-29 DIAGNOSIS — Z23 ENCOUNTER FOR IMMUNIZATION: Primary | ICD-10-CM

## 2021-04-29 PROCEDURE — 91300 SARS-COV-2 / COVID-19 MRNA VACCINE (PFIZER-BIONTECH) 30 MCG: CPT

## 2021-04-29 PROCEDURE — 0001A SARS-COV-2 / COVID-19 MRNA VACCINE (PFIZER-BIONTECH) 30 MCG: CPT

## 2021-05-03 ENCOUNTER — HOSPITAL ENCOUNTER (OUTPATIENT)
Dept: RADIOLOGY | Facility: HOSPITAL | Age: 61
Discharge: HOME | End: 2021-05-03
Payer: COMMERCIAL

## 2021-05-03 ENCOUNTER — HOSPITAL ENCOUNTER (OUTPATIENT)
Dept: RADIOLOGY | Facility: HOSPITAL | Age: 61
Discharge: HOME | End: 2021-05-03
Attending: INTERNAL MEDICINE
Payer: COMMERCIAL

## 2021-05-03 DIAGNOSIS — R74.8 ELEVATED AMYLASE AND LIPASE: ICD-10-CM

## 2021-05-03 PROCEDURE — G1004 CDSM NDSC: HCPCS

## 2021-05-03 PROCEDURE — 74181 MRI ABDOMEN W/O CONTRAST: CPT

## 2021-05-18 ENCOUNTER — TELEPHONE (OUTPATIENT)
Dept: FAMILY MEDICINE CLINIC | Facility: CLINIC | Age: 61
End: 2021-05-18

## 2021-05-18 DIAGNOSIS — I10 ESSENTIAL HYPERTENSION: Primary | ICD-10-CM

## 2021-05-18 RX ORDER — AMLODIPINE BESYLATE 5 MG/1
5 TABLET ORAL DAILY
Qty: 90 TABLET | Refills: 1 | Status: SHIPPED | OUTPATIENT
Start: 2021-05-18 | End: 2021-11-02

## 2021-05-18 NOTE — TELEPHONE ENCOUNTER
Dr Jl Love:    Patient needs a refill of amlodipine 5MG sent to [facility]  He stated that at last appointment you discussed with him  Lowering the dose of this medication once he felt it was working  He feels that its time to lower the dose at this point  Please c/b should you need to discuss further

## 2021-05-18 NOTE — TELEPHONE ENCOUNTER
Please inform pt that I would recommend staying on the 5mg tab as a few of his diastolic readings(lower number) were mid 80-90s which is borderline high   If he is ok with this I will send over rx for the 5mg tabs--let me know-thanks

## 2021-05-18 NOTE — TELEPHONE ENCOUNTER
I spoke to Jaswant De La Fuente he said yes please send over 5mg to [facility] pharmacy tc/cma

## 2021-05-18 NOTE — TELEPHONE ENCOUNTER
S/w pt his bp"s are 5/9 122/85, 5/10 127/93, 5/11 120/83, 5/12 113/78, 5/13 124/85, 5/14 122/90, 5/15 116/80, 5/16 124/90, 5/17 121/81 and today was a little higher as he stated " I was scootin around the house up and down stairs"146/83 at 11:42 am and forgot meds first thing in am

## 2021-05-21 ENCOUNTER — IMMUNIZATIONS (OUTPATIENT)
Dept: FAMILY MEDICINE CLINIC | Facility: HOSPITAL | Age: 61
End: 2021-05-21

## 2021-05-21 DIAGNOSIS — Z23 ENCOUNTER FOR IMMUNIZATION: Primary | ICD-10-CM

## 2021-05-21 PROCEDURE — 91300 SARS-COV-2 / COVID-19 MRNA VACCINE (PFIZER-BIONTECH) 30 MCG: CPT

## 2021-05-21 PROCEDURE — 0002A SARS-COV-2 / COVID-19 MRNA VACCINE (PFIZER-BIONTECH) 30 MCG: CPT

## 2021-06-09 ENCOUNTER — TELEPHONE (OUTPATIENT)
Dept: FAMILY MEDICINE CLINIC | Facility: CLINIC | Age: 61
End: 2021-06-09

## 2021-06-09 DIAGNOSIS — N52.9 ERECTILE DYSFUNCTION, UNSPECIFIED ERECTILE DYSFUNCTION TYPE: ICD-10-CM

## 2021-06-10 DIAGNOSIS — N52.9 ERECTILE DYSFUNCTION, UNSPECIFIED ERECTILE DYSFUNCTION TYPE: ICD-10-CM

## 2021-06-10 RX ORDER — TADALAFIL 10 MG/1
10 TABLET ORAL DAILY PRN
Qty: 10 TABLET | Refills: 0 | Status: SHIPPED | OUTPATIENT
Start: 2021-06-10 | End: 2021-09-28

## 2021-06-10 NOTE — TELEPHONE ENCOUNTER
Dr Nair Patient:    Patient returned your call  He just needs [facility] [place]

## 2021-08-05 ENCOUNTER — TELEPHONE (OUTPATIENT)
Dept: FAMILY MEDICINE CLINIC | Facility: CLINIC | Age: 61
End: 2021-08-05

## 2021-09-08 ENCOUNTER — TELEPHONE (OUTPATIENT)
Dept: FAMILY MEDICINE CLINIC | Facility: CLINIC | Age: 61
End: 2021-09-08

## 2021-09-08 LAB
LEFT EYE DIABETIC RETINOPATHY: NORMAL
RIGHT EYE DIABETIC RETINOPATHY: NORMAL

## 2021-09-08 NOTE — TELEPHONE ENCOUNTER
Patient's wife called asking if you can put an order for Cologuard in patient's chart? Please advise

## 2021-09-08 NOTE — TELEPHONE ENCOUNTER
Order in chart--please confirm no fhx colon ca and no hx colon polyps otherwise colonoscopy recommended

## 2021-09-22 ENCOUNTER — OFFICE VISIT (OUTPATIENT)
Dept: FAMILY MEDICINE CLINIC | Facility: CLINIC | Age: 61
End: 2021-09-22
Payer: COMMERCIAL

## 2021-09-22 VITALS
SYSTOLIC BLOOD PRESSURE: 134 MMHG | HEART RATE: 66 BPM | TEMPERATURE: 97.3 F | WEIGHT: 235.6 LBS | DIASTOLIC BLOOD PRESSURE: 80 MMHG | RESPIRATION RATE: 14 BRPM | BODY MASS INDEX: 33.73 KG/M2 | HEIGHT: 70 IN

## 2021-09-22 DIAGNOSIS — K80.20 GALLSTONES: ICD-10-CM

## 2021-09-22 DIAGNOSIS — K76.0 FATTY LIVER: ICD-10-CM

## 2021-09-22 DIAGNOSIS — I10 ESSENTIAL HYPERTENSION: ICD-10-CM

## 2021-09-22 DIAGNOSIS — R74.8 ELEVATED AMYLASE AND LIPASE: ICD-10-CM

## 2021-09-22 DIAGNOSIS — E08.65 DIABETES MELLITUS DUE TO UNDERLYING CONDITION, UNCONTROLLED, WITH HYPERGLYCEMIA (HCC): Primary | ICD-10-CM

## 2021-09-22 LAB — SL AMB POCT GLUCOSE BLD: 119

## 2021-09-22 PROCEDURE — 82948 REAGENT STRIP/BLOOD GLUCOSE: CPT | Performed by: FAMILY MEDICINE

## 2021-09-22 PROCEDURE — 99214 OFFICE O/P EST MOD 30 MIN: CPT | Performed by: FAMILY MEDICINE

## 2021-09-22 RX ORDER — METFORMIN HYDROCHLORIDE 500 MG/1
500 TABLET, EXTENDED RELEASE ORAL
Qty: 90 TABLET | Refills: 1 | Status: SHIPPED | OUTPATIENT
Start: 2021-09-22 | End: 2021-12-17 | Stop reason: ALTCHOICE

## 2021-09-23 ENCOUNTER — TELEPHONE (OUTPATIENT)
Dept: ADMINISTRATIVE | Facility: OTHER | Age: 61
End: 2021-09-23

## 2021-09-23 NOTE — LETTER
Diabetic Eye Exam Form    Date Requested: 21  Patient: Ania Guerrero  Patient : 1960   Referring Provider: Jenny Meier MD    Dilated Retinal Exam, Optomap-Iris Exam, or Fundus Photography Done         Yes ([place] one above)         No     Date of Diabetic Eye Exam ______________________________  Left Eye      Exam did show retinopathy    Exam did not show retinopathy         Mild       Moderate       None       Proliferative       Severe     Right Eye     Exam did show retinopathy    Exam did not show retinopathy         Mild       Moderate       None       Proliferative       Severe     Comments __________________________________________________________    Practice Providing Exam ______________________________________________    Exam Performed By (print name) _______________________________________      Provider Signature ___________________________________________________      These reports are needed for  compliance  Please fax this completed form and a copy of the Diabetic Eye Exam report to our office located at [place] [address] as soon as possible to [phone] dolly Maradiaga: Phone [phone]    We thank you for your assistance in treating our mutual patient

## 2021-09-23 NOTE — TELEPHONE ENCOUNTER
Upon review of the In Basket request and the patient's chart, initial outreach has been made via telephone call, please see Contacts section for details   said they need it in writing with a release of info signed  Faxing request  [phone]    Could not find a release of records, so I faxed request to see if we get anything back      Thank you  Shelley Maurice MA

## 2021-09-23 NOTE — TELEPHONE ENCOUNTER
----- Message from Guillermo Palomino, [address] sent at 9/22/2021  1:13 PM EDT -----  Regarding: dm eye result  09/22/21 1:13 PM    Hello, our patient Genaro Fowler has had dm eye exam completed/performed  Please assist in updating the patient chart by [facility] eye [facility] Dr Jenine Nissen [phone] The date of service is sept 8 2021      Thank you,  ESAU Aguilera PG

## 2021-09-24 NOTE — TELEPHONE ENCOUNTER
Upon review of the In Basket request we Request came back with note that patient hasn't been seen since 2018    Any additional questions or concerns should be emailed to the Practice Liaisons via [email or web address] email, please do not reply via In Basket      Thank you  Yesenia Joiner MA

## 2021-09-28 DIAGNOSIS — N52.9 ERECTILE DYSFUNCTION, UNSPECIFIED ERECTILE DYSFUNCTION TYPE: ICD-10-CM

## 2021-09-28 RX ORDER — TADALAFIL 10 MG/1
10 TABLET ORAL DAILY PRN
Qty: 10 TABLET | Refills: 0 | Status: SHIPPED | OUTPATIENT
Start: 2021-09-28 | End: 2022-05-16

## 2021-10-13 ENCOUNTER — OFFICE VISIT (OUTPATIENT)
Dept: FAMILY MEDICINE CLINIC | Facility: CLINIC | Age: 61
End: 2021-10-13
Payer: COMMERCIAL

## 2021-10-13 ENCOUNTER — TELEPHONE (OUTPATIENT)
Dept: ADMINISTRATIVE | Facility: OTHER | Age: 61
End: 2021-10-13

## 2021-10-13 VITALS
SYSTOLIC BLOOD PRESSURE: 130 MMHG | WEIGHT: 228.4 LBS | HEIGHT: 70 IN | BODY MASS INDEX: 32.7 KG/M2 | RESPIRATION RATE: 16 BRPM | DIASTOLIC BLOOD PRESSURE: 82 MMHG | HEART RATE: 62 BPM | TEMPERATURE: 97.1 F

## 2021-10-13 DIAGNOSIS — L08.9 INSECT BITES OF MULTIPLE SITES, INFECTED: Primary | ICD-10-CM

## 2021-10-13 DIAGNOSIS — T07.XXXA INSECT BITES OF MULTIPLE SITES, INFECTED: Primary | ICD-10-CM
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
## 2021-10-13 PROCEDURE — 99213 OFFICE O/P EST LOW 20 MIN: CPT | Performed by: FAMILY MEDICINE

## 2021-10-13 PROCEDURE — 3008F BODY MASS INDEX DOCD: CPT | Performed by: FAMILY MEDICINE

## 2021-10-13 RX ORDER — BLOOD-GLUCOSE METER
EACH MISCELLANEOUS
COMMUNITY
Start: 2021-09-23

## 2021-10-13 RX ORDER — LANCETS 33 GAUGE
EACH MISCELLANEOUS
COMMUNITY
Start: 2021-09-23 | End: 2021-10-27 | Stop reason: SDUPTHER

## 2021-10-13 RX ORDER — BLOOD SUGAR DIAGNOSTIC
STRIP MISCELLANEOUS
COMMUNITY
Start: 2021-09-23 | End: 2021-10-27 | Stop reason: SDUPTHER

## 2021-10-13 RX ORDER — AMOXICILLIN 875 MG/1
875 TABLET, COATED ORAL 2 TIMES DAILY
Qty: 14 TABLET | Refills: 0 | Status: SHIPPED | OUTPATIENT
Start: 2021-10-13 | End: 2021-10-20

## 2021-10-27 DIAGNOSIS — E08.65 DIABETES MELLITUS DUE TO UNDERLYING CONDITION, UNCONTROLLED, WITH HYPERGLYCEMIA (HCC): Primary | ICD-10-CM

## 2021-10-27 RX ORDER — LANCETS 33 GAUGE
EACH MISCELLANEOUS DAILY
Qty: 100 EACH | Refills: 3 | Status: SHIPPED | OUTPATIENT
Start: 2021-10-27 | End: 2022-07-15

## 2021-10-27 RX ORDER — BLOOD SUGAR DIAGNOSTIC
1 STRIP MISCELLANEOUS DAILY
Qty: 100 STRIP | Refills: 3 | Status: SHIPPED | OUTPATIENT
Start: 2021-10-27 | End: 2021-12-07 | Stop reason: SDUPTHER

## 2021-10-31 ENCOUNTER — TELEPHONE (OUTPATIENT)
Dept: OTHER | Facility: OTHER | Age: 61
End: 2021-10-31

## 2021-10-31 ENCOUNTER — NURSE TRIAGE (OUTPATIENT)
Dept: OTHER | Facility: OTHER | Age: 61
End: 2021-10-31

## 2021-11-01 ENCOUNTER — OFFICE VISIT (OUTPATIENT)
Dept: FAMILY MEDICINE CLINIC | Facility: CLINIC | Age: 61
End: 2021-11-01
Payer: COMMERCIAL

## 2021-11-01 VITALS
HEIGHT: 70 IN | TEMPERATURE: 97.2 F | DIASTOLIC BLOOD PRESSURE: 80 MMHG | OXYGEN SATURATION: 98 % | SYSTOLIC BLOOD PRESSURE: 114 MMHG | WEIGHT: 222.4 LBS | RESPIRATION RATE: 16 BRPM | HEART RATE: 68 BPM | BODY MASS INDEX: 31.84 KG/M2

## 2021-11-01 DIAGNOSIS — A69.20 ERYTHEMA MIGRANS (LYME DISEASE): Primary | ICD-10-CM

## 2021-11-01 PROCEDURE — 3079F DIAST BP 80-89 MM HG: CPT | Performed by: FAMILY MEDICINE

## 2021-11-01 PROCEDURE — 1036F TOBACCO NON-USER: CPT | Performed by: FAMILY MEDICINE

## 2021-11-01 PROCEDURE — 3008F BODY MASS INDEX DOCD: CPT | Performed by: FAMILY MEDICINE

## 2021-11-01 PROCEDURE — 3074F SYST BP LT 130 MM HG: CPT | Performed by: FAMILY MEDICINE

## 2021-11-01 PROCEDURE — 99213 OFFICE O/P EST LOW 20 MIN: CPT | Performed by: FAMILY MEDICINE

## 2021-11-01 RX ORDER — DOXYCYCLINE HYCLATE 100 MG
100 TABLET ORAL 2 TIMES DAILY
Qty: 28 TABLET | Refills: 0 | Status: SHIPPED | OUTPATIENT
Start: 2021-11-01 | End: 2021-11-15

## 2021-11-02 DIAGNOSIS — I10 ESSENTIAL HYPERTENSION: ICD-10-CM

## 2021-11-02 RX ORDER — AMLODIPINE BESYLATE 5 MG/1
5 TABLET ORAL DAILY
Qty: 90 TABLET | Refills: 1 | Status: SHIPPED | OUTPATIENT
Start: 2021-11-02 | End: 2021-12-17 | Stop reason: ALTCHOICE
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
## 2021-11-03 PROBLEM — S40.862A INSECT BITE OF LEFT ARM: Status: ACTIVE | Noted: 2021-11-03

## 2021-11-26 LAB
B BURGDOR IGG+IGM SER-ACNC: <0.91 ISR (ref 0–0.9)
B MICROTI IGG TITR SER: NORMAL {TITER}
B MICROTI IGM TITR SER: NORMAL {TITER}

## 2021-12-09 ENCOUNTER — TELEPHONE (OUTPATIENT)
Dept: FAMILY MEDICINE CLINIC | Facility: CLINIC | Age: 61
End: 2021-12-09

## 2021-12-10 ENCOUNTER — RA CDI HCC (OUTPATIENT)
Dept: OTHER | Facility: HOSPITAL | Age: 61
End: 2021-12-10

## 2021-12-13 PROBLEM — E11.65 TYPE 2 DIABETES MELLITUS WITH HYPERGLYCEMIA (HCC): Status: ACTIVE | Noted: 2021-12-13

## 2021-12-17 ENCOUNTER — OFFICE VISIT (OUTPATIENT)
Dept: FAMILY MEDICINE CLINIC | Facility: CLINIC | Age: 61
End: 2021-12-17
Payer: COMMERCIAL

## 2021-12-17 VITALS
WEIGHT: 214.2 LBS | TEMPERATURE: 96.7 F | BODY MASS INDEX: 30.67 KG/M2 | DIASTOLIC BLOOD PRESSURE: 84 MMHG | SYSTOLIC BLOOD PRESSURE: 126 MMHG | RESPIRATION RATE: 16 BRPM | HEIGHT: 70 IN | HEART RATE: 60 BPM

## 2021-12-17 DIAGNOSIS — E11.9 TYPE 2 DIABETES MELLITUS WITHOUT COMPLICATION, WITHOUT LONG-TERM CURRENT USE OF INSULIN (HCC): Primary | ICD-10-CM

## 2021-12-17 DIAGNOSIS — E78.2 MIXED HYPERLIPIDEMIA: ICD-10-CM

## 2021-12-17 DIAGNOSIS — I10 ESSENTIAL HYPERTENSION: ICD-10-CM

## 2021-12-17 LAB
SL AMB  POCT GLUCOSE, UA: NORMAL
SL AMB LEUKOCYTE ESTERASE,UA: NORMAL
SL AMB POCT BILIRUBIN,UA: NORMAL
SL AMB POCT BLOOD,UA: NORMAL
SL AMB POCT CLARITY,UA: CLEAR
SL AMB POCT COLOR,UA: YELLOW
SL AMB POCT HEMOGLOBIN AIC: 5.9 (ref ?–6.5)
SL AMB POCT KETONES,UA: NORMAL
SL AMB POCT NITRITE,UA: NORMAL
SL AMB POCT PH,UA: 5
SL AMB POCT SPECIFIC GRAVITY,UA: 1.02
SL AMB POCT URINE PROTEIN: NORMAL
SL AMB POCT UROBILINOGEN: NORMAL

## 2021-12-17 PROCEDURE — 3074F SYST BP LT 130 MM HG: CPT | Performed by: FAMILY MEDICINE

## 2021-12-17 PROCEDURE — 3079F DIAST BP 80-89 MM HG: CPT | Performed by: FAMILY MEDICINE

## 2021-12-17 PROCEDURE — 99213 OFFICE O/P EST LOW 20 MIN: CPT | Performed by: FAMILY MEDICINE

## 2021-12-17 PROCEDURE — 83036 HEMOGLOBIN GLYCOSYLATED A1C: CPT | Performed by: FAMILY MEDICINE

## 2021-12-17 PROCEDURE — 1036F TOBACCO NON-USER: CPT | Performed by: FAMILY MEDICINE

## 2021-12-17 PROCEDURE — 3008F BODY MASS INDEX DOCD: CPT | Performed by: FAMILY MEDICINE

## 2021-12-17 PROCEDURE — 81003 URINALYSIS AUTO W/O SCOPE: CPT | Performed by: FAMILY MEDICINE

## 2021-12-17 PROCEDURE — 3044F HG A1C LEVEL LT 7.0%: CPT | Performed by: FAMILY MEDICINE

## 2021-12-20 LAB
ALBUMIN/CREAT UR: 2 MG/G CREAT (ref 0–29)
CREAT UR-MCNC: 215.6 MG/DL
MICROALBUMIN UR-MCNC: 5.3 UG/ML

## 2021-12-20 PROCEDURE — 3061F NEG MICROALBUMINURIA REV: CPT | Performed by: FAMILY MEDICINE

## 2022-01-21 ENCOUNTER — TELEPHONE (OUTPATIENT)
Dept: FAMILY MEDICINE CLINIC | Facility: CLINIC | Age: 62
End: 2022-01-21

## 2022-01-21 NOTE — TELEPHONE ENCOUNTER
Patient only has 16 teeth left in his mouth and needs implants put in full mouth (2 arches) -he wants to make sure you think he is healthy enough to do this ?  Please advise tc/cma

## 2022-01-21 NOTE — TELEPHONE ENCOUNTER
Patient is requesting call back from Dr Linnea Rich to discuss his appointment scheduled 1/24 with Dr Thor Aase to discuss getting dental implants

## 2022-01-25 ENCOUNTER — TELEPHONE (OUTPATIENT)
Dept: FAMILY MEDICINE CLINIC | Facility: CLINIC | Age: 62
End: 2022-01-25

## 2022-01-25 NOTE — TELEPHONE ENCOUNTER
[facility]:    Patient has surgery scheduled for dental implants and they are requesting COVID test   Wife called asking if you can place an order in the chart? Please advise

## 2022-02-21 ENCOUNTER — TELEPHONE (OUTPATIENT)
Dept: FAMILY MEDICINE CLINIC | Facility: CLINIC | Age: 62
End: 2022-02-21

## 2022-02-21 ENCOUNTER — CLINICAL SUPPORT (OUTPATIENT)
Dept: FAMILY MEDICINE CLINIC | Facility: CLINIC | Age: 62
End: 2022-02-21

## 2022-02-21 DIAGNOSIS — Z01.818 PRE-OP EXAMINATION: Primary | ICD-10-CM

## 2022-02-21 PROCEDURE — U0005 INFEC AGEN DETEC AMPLI PROBE: HCPCS | Performed by: FAMILY MEDICINE

## 2022-02-21 PROCEDURE — U0003 INFECTIOUS AGENT DETECTION BY NUCLEIC ACID (DNA OR RNA); SEVERE ACUTE RESPIRATORY SYNDROME CORONAVIRUS 2 (SARS-COV-2) (CORONAVIRUS DISEASE [COVID-19]), AMPLIFIED PROBE TECHNIQUE, MAKING USE OF HIGH THROUGHPUT TECHNOLOGIES AS DESCRIBED BY CMS-2020-01-R: HCPCS | Performed by: FAMILY MEDICINE

## 2022-04-05 ENCOUNTER — OFFICE VISIT (OUTPATIENT)
Dept: FAMILY MEDICINE CLINIC | Facility: CLINIC | Age: 62
End: 2022-04-05
Payer: COMMERCIAL

## 2022-04-05 VITALS
HEART RATE: 70 BPM | BODY MASS INDEX: 31.7 KG/M2 | DIASTOLIC BLOOD PRESSURE: 88 MMHG | WEIGHT: 221.4 LBS | RESPIRATION RATE: 16 BRPM | TEMPERATURE: 97.4 F | HEIGHT: 70 IN | SYSTOLIC BLOOD PRESSURE: 130 MMHG

## 2022-04-05 DIAGNOSIS — I10 ESSENTIAL HYPERTENSION: Primary | ICD-10-CM

## 2022-04-05 PROCEDURE — 99213 OFFICE O/P EST LOW 20 MIN: CPT | Performed by: FAMILY MEDICINE

## 2022-04-05 PROCEDURE — 3008F BODY MASS INDEX DOCD: CPT | Performed by: FAMILY MEDICINE

## 2022-04-05 PROCEDURE — 1036F TOBACCO NON-USER: CPT | Performed by: FAMILY MEDICINE

## 2022-04-05 PROCEDURE — 3079F DIAST BP 80-89 MM HG: CPT | Performed by: FAMILY MEDICINE

## 2022-04-05 PROCEDURE — 3075F SYST BP GE 130 - 139MM HG: CPT | Performed by: FAMILY MEDICINE

## 2022-04-05 RX ORDER — AMLODIPINE BESYLATE 5 MG/1
5 TABLET ORAL DAILY
COMMUNITY
End: 2022-05-31
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
## 2022-05-05 ENCOUNTER — OFFICE VISIT (OUTPATIENT)
Dept: URGENT CARE | Facility: CLINIC | Age: 62
End: 2022-05-05
Payer: COMMERCIAL

## 2022-05-05 VITALS
HEART RATE: 67 BPM | TEMPERATURE: 97.9 F | RESPIRATION RATE: 18 BRPM | DIASTOLIC BLOOD PRESSURE: 94 MMHG | SYSTOLIC BLOOD PRESSURE: 149 MMHG | BODY MASS INDEX: 32.07 KG/M2 | WEIGHT: 224 LBS | HEIGHT: 70 IN | OXYGEN SATURATION: 100 %

## 2022-05-05 DIAGNOSIS — L25.5 CONTACT DERMATITIS DUE TO PLANT: Primary | ICD-10-CM

## 2022-05-05 PROCEDURE — 99203 OFFICE O/P NEW LOW 30 MIN: CPT | Performed by: PHYSICIAN ASSISTANT

## 2022-05-05 RX ORDER — TRIAMCINOLONE ACETONIDE 5 MG/G
CREAM TOPICAL 2 TIMES DAILY
Qty: 30 G | Refills: 0 | Status: SHIPPED | OUTPATIENT
Start: 2022-05-05 | End: 2022-05-18

## 2022-05-05 NOTE — PROGRESS NOTES
[facility]        NAME: Jennifer Avendaño is a 63 y o  male  : 1960    MRN: [id number]  DATE: May 5, 2022  TIME: 6:00 PM    Assessment and Plan   Contact dermatitis due to plant [L25 5]  1  Contact dermatitis due to plant  triamcinolone (KENALOG) 0 5 % cream     Pt is diabetic so will hold off on po steroids  Advised pt to use kenalog for 2 full weeks but not to use on face  If spreading or worsening or not resolving, follow with pcp  Discussed strict return to care precautions as well as red flag symptoms which should prompt immediate ED referral  Pt verbalized understanding and is in agreement with plan  Please follow up with your primary care provider within the next week  Please remember that your visit today was with an urgent care provider and should not replace follow up with your primary care provider for chronic medical issues or annual physicals  Patient Instructions       Follow up with PCP in 3-5 days  Proceed to  ER if symptoms worsen  Chief Complaint     Chief Complaint   Patient presents with    Rash     rsh on bilateral arms was working outside around foliage         History of Present Illness       Pt pw itchy rash to b/l forearms after doing yardwork at family member's house  No wheezing, n/v, weakness, pain, bleeding, or drainage  No otc meds tried  Review of Systems   Review of Systems   Constitutional: Negative for chills, diaphoresis and fever  HENT: Negative for congestion, rhinorrhea and sore throat  Eyes: Negative for discharge and itching  Respiratory: Negative for cough, chest tightness, shortness of breath and wheezing  Cardiovascular: Negative for chest pain  Gastrointestinal: Negative for diarrhea, nausea and vomiting  Musculoskeletal: Negative for myalgias  Skin: Positive for rash  Neurological: Negative for weakness and numbness           Current Medications       Current Outpatient Medications:     Alcohol Swabs (Alcohol Prep) 70 % PADS, USE AS DIRECTED, Disp: 100 each, Rfl: 2    amLODIPine (NORVASC) 5 mg tablet, Take 5 mg by mouth daily, Disp: , Rfl:     Blood Glucose Monitoring Suppl (ONE TOUCH ULTRA 2) w/Device KIT, TEST 1 TO 2 TIMES A DAY AS DIRECTED, Disp: , Rfl:     OneTouch Delica Lancets 21U MISC, by Device route daily, Disp: 100 each, Rfl: 3    OneTouch Ultra test strip, Use 1 each daily Use as instructed, Disp: 100 strip, Rfl: 3    tadalafil (CIALIS) 10 MG tablet, TAKE 1 TABLET (10 MG TOTAL) BY MOUTH DAILY AS NEEDED FOR ERECTILE DYSFUNCTION, Disp: 10 tablet, Rfl: 0    triamcinolone (KENALOG) 0 5 % cream, Apply topically 2 (two) times a day for 14 days, Disp: 30 g, Rfl: 0    Current Allergies     Allergies as of 05/05/2022 - Reviewed 05/05/2022   Allergen Reaction Noted    Azithromycin Edema 05/19/2014            The following portions of the patient's history were reviewed and updated as appropriate: allergies, current medications, past family history, past medical history, past social history, past surgical history and problem list      Past Medical History:   Diagnosis Date    Elevated pancreatic enzyme     Erectile dysfunction     Fatty liver     Gall bladder stones     Hyperlipidemia     Hypertension     Obesity, Class II, BMI 35 0-39 9, with comorbidity (see actual BMI)     last assessed 12/5/14       Past Surgical History:   Procedure Laterality Date    HERNIA REPAIR         Family History   Problem Relation Age of Onset    Hypertension Mother     Kidney disease Father         end stage    Dementia Father     Diabetes Father     Dementia Brother     Diabetes Brother          Medications have been verified  Objective   /94   Pulse 67   Temp 97 9 °F (36 6 °C)   Resp 18   Ht 5' 10" (1 778 m)   Wt 102 kg (224 lb)   SpO2 100%   BMI 32 14 kg/m²        Physical Exam     Physical Exam  Vitals and nursing note reviewed  Constitutional:       General: He is not in acute distress       Appearance: Normal appearance  He is not ill-appearing  HENT:      Head: Normocephalic and atraumatic  Cardiovascular:      Rate and Rhythm: Normal rate  Pulmonary:      Effort: Pulmonary effort is normal  No respiratory distress  Skin:     General: Skin is warm and dry  Capillary Refill: Capillary refill takes less than 2 seconds  Findings: Rash present  Rash is papular (papular rash present to b/l forearms and R posterior neck)  Neurological:      Mental Status: He is alert and oriented to person, place, and time     Psychiatric:         Behavior: Behavior normal

## 2022-05-06 ENCOUNTER — TELEPHONE (OUTPATIENT)
Dept: FAMILY MEDICINE CLINIC | Facility: CLINIC | Age: 62
End: 2022-05-06

## 2022-05-06 ENCOUNTER — TELEMEDICINE (OUTPATIENT)
Dept: FAMILY MEDICINE CLINIC | Facility: CLINIC | Age: 62
End: 2022-05-06
Payer: COMMERCIAL

## 2022-05-06 VITALS
BODY MASS INDEX: 32.07 KG/M2 | WEIGHT: 224 LBS | DIASTOLIC BLOOD PRESSURE: 94 MMHG | SYSTOLIC BLOOD PRESSURE: 157 MMHG | HEIGHT: 70 IN | HEART RATE: 73 BPM | TEMPERATURE: 96.6 F

## 2022-05-06 DIAGNOSIS — L25.9 CONTACT DERMATITIS, UNSPECIFIED CONTACT DERMATITIS TYPE, UNSPECIFIED TRIGGER: Primary | ICD-10-CM

## 2022-05-06 PROCEDURE — 3008F BODY MASS INDEX DOCD: CPT | Performed by: FAMILY MEDICINE

## 2022-05-06 PROCEDURE — 99213 OFFICE O/P EST LOW 20 MIN: CPT | Performed by: FAMILY MEDICINE

## 2022-05-06 RX ORDER — METHYLPREDNISOLONE 4 MG/1
TABLET ORAL
Qty: 21 EACH | Refills: 0 | Status: SHIPPED | OUTPATIENT
Start: 2022-05-06

## 2022-05-16 DIAGNOSIS — N52.9 ERECTILE DYSFUNCTION, UNSPECIFIED ERECTILE DYSFUNCTION TYPE: ICD-10-CM

## 2022-05-16 RX ORDER — TADALAFIL 10 MG/1
10 TABLET ORAL DAILY PRN
Qty: 10 TABLET | Refills: 0 | Status: SHIPPED | OUTPATIENT
Start: 2022-05-16 | End: 2022-05-31

## 2022-05-18 DIAGNOSIS — L25.5 CONTACT DERMATITIS DUE TO PLANT: ICD-10-CM

## 2022-05-18 RX ORDER — TRIAMCINOLONE ACETONIDE 5 MG/G
CREAM TOPICAL 2 TIMES DAILY
Qty: 30 G | Refills: 0 | Status: SHIPPED | OUTPATIENT
Start: 2022-05-18 | End: 2022-06-01

## 2022-05-31 DIAGNOSIS — N52.9 ERECTILE DYSFUNCTION, UNSPECIFIED ERECTILE DYSFUNCTION TYPE: ICD-10-CM

## 2022-05-31 RX ORDER — TADALAFIL 10 MG/1
10 TABLET ORAL DAILY PRN
Qty: 10 TABLET | Refills: 0 | Status: SHIPPED | OUTPATIENT
Start: 2022-05-31

## 2022-06-28 DIAGNOSIS — E08.65 DIABETES MELLITUS DUE TO UNDERLYING CONDITION, UNCONTROLLED, WITH HYPERGLYCEMIA (HCC): ICD-10-CM

## 2022-06-28 DIAGNOSIS — E08.65 DIABETES MELLITUS DUE TO UNDERLYING CONDITION, UNCONTROLLED, WITH HYPERGLYCEMIA (HCC): Primary | ICD-10-CM

## 2022-06-28 RX ORDER — BLOOD-GLUCOSE METER
KIT MISCELLANEOUS
Qty: 1 KIT | Refills: 0 | Status: SHIPPED | OUTPATIENT
Start: 2022-06-28

## 2022-06-28 RX ORDER — BLOOD SUGAR DIAGNOSTIC
STRIP MISCELLANEOUS
Qty: 100 EACH | Refills: 3 | Status: SHIPPED | OUTPATIENT
Start: 2022-06-28 | End: 2022-06-29 | Stop reason: SDUPTHER

## 2022-06-28 RX ORDER — BLOOD SUGAR DIAGNOSTIC
1 STRIP MISCELLANEOUS DAILY
Qty: 100 STRIP | Refills: 3 | OUTPATIENT
Start: 2022-06-28

## 2022-06-29 DIAGNOSIS — E08.65 DIABETES MELLITUS DUE TO UNDERLYING CONDITION, UNCONTROLLED, WITH HYPERGLYCEMIA (HCC): ICD-10-CM

## 2022-06-29 RX ORDER — BLOOD SUGAR DIAGNOSTIC
STRIP MISCELLANEOUS
Qty: 100 EACH | Refills: 3 | Status: SHIPPED | OUTPATIENT
Start: 2022-06-29

## 2022-07-06 DIAGNOSIS — E08.65 DIABETES MELLITUS DUE TO UNDERLYING CONDITION, UNCONTROLLED, WITH HYPERGLYCEMIA (HCC): ICD-10-CM

## 2022-07-06 RX ORDER — BLOOD SUGAR DIAGNOSTIC
1 STRIP MISCELLANEOUS DAILY
Qty: 100 STRIP | Refills: 3 | Status: SHIPPED | OUTPATIENT
Start: 2022-07-06 | End: 2022-07-06 | Stop reason: SDUPTHER

## 2022-07-06 RX ORDER — BLOOD SUGAR DIAGNOSTIC
1 STRIP MISCELLANEOUS DAILY
Qty: 100 STRIP | Refills: 3 | Status: SHIPPED | OUTPATIENT
Start: 2022-07-06

## 2022-07-15 DIAGNOSIS — E08.65 DIABETES MELLITUS DUE TO UNDERLYING CONDITION, UNCONTROLLED, WITH HYPERGLYCEMIA (HCC): ICD-10-CM

## 2022-07-15 RX ORDER — LANCETS 30 GAUGE
EACH MISCELLANEOUS
Qty: 100 EACH | Refills: 3 | Status: SHIPPED | OUTPATIENT
Start: 2022-07-15

## 2022-07-29 ENCOUNTER — CLINICAL SUPPORT (OUTPATIENT)
Dept: FAMILY MEDICINE CLINIC | Facility: CLINIC | Age: 62
End: 2022-07-29
Payer: COMMERCIAL

## 2022-07-29 DIAGNOSIS — Z20.822 EXPOSURE TO COVID-19 VIRUS: Primary | ICD-10-CM

## 2022-07-29 LAB
SARS-COV-2 AG UPPER RESP QL IA: NEGATIVE
VALID CONTROL: NORMAL

## 2022-07-29 PROCEDURE — 87811 SARS-COV-2 COVID19 W/OPTIC: CPT

## 2022-08-19 DIAGNOSIS — I10 ESSENTIAL HYPERTENSION: ICD-10-CM

## 2022-08-19 RX ORDER — AMLODIPINE BESYLATE 5 MG/1
TABLET ORAL
Qty: 90 TABLET | Refills: 1 | Status: SHIPPED | OUTPATIENT
Start: 2022-08-19

## 2022-09-02 ENCOUNTER — OFFICE VISIT (OUTPATIENT)
Dept: FAMILY MEDICINE CLINIC | Facility: CLINIC | Age: 62
End: 2022-09-02
Payer: COMMERCIAL

## 2022-09-02 VITALS
BODY MASS INDEX: 34.59 KG/M2 | TEMPERATURE: 97.7 F | RESPIRATION RATE: 16 BRPM | WEIGHT: 241.6 LBS | SYSTOLIC BLOOD PRESSURE: 124 MMHG | HEIGHT: 70 IN | HEART RATE: 78 BPM | DIASTOLIC BLOOD PRESSURE: 74 MMHG

## 2022-09-02 DIAGNOSIS — R25.3 MUSCLE TWITCHING: Primary | ICD-10-CM

## 2022-09-02 DIAGNOSIS — I10 ESSENTIAL HYPERTENSION: ICD-10-CM

## 2022-09-02 DIAGNOSIS — E11.9 TYPE 2 DIABETES MELLITUS WITHOUT COMPLICATION, WITHOUT LONG-TERM CURRENT USE OF INSULIN (HCC): ICD-10-CM

## 2022-09-02 LAB — SL AMB POCT HEMOGLOBIN AIC: 6.6 (ref ?–6.5)

## 2022-09-02 PROCEDURE — 3725F SCREEN DEPRESSION PERFORMED: CPT | Performed by: FAMILY MEDICINE

## 2022-09-02 PROCEDURE — 3044F HG A1C LEVEL LT 7.0%: CPT | Performed by: FAMILY MEDICINE

## 2022-09-02 PROCEDURE — 99214 OFFICE O/P EST MOD 30 MIN: CPT | Performed by: FAMILY MEDICINE

## 2022-09-02 PROCEDURE — 3078F DIAST BP <80 MM HG: CPT | Performed by: FAMILY MEDICINE

## 2022-09-02 PROCEDURE — 3074F SYST BP LT 130 MM HG: CPT | Performed by: FAMILY MEDICINE

## 2022-09-02 PROCEDURE — 83036 HEMOGLOBIN GLYCOSYLATED A1C: CPT | Performed by: FAMILY MEDICINE

## 2022-09-25 PROBLEM — R25.3 MUSCLE TWITCHING: Status: ACTIVE | Noted: 2022-09-25

## 2022-09-25 NOTE — ASSESSMENT & PLAN NOTE
Mainly pectoral mm--prob sec to strain  Inc fluids, monitor electrolytes  Heating pad/analgesic cream prn  otc oral  Analgesics prn

## 2022-09-25 NOTE — PROGRESS NOTES
Assessment/Plan:    1  Muscle twitching  Assessment & Plan:  Mainly pectoral mm--prob sec to strain  Inc fluids, monitor electrolytes  Heating pad/analgesic cream prn  otc oral  Analgesics prn      2  Type 2 diabetes mellitus without complication, without long-term current use of insulin (HCC)  -     POCT hemoglobin A1c    3  Essential hypertension  Assessment & Plan:  BP at goal--cont  current mgt      4  BMI 34 0-34 9,adult    BMI Counseling: Body mass index is 34 67 kg/m²  The BMI is above normal  Nutrition recommendations include encouraging healthy choices of fruits and vegetables, consuming healthier snacks, moderation in carbohydrate intake, increasing intake of lean protein, reducing intake of saturated and trans fat and reducing intake of cholesterol  Exercise recommendations include exercising 3-5 times per week and strength training exercises  No pharmacotherapy was ordered  Rationale for BMI follow-up plan is due to patient being overweight or obese  Depression Screening and Follow-up Plan: Patient was screened for depression during today's encounter  They screened negative with a PHQ-2 score of 0  Subjective:      Patient ID: Nila Oorpeza is a 63 y o  male      Chief Complaint   Patient presents with    Spasms     Started of and on a couple years on left side of chest he feels twitching in the mornings     Blood Pressure Check    Diabetes       HPI  In w wife, Todd Leigh  Interval hx reviewed  C/o intermittent muscle "twitching" left chest wall  No chest pressure, shortness of breath, diaphoresis, jaw/back pain or arm numbness  No rash or fever  ?sec to muscular strain/inc physical activity  DM controlled  w tfb5m=3 6%  BP in range    The following portions of the patient's history were reviewed and updated as appropriate: allergies, current medications, past family history, past medical history, past social history, past surgical history and problem list     Review of Systems Constitutional: Positive for fatigue  Negative for diaphoresis and fever  Respiratory: Negative for shortness of breath and wheezing  Cardiovascular: Negative for chest pain, palpitations and leg swelling  Gastrointestinal: Negative  Endocrine:        DM   Musculoskeletal: Positive for arthralgias and myalgias  Skin: Negative for rash  Allergic/Immunologic: Positive for environmental allergies  Neurological: Negative  Current Outpatient Medications   Medication Sig Dispense Refill    Alcohol Swabs (Alcohol Prep) 70 % PADS USE AS DIRECTED 100 each 2    amLODIPine (NORVASC) 5 mg tablet TAKE 1 TABLET (5 MG TOTAL) BY MOUTH DAILY 90 tablet 1    Blood Glucose Monitoring Suppl (ONE TOUCH ULTRA 2) w/Device KIT TEST 1 TO 2 TIMES A DAY AS DIRECTED      Blood Glucose Monitoring Suppl (OneTouch Verio Reflect) w/Device KIT Check blood sugars once daily  Please substitute with appropriate alternative as covered by patient's insurance  Dx: E11 65 1 kit 0    glucose blood (OneTouch Verio) test strip Check blood sugars once daily  Please substitute with appropriate alternative as covered by patient's insurance  Dx: E11 65 100 each 3    Lancets (OneTouch Delica Plus [id number]) MISC USE AS DIRECTED 100 each 3    OneTouch Ultra test strip Use 1 each daily Use as instructed Dx: E11 65 100 strip 3    tadalafil (CIALIS) 10 MG tablet TAKE 1 TABLET (10 MG TOTAL) BY MOUTH DAILY AS NEEDED FOR ERECTILE DYSFUNCTION 10 tablet 0    triamcinolone (KENALOG) 0 5 % cream APPLY TOPICALLY 2 (TWO) TIMES A DAY FOR 14 DAYS 30 g 0     No current facility-administered medications for this visit  Objective:    /74 (BP Location: Left arm, Patient Position: Sitting, Cuff Size: Large)   Pulse 78   Temp 97 7 °F (36 5 °C)   Resp 16   Ht 5' 10" (1 778 m)   Wt 110 kg (241 lb 9 6 oz)   BMI 34 67 kg/m²        Physical Exam  Vitals and nursing note reviewed     Constitutional:       General: He is not in acute distress  Comments: OW   Cardiovascular:      Rate and Rhythm: Normal rate and regular rhythm  Heart sounds:     No gallop  Pulmonary:      Effort: Pulmonary effort is normal       Breath sounds: Normal breath sounds  Chest:      Chest wall: Tenderness (pectoral muscles L>R, no crepitance) present  Musculoskeletal:      Cervical back: Neck supple  Skin:     General: Skin is warm and dry  Findings: No bruising, lesion or rash  Neurological:      General: No focal deficit present  Mental Status: He is alert and oriented to person, place, and time  Cranial Nerves: No cranial nerve deficit     Psychiatric:      Comments: anxious             Janet Parish MD

## 2022-09-25 NOTE — ASSESSMENT & PLAN NOTE
Lab Results   Component Value Date    HGBA1C 6 6 (A) 09/02/2022   controlled  Cont   ADA diet, increase exercise as tolerated  Schedule yearly eye exams  Daily foot care

## 2022-09-28 ENCOUNTER — TELEPHONE (OUTPATIENT)
Dept: FAMILY MEDICINE CLINIC | Facility: CLINIC | Age: 62
End: 2022-09-28

## 2022-09-28 DIAGNOSIS — M25.512 CHRONIC LEFT SHOULDER PAIN: Primary | ICD-10-CM

## 2022-09-28 DIAGNOSIS — G89.29 CHRONIC LEFT SHOULDER PAIN: Primary | ICD-10-CM

## 2022-10-12 PROBLEM — Z12.11 SCREEN FOR COLON CANCER: Status: RESOLVED | Noted: 2021-03-22 | Resolved: 2022-10-12

## 2022-10-14 ENCOUNTER — HOSPITAL ENCOUNTER (OUTPATIENT)
Dept: RADIOLOGY | Facility: HOSPITAL | Age: 62
Discharge: HOME | End: 2022-10-14
Payer: COMMERCIAL

## 2022-10-14 DIAGNOSIS — G89.29 CHRONIC LEFT SHOULDER PAIN: ICD-10-CM

## 2022-10-14 DIAGNOSIS — M25.512 CHRONIC LEFT SHOULDER PAIN: ICD-10-CM

## 2022-10-14 PROCEDURE — 73030 X-RAY EXAM OF SHOULDER: CPT

## 2022-12-12 ENCOUNTER — OFFICE VISIT (OUTPATIENT)
Dept: FAMILY MEDICINE CLINIC | Facility: CLINIC | Age: 62
End: 2022-12-12

## 2022-12-12 VITALS
BODY MASS INDEX: 34.13 KG/M2 | WEIGHT: 238.4 LBS | TEMPERATURE: 97.9 F | HEIGHT: 70 IN | DIASTOLIC BLOOD PRESSURE: 80 MMHG | HEART RATE: 68 BPM | RESPIRATION RATE: 14 BRPM | SYSTOLIC BLOOD PRESSURE: 136 MMHG

## 2022-12-12 DIAGNOSIS — I10 ESSENTIAL HYPERTENSION: ICD-10-CM

## 2022-12-12 DIAGNOSIS — R74.8 ELEVATED AMYLASE AND LIPASE: ICD-10-CM

## 2022-12-12 DIAGNOSIS — E11.9 TYPE 2 DIABETES MELLITUS WITHOUT COMPLICATION, WITHOUT LONG-TERM CURRENT USE OF INSULIN (HCC): Primary | ICD-10-CM

## 2022-12-12 DIAGNOSIS — Z12.5 PROSTATE CANCER SCREENING: ICD-10-CM

## 2022-12-12 DIAGNOSIS — R53.83 OTHER FATIGUE: ICD-10-CM

## 2022-12-12 DIAGNOSIS — E78.2 MIXED HYPERLIPIDEMIA: ICD-10-CM

## 2022-12-12 LAB
SL AMB  POCT GLUCOSE, UA: 50
SL AMB LEUKOCYTE ESTERASE,UA: ABNORMAL
SL AMB POCT BILIRUBIN,UA: ABNORMAL
SL AMB POCT BLOOD,UA: ABNORMAL
SL AMB POCT CLARITY,UA: CLEAR
SL AMB POCT COLOR,UA: YELLOW
SL AMB POCT HEMOGLOBIN AIC: 8.9 (ref ?–6.5)
SL AMB POCT KETONES,UA: ABNORMAL
SL AMB POCT NITRITE,UA: ABNORMAL
SL AMB POCT PH,UA: 5
SL AMB POCT SPECIFIC GRAVITY,UA: 1.02
SL AMB POCT URINE PROTEIN: ABNORMAL
SL AMB POCT UROBILINOGEN: ABNORMAL

## 2022-12-12 RX ORDER — METFORMIN HYDROCHLORIDE 500 MG/1
500 TABLET, EXTENDED RELEASE ORAL
Qty: 90 TABLET | Refills: 1 | Status: SHIPPED | OUTPATIENT
Start: 2022-12-12

## 2022-12-12 NOTE — PATIENT INSTRUCTIONS
Meal Planning with the Plate Method   AMBULATORY CARE:   Meal planning with the plate method  is a simple way for people with diabetes to plan meals  This method can help you eat the right amount of carbohydrates and keep your blood sugar levels under control  Carbohydrates naturally raise your blood sugar level  Your blood sugar level can rise too high if you eat too much carbohydrate at one time  Carbohydrates are found in starches (bread, cereal, starchy vegetables, and beans), fruit, milk, yogurt, and sweets  How to use the plate method to plan meals:   Draw an imaginary line down the middle of a 9-inch dinner plate  On one side, draw another line to divide that section in half  Your plate will have 3 sections  Fill the largest section of your plate with non-starchy vegetables  These include broccoli, spinach, cucumbers, peppers, cauliflower, and tomatoes  Add a starch to 1 of the small sections of your plate  Starches include pasta, rice, whole-grain bread, tortillas, corn, potatoes, and beans  Add meat or another source of protein to the other small section of your plate  Examples include chicken or turkey without skin, fish, lean beef or pork, low-fat cheese, tofu, or eggs  Add dairy or fruit to the side of your plate if your meal plan allows  Examples of dairy include skim or 1% milk or low-fat yogurt  If you do not drink milk, you may be able to add another serving of starchy food instead  Add a low-calorie or calorie-free drink such as water or unsweetened tea or coffee         Serving sizes of foods:   Non-starchy vegetables:      ½ cup of cooked vegetables or 1 cup of raw vegetables    ½ cup of vegetable juice    Starches:      1 ounce of whole-wheat bread or 1 small (6 inch) flour or corn tortilla    1 small (4 inch) pancake (about ¼ inch thick)    ¾ cup of dry, unsweetened, whole-grain ready-to-eat cereal or ¼ cup of low-fat granola    ½ cup of cooked cereal or oatmeal    ? cup of rice or pasta     ½ cup of corn, green peas, sweet potatoes, or mashed potatoes    ½ cup of cooked beans and peas (garbanzo, jorge, kidney, white, split, black-eyed)    Meat and other protein sources:      3 to 4 ounces of any lean meat, fish, or poultry    ½ cup of tofu or tempeh    1 large egg    1½ ounces (about 2 tablespoons) of nuts or 2 tablespoons of peanut butter    Fruit:      1 small piece of fresh fruit     ½ cup of canned or fresh fruit or unsweetened fruit juice    ¼ cup of dried fruit    Milk and yogurt:      1 cup (8 ounces) of skim or 1% milk    ¾ cup (6 ounces) of plain, non-fat yogurt    Other guidelines to follow:   Limit salt and sugar  Choose and prepare foods and drinks with less salt and added sugars  Use the nutrition information on food labels to help you make healthy choices  The percent daily value listed on the food label tells you whether a food is low or high in certain nutrients  A percent daily value of 5% or less means that the food is low in a nutrient  A percent daily value of 20% or more means that the food is high in a nutrient  Choose healthy fats  Choose healthy fats such as polyunsaturated and monounsaturated fats in place of unhealthy fats  Healthy fats are found in vegetable oils such as soybean, corn, canola, olive, and sunflower oil  Unhealthy fats are saturated fats, trans fats, and cholesterol  Unhealthy fats are found in shortening, butter, stick margarine, and animal fat  Ask your healthcare provider if alcohol is safe for you  and how much is safe for you  If you choose to drink alcohol, drink it with meals  When you drink alcohol on an empty stomach, your blood sugar may fall to a low level  © Copyright [employer] 2022 Information is for End User's use only and may not be sold, redistributed or otherwise used for commercial purposes   All illustrations and images included in CareNotes® are the copyrighted property of [employer]  or "[employer]  The above information is an  only  It is not intended as medical advice for individual conditions or treatments  Talk to your doctor, nurse or pharmacist before following any medical regimen to see if it is safe and effective for you  Type 2 Diabetes Management for Adults   AMBULATORY CARE:   Type 2 diabetes  is a disease that affects how your body uses glucose (sugar)  Either your body cannot make enough insulin, or it cannot use the insulin correctly  It is important to keep diabetes controlled to prevent damage to your heart, blood vessels, and other organs  Have someone call your local emergency number (911 in the [address] [facility]) if:   You cannot be woken  You have signs of diabetic ketoacidosis:     confusion, fatigue    vomiting    rapid heartbeat    fruity smelling breath    extreme thirst    dry mouth and skin    You have any of the following signs of a heart attack:      Squeezing, pressure, or pain in your chest    You may  also have any of the following:     Discomfort or pain in your back, neck, jaw, stomach, or arm    Shortness of breath    Nausea or vomiting    Lightheadedness or a sudden cold sweat    You have any of the following signs of a stroke:      Numbness or drooping on one side of your face     Weakness in an arm or leg    Confusion or difficulty speaking    Dizziness, a severe headache, or vision loss    Call your doctor or diabetes care team if:   You have a sore or wound that will not heal     You have a change in the amount you urinate  Your blood sugar levels are higher than your target goals  You often have lower blood sugar levels than your target goals  Your skin is red, dry, warm, or swollen  You have trouble coping with diabetes, or you feel anxious or depressed  You have questions or concerns about your condition or care  What you need to know about high blood sugar levels:  High blood sugar levels may not cause any symptoms   You may feel more thirsty or urinate more often than usual  Over time, high blood sugar levels can damage your nerves, blood vessels, tissues, and organs  The following can increase your blood sugar levels:  Large meals or large amounts of carbohydrates at one time    Less physical activity    Stress    Illness    A lower dose of medicine or insulin, or a late dose    What you need to know about low blood sugar levels: You can prevent symptoms such as shakiness, dizziness, irritability, or confusion by preventing your blood sugar levels from going too low  Treat a low blood sugar level right away  Drink 4 ounces of juice or have 1 tube of glucose gel  Check your blood sugar level again 10 to 15 minutes later  When the level goes back to normal, eat a meal or snack to prevent another decrease  Keep glucose gel, raisins, or hard candy with you at all times to treat a low blood sugar level  Your blood sugar level can get too low if you take diabetes medicine or insulin and do not eat enough food  If you use insulin, check your blood sugar level before you exercise  If your blood sugar level is below 100 mg/dL, eat 4 crackers or 2 ounces of raisins, or drink 4 ounces of juice  Check your level every 30 minutes if you exercise more than 1 hour  You may need a snack during or after exercise  What you can do to manage your blood sugar levels:   Check your blood sugar levels as directed and as needed  Several items are available to use to check your levels  You may need to check by testing a drop of blood in a glucose monitor  You may instead be given a continuous glucose monitoring (CGM) device  The device is worn at all times  The CGM checks your blood sugar level every 5 minutes  It sends results to an electronic device such as a smart phone  A CGM can be used with or without an insulin pump  Talk with your provider to find out which method is best for you   The goal for blood sugar levels before meals  is between 80 and 130 mg/dL and 2 hours after eating  is lower than 180 mg/dL  Make healthy food choices  Work with a dietitian to develop a meal plan that works for you and your schedule  A dietitian can help you learn how to eat the right amount of carbohydrates during your meals and snacks  Carbohydrates can raise your blood sugar level if you eat too many at one time  Examples of foods that contain carbohydrates are breads, cereals, rice, pasta, and sweets  Get regular physical activity  Physical activity can help you get to your target blood sugar level goal and manage your weight  Get at least 150 minutes of moderate to vigorous aerobic physical activity each week  Do not miss more than 2 days in a row  Do not sit longer than 30 minutes at a time  Your healthcare provider can help you create an activity plan  The plan can include the best activities for you and can help you build your strength and endurance  Maintain a healthy weight  Ask your healthcare provider what a healthy weight is for you  Ask him or her to help you create a safe weight loss plan if you are overweight  Take your diabetes medicine or insulin as directed  You may need diabetes medicine, insulin, or both to help control your blood sugar levels  Your healthcare provider will teach you how and when to take your diabetes medicine or insulin  You will also be taught about side effects oral diabetes medicine can cause  Insulin may be injected, or given through a pump or pen  You and your care team will discuss which method is best for you  An insulin pump  is an implanted device that gives your insulin 24 hours a day  An insulin pump prevents the need for multiple insulin injections in a day  An insulin pen  is a device prefilled with the right amount of insulin  You and your family members will be taught how to draw up and give insulin  if this is the best method for you  Your education team will also teach you how to dispose of needles and syringes  You will learn how much insulin you need  and when to give it  You will be taught when not to give insulin  You will also be taught what to do if your blood sugar level drops too low  This may happen if you take insulin and do not eat the right amount of carbohydrates  Other things you can do to manage type 2 diabetes:   Wear medical alert identification  Wear medical alert jewelry or carry a card that says you have diabetes  Ask your provider where to get these items  Do not smoke  Nicotine and other chemicals in cigarettes and cigars can cause lung and blood vessel damage  It also makes it more difficult to manage your diabetes  Ask your provider for information if you currently smoke and need help to quit  Do not use e-cigarettes or smokeless tobacco in place of cigarettes or to help you quit  They still contain nicotine  Check your feet each day for cuts, scratches, calluses, or other wounds  Look for redness and swelling, and feel for warmth  Wear shoes that fit well  Check your shoes for rocks or other objects that can hurt your feet  Do not walk barefoot or wear shoes without socks  Wear cotton socks to help keep your feet dry  Ask about vaccines you may need  You have a higher risk for serious illness if you get the flu, pneumonia, COVID-19, or hepatitis  Ask your provider if you should get vaccines to prevent these or other diseases, and when to get the vaccines  Talk to your care team if you become stressed about diabetes care  Sometimes being able to fit diabetes care into your life can cause increased stress  The stress can cause you not to take care of yourself properly  Your care team can help by offering tips about self-care  Your care team may suggest you talk to a mental health provider  The provider can listen and offer help with self-care issues      Follow up with your doctor or diabetes care team as directed: You may need to have blood tests done before your follow-up visit  The test results will show if changes need to be made in your treatment or self-care  Write down your questions so you remember to ask them during your visits  Talk to your provider if you cannot afford your medicine  © Copyright [employer] 2022 Information is for End User's use only and may not be sold, redistributed or otherwise used for commercial purposes  All illustrations and images included in CareNotes® are the copyrighted property of [employer]  or [facility] Kameron Lombardo   The above information is an  only  It is not intended as medical advice for individual conditions or treatments  Talk to your doctor, nurse or pharmacist before following any medical regimen to see if it is safe and effective for you  Basic Carbohydrate Counting   AMBULATORY CARE:   Carbohydrate counting  is a way to plan your meals by counting the amount of carbohydrate in foods  Carbohydrates are the sugars, starches, and fiber found in fruit, grains, vegetables, and milk products  Carbohydrates increase your blood sugar levels  Carbohydrate counting can help you eat the right amount of carbohydrate to keep your blood sugar levels under control  What you need to know about planning meals using carbohydrate counting:  A dietitian or healthcare provider will help you develop a healthy meal plan that works best for you  You will be taught how much carbohydrate to eat or drink for each meal and snack  Your meal plan will be based on your age, weight, usual food intake, and physical activity level  If you have diabetes, it will also include your blood sugar levels and diabetes medicine  Once you know how much carbohydrate you should eat, you can decide what type of food you want to eat  You will need to know what foods contain carbohydrate and how much they contain   Keep track of the amount of carbohydrate in meals and snacks in order to follow your meal plan  Do not avoid carbohydrates or skip meals  Your blood sugar may fall too low if you do not eat enough carbohydrate or you skip meals  Foods that contain carbohydrate:   Breads:  Each serving of food listed below contains about 15 g of carbohydrate   1 slice of bread (1 ounce) or 1 flour or corn tortilla (6 inch)    ½ of a hamburger bun or ¼ of a large bagel (about 1 ounce)    1 pancake (about 4 inches across and ¼ inch thick)    Cereals and grains:  Serving sizes of ready-to-eat cereals vary  Look at the serving size and the total carbohydrate amount listed on the food label  Each serving of food listed below contains about 15 g of carbohydrate   ¾ cup of dry, unsweetened, ready-to-eat cereal or ¼ cup of low-fat granola     ½ cup of oatmeal or other cooked cereal     ? cup of cooked rice or pasta    Starchy vegetables and beans:  Each serving of food listed below contains about 15 g of carbohydrate   ½ cup of corn, green peas, sweet potatoes, or mashed potatoes    ¼ of a large baked potato    ½ cup of beans, lentils, and peas (garbanzo, jorge, kidney, white, split, black-eyed)    Crackers and snacks:  Each serving of food listed below contains about 15 g of carbohydrate   3 jayesh cracker squares or 8 animal crackers     6 saltine-type crackers    3 cups of popcorn or ¾ ounce of pretzels, potato chips, or tortilla chips    Fruit:  Each serving of food listed below contains about 15 g of carbohydrate   1 small (4 ounce) piece of fresh fruit or ¾ to 1 cup of fresh fruit    ½ cup of canned or frozen fruit, packed in natural juice    ½ cup (4 ounces) of unsweetened fruit juice    2 tablespoons of dried fruit    Desserts or sugary foods:  Each serving of food listed below contains about 15 g of carbohydrate       2-inch square unfrosted cake or brownie     2 small cookies    ½ cup of ice cream, frozen yogurt, or nondairy frozen yogurt    ¼ cup of sherbet or sorbet    1 tablespoon of regular syrup, jam, or jelly    2 tablespoons of light syrup    Milk and yogurt:  Foods from the milk group contain about 12 g of carbohydrate per serving  1 cup of fat-free or low-fat milk    1 cup of soy milk    ? cup of fat-free, yogurt sweetened with artificial sweetener    Non-starchy vegetables:  Each serving contains about 5 g of carbohydrate   Three servings of non-starch vegetables count as 1 carbohydrate serving  ½ cup of cooked vegetables or 1 cup of raw vegetables  This includes beets, broccoli, cabbage, cauliflower, cucumber, mushrooms, tomatoes, and zucchini    ½ cup of vegetable juice    How to use carbohydrate counting to plan meals:   Count carbohydrate amounts using serving sizes:      Pasta dinner example: You plan to have pasta, tossed salad, and an 8-ounce glass of milk  Your healthcare provider tells you that you may have 4 carbohydrate servings for dinner  One carbohydrate serving of pasta is ? cup  One cup of pasta will equal 3 carbohydrate servings  An 8-ounce glass of milk will count as 1 carbohydrate serving  These amounts of food would equal 4 carbohydrate servings  One cup of tossed salad does not count toward your carbohydrate servings  Count carbohydrate amounts using food labels:  Find the total amount of carbohydrate in a packaged food by reading the food label  Food labels tell you the serving size of the food and the total carbohydrate amount in each serving  Find the serving size on the food label and then decide how many servings you will eat  Multiply the number of servings you plan to eat by the carbohydrate amount per serving  Granola bar snack example: Your meal plan allows you to have 2 carbohydrate servings (30 grams) of carbohydrate for a snack  You plan to eat 1 package of granola bars, which contains 2 bars  According to the food label, the serving size of food in this package is 1 bar   Each serving (1 bar) contains 25 grams of carbohydrate  The total amount of carbohydrate in this package of granola bars would be 50 g  Based on your meal plan, you should eat only 1 bar  Follow up with your doctor as directed:  Write down your questions so you remember to ask them during your visits  © Copyright [employer] 2022 Information is for End User's use only and may not be sold, redistributed or otherwise used for commercial purposes  All illustrations and images included in CareNotes® are the copyrighted property of [employer]  or Nanette Lombardo   The above information is an  only  It is not intended as medical advice for individual conditions or treatments  Talk to your doctor, nurse or pharmacist before following any medical regimen to see if it is safe and effective for you  Foot Care for People with Diabetes   AMBULATORY CARE:   What you need to know about foot care: Foot care helps protect your feet and prevent foot ulcers or sores  Long-term high blood sugar levels can damage the blood vessels and nerves in your legs and feet  This damage makes it hard to feel pressure, pain, temperature, and touch  You may not be able to feel a cut or sore, or shoes that are too tight  Foot care is needed to prevent serious problems, such as an infection or amputation  Diabetes may cause your toes to become crooked or curved under  These changes may affect the way you walk and can lead to increased pressure on your foot  The pressure can decrease blood flow to your feet  Lack of blood flow increases your risk for a foot ulcer  Do not ignore small problems, such as dry skin or small wounds  These can become life-threatening over time without proper care  Call your care team provider if:   Your feet become numb, weak, or hard to move  You have pus draining from a sore on your foot       You have a wound on your foot that gets bigger, deeper, or does not heal      You see blisters, cuts, scratches, calluses, or sores on your foot  You have a fever, and your feet become red, warm, and swollen  Your toenails become thick, curled, or yellow  You find it hard to check your feet because your vision is poor  You have questions or concerns about your condition or care  How to care for your feet:   Check your feet each day  Look at your whole foot, including the bottom, and between and under your toes  Check for wounds, corns, and calluses  Use a mirror to see the bottom of your feet  The skin on your feet may be shiny, tight, or darker than normal  Your feet may also be cold and pale  Feel your feet by running your hands along the tops, bottoms, sides, and between your toes  Redness, swelling, and warmth are signs of blood flow problems that can lead to a foot ulcer  Do not try to remove corns or calluses yourself  Wash your feet each day with soap and warm water  Do not use hot water, because this can injure your foot  Dry your feet gently with a towel after you wash them  Dry between and under your toes  Apply lotion or a moisturizer on your dry feet  Ask your care team provider what lotions are best to use  Do not put lotion or moisturizer between your toes  Moisture between your toes could lead to skin breakdown  Cut your toenails correctly  File or cut your toenails straight across  Use a soft brush to clean around your toenails  If your toenails are very thick, you may need to have a care team provider or specialist cut them  Protect your feet  Do not walk barefoot or wear your shoes without socks  Check your shoes for rocks or other objects that can hurt your feet  Wear cotton socks to help keep your feet dry  Wear socks without toe seams, or wear them with the seams inside out  Change your socks each day  Do not wear socks that are dirty or damp  Wear shoes that fit well  Wear shoes that do not rub against any area of your feet   Your shoes should be ½ to ¾ inch (1 to 2 centimeters) longer than your feet  Your shoes should also have extra space around the widest part of your feet  Walking or athletic shoes with laces or straps that adjust are best  Ask your care team provider for help to choose shoes that fit you best  Ask him or her if you need to wear an insert, orthotic, or bandage on your feet  Go to your follow-up visits  Your care team provider will do a foot exam at least once a year  You may need a foot exam more often if you have nerve damage, foot deformities, or ulcers  He will check for nerve damage and how well you can feel your feet  He will check your shoes to see if they fit well  Do not smoke  Smoking can damage your blood vessels and put you at increased risk for foot ulcers  Ask your care team provider for information if you currently smoke and need help to quit  E-cigarettes or smokeless tobacco still contain nicotine  Talk to your care team provider before you use these products  Follow up with your diabetes care team provider or foot specialist as directed: You will need to have your feet checked at least once a year  You may need a foot exam more often if you have nerve damage, foot deformities, or ulcers  Write down your questions so you remember to ask them during your visits  © Copyright [employer] 2022 Information is for End User's use only and may not be sold, redistributed or otherwise used for commercial purposes  All illustrations and images included in CareNotes® are the copyrighted property of [employer]  or Nnaette Kilpatrick  The above information is an  only  It is not intended as medical advice for individual conditions or treatments  Talk to your doctor, nurse or pharmacist before following any medical regimen to see if it is safe and effective for you

## 2022-12-12 NOTE — PROGRESS NOTES
Assessment/Plan:    1  Type 2 diabetes mellitus without complication, without long-term current use of insulin (HCC)  -     POCT urine dip auto non-scope  -     Microalbumin / creatinine urine ratio  -     POCT hemoglobin A1c  -     Comprehensive metabolic panel; Future  -     TSH, 3rd generation; Future  -     Amylase; Future  -     Lipase; Future  -     Comprehensive metabolic panel  -     TSH, 3rd generation  -     Amylase  -     Lipase  -     metFORMIN (GLUCOPHAGE-XR) 500 mg 24 hr tablet; Take 1 tablet (500 mg total) by mouth daily with dinner    2  Essential hypertension  -     Comprehensive metabolic panel; Future  -     Magnesium; Future  -     Comprehensive metabolic panel  -     Magnesium    3  Mixed hyperlipidemia  -     Lipid panel; Future  -     TSH, 3rd generation; Future  -     Lipid panel  -     TSH, 3rd generation    4  Elevated amylase and lipase  -     Amylase; Future  -     Lipase; Future  -     Amylase  -     Lipase    5  Other fatigue  -     CBC and Platelet; Future  -     CBC and Platelet    6  BMI 34 0-34 9,adult  -     Comprehensive metabolic panel; Future  -     Lipid panel; Future  -     TSH, 3rd generation; Future  -     Amylase; Future  -     Lipase; Future  -     Comprehensive metabolic panel  -     Lipid panel  -     TSH, 3rd generation  -     Amylase  -     Lipase    7  Prostate cancer screening  -     PSA, Total Screen; Future      BMI Counseling: Body mass index is 34 21 kg/m²  The BMI is above normal  Nutrition recommendations include encouraging healthy choices of fruits and vegetables, consuming healthier snacks, moderation in carbohydrate intake, increasing intake of lean protein, reducing intake of saturated and trans fat and reducing intake of cholesterol  Exercise recommendations include exercising 3-5 times per week and strength training exercises  No pharmacotherapy was ordered  Rationale for BMI follow-up plan is due to patient being overweight or obese            Patient Instructions     Meal Planning with the Plate Method   AMBULATORY CARE:   Meal planning with the plate method  is a simple way for people with diabetes to plan meals  This method can help you eat the right amount of carbohydrates and keep your blood sugar levels under control  Carbohydrates naturally raise your blood sugar level  Your blood sugar level can rise too high if you eat too much carbohydrate at one time  Carbohydrates are found in starches (bread, cereal, starchy vegetables, and beans), fruit, milk, yogurt, and sweets  How to use the plate method to plan meals:   · Draw an imaginary line down the middle of a 9-inch dinner plate  On one side, draw another line to divide that section in half  Your plate will have 3 sections  · Fill the largest section of your plate with non-starchy vegetables  These include broccoli, spinach, cucumbers, peppers, cauliflower, and tomatoes  · Add a starch to 1 of the small sections of your plate  Starches include pasta, rice, whole-grain bread, tortillas, corn, potatoes, and beans  · Add meat or another source of protein to the other small section of your plate  Examples include chicken or turkey without skin, fish, lean beef or pork, low-fat cheese, tofu, or eggs  · Add dairy or fruit to the side of your plate if your meal plan allows  Examples of dairy include skim or 1% milk or low-fat yogurt  If you do not drink milk, you may be able to add another serving of starchy food instead  · Add a low-calorie or calorie-free drink such as water or unsweetened tea or coffee  Serving sizes of foods:   1  Non-starchy vegetables:      ? ½ cup of cooked vegetables or 1 cup of raw vegetables    ? ½ cup of vegetable juice    2  Starches:      ? 1 ounce of whole-wheat bread or 1 small (6 inch) flour or corn tortilla    ? 1 small (4 inch) pancake (about ¼ inch thick)    ? ¾ cup of dry, unsweetened, whole-grain ready-to-eat cereal or ¼ cup of low-fat granola    ?  ½ cup of cooked cereal or oatmeal    ? ? cup of rice or pasta     ? ½ cup of corn, green peas, sweet potatoes, or mashed potatoes    ? ½ cup of cooked beans and peas (garbanzo, jorge, kidney, white, split, black-eyed)    3  Meat and other protein sources:      ? 3 to 4 ounces of any lean meat, fish, or poultry    ? ½ cup of tofu or tempeh    ? 1 large egg    ? 1½ ounces (about 2 tablespoons) of nuts or 2 tablespoons of peanut butter    4  Fruit:      ? 1 small piece of fresh fruit     ? ½ cup of canned or fresh fruit or unsweetened fruit juice    ? ¼ cup of dried fruit    5  Milk and yogurt:      ? 1 cup (8 ounces) of skim or 1% milk    ? ¾ cup (6 ounces) of plain, non-fat yogurt    Other guidelines to follow:   · Limit salt and sugar  Choose and prepare foods and drinks with less salt and added sugars  Use the nutrition information on food labels to help you make healthy choices  The percent daily value listed on the food label tells you whether a food is low or high in certain nutrients  A percent daily value of 5% or less means that the food is low in a nutrient  A percent daily value of 20% or more means that the food is high in a nutrient  · Choose healthy fats  Choose healthy fats such as polyunsaturated and monounsaturated fats in place of unhealthy fats  Healthy fats are found in vegetable oils such as soybean, corn, canola, olive, and sunflower oil  Unhealthy fats are saturated fats, trans fats, and cholesterol  Unhealthy fats are found in shortening, butter, stick margarine, and animal fat  · Ask your healthcare provider if alcohol is safe for you  and how much is safe for you  If you choose to drink alcohol, drink it with meals  When you drink alcohol on an empty stomach, your blood sugar may fall to a low level  © Copyright [employer] 2022 Information is for End User's use only and may not be sold, redistributed or otherwise used for commercial purposes   All illustrations and images included in [employer] are the copyrighted property of [employer]  or [facility] Kameron Lombardo   The above information is an  only  It is not intended as medical advice for individual conditions or treatments  Talk to your doctor, nurse or pharmacist before following any medical regimen to see if it is safe and effective for you  Type 2 Diabetes Management for Adults   AMBULATORY CARE:   Type 2 diabetes  is a disease that affects how your body uses glucose (sugar)  Either your body cannot make enough insulin, or it cannot use the insulin correctly  It is important to keep diabetes controlled to prevent damage to your heart, blood vessels, and other organs  Have someone call your local emergency number (911 in the [address] [facility]) if:   1  You cannot be woken  2  You have signs of diabetic ketoacidosis:     ? confusion, fatigue    ? vomiting    ? rapid heartbeat    ? fruity smelling breath    ? extreme thirst    ? dry mouth and skin    3  You have any of the following signs of a heart attack:      ? Squeezing, pressure, or pain in your chest    ? You may  also have any of the following:     ? Discomfort or pain in your back, neck, jaw, stomach, or arm    ? Shortness of breath    ? Nausea or vomiting    ? Lightheadedness or a sudden cold sweat    4  You have any of the following signs of a stroke:      ? Numbness or drooping on one side of your face     ? Weakness in an arm or leg    ? Confusion or difficulty speaking    ? Dizziness, a severe headache, or vision loss    Call your doctor or diabetes care team if:   · You have a sore or wound that will not heal     · You have a change in the amount you urinate  · Your blood sugar levels are higher than your target goals  · You often have lower blood sugar levels than your target goals  · Your skin is red, dry, warm, or swollen  · You have trouble coping with diabetes, or you feel anxious or depressed      · You have questions or concerns about your condition or care  What you need to know about high blood sugar levels:  High blood sugar levels may not cause any symptoms  You may feel more thirsty or urinate more often than usual  Over time, high blood sugar levels can damage your nerves, blood vessels, tissues, and organs  The following can increase your blood sugar levels:  · Large meals or large amounts of carbohydrates at one time    · Less physical activity    · Stress    · Illness    · A lower dose of medicine or insulin, or a late dose    What you need to know about low blood sugar levels: You can prevent symptoms such as shakiness, dizziness, irritability, or confusion by preventing your blood sugar levels from going too low  1  Treat a low blood sugar level right away  ? Drink 4 ounces of juice or have 1 tube of glucose gel  ? Check your blood sugar level again 10 to 15 minutes later  ? When the level goes back to normal, eat a meal or snack to prevent another decrease  2  Keep glucose gel, raisins, or hard candy with you at all times to treat a low blood sugar level  3  Your blood sugar level can get too low if you take diabetes medicine or insulin and do not eat enough food  4  If you use insulin, check your blood sugar level before you exercise  ? If your blood sugar level is below 100 mg/dL, eat 4 crackers or 2 ounces of raisins, or drink 4 ounces of juice  ? Check your level every 30 minutes if you exercise more than 1 hour  ? You may need a snack during or after exercise  What you can do to manage your blood sugar levels:   1  Check your blood sugar levels as directed and as needed  Several items are available to use to check your levels  You may need to check by testing a drop of blood in a glucose monitor  You may instead be given a continuous glucose monitoring (CGM) device  The device is worn at all times  The CGM checks your blood sugar level every 5 minutes   It sends results to an electronic device such as a smart phone  A CGM can be used with or without an insulin pump  Talk with your provider to find out which method is best for you  The goal for blood sugar levels before meals  is between 80 and 130 mg/dL and 2 hours after eating  is lower than 180 mg/dL  2  Make healthy food choices  Work with a dietitian to develop a meal plan that works for you and your schedule  A dietitian can help you learn how to eat the right amount of carbohydrates during your meals and snacks  Carbohydrates can raise your blood sugar level if you eat too many at one time  Examples of foods that contain carbohydrates are breads, cereals, rice, pasta, and sweets  3  Get regular physical activity  Physical activity can help you get to your target blood sugar level goal and manage your weight  Get at least 150 minutes of moderate to vigorous aerobic physical activity each week  Do not miss more than 2 days in a row  Do not sit longer than 30 minutes at a time  Your healthcare provider can help you create an activity plan  The plan can include the best activities for you and can help you build your strength and endurance  4  Maintain a healthy weight  Ask your healthcare provider what a healthy weight is for you  Ask him or her to help you create a safe weight loss plan if you are overweight  5  Take your diabetes medicine or insulin as directed  You may need diabetes medicine, insulin, or both to help control your blood sugar levels  Your healthcare provider will teach you how and when to take your diabetes medicine or insulin  You will also be taught about side effects oral diabetes medicine can cause  Insulin may be injected, or given through a pump or pen  You and your care team will discuss which method is best for you  ? An insulin pump  is an implanted device that gives your insulin 24 hours a day  An insulin pump prevents the need for multiple insulin injections in a day  ? An insulin pen  is a device prefilled with the right amount of insulin  ? You and your family members will be taught how to draw up and give insulin  if this is the best method for you  Your education team will also teach you how to dispose of needles and syringes  ? You will learn how much insulin you need  and when to give it  You will be taught when not to give insulin  You will also be taught what to do if your blood sugar level drops too low  This may happen if you take insulin and do not eat the right amount of carbohydrates  Other things you can do to manage type 2 diabetes:   · Wear medical alert identification  Wear medical alert jewelry or carry a card that says you have diabetes  Ask your provider where to get these items  · Do not smoke  Nicotine and other chemicals in cigarettes and cigars can cause lung and blood vessel damage  It also makes it more difficult to manage your diabetes  Ask your provider for information if you currently smoke and need help to quit  Do not use e-cigarettes or smokeless tobacco in place of cigarettes or to help you quit  They still contain nicotine  · Check your feet each day for cuts, scratches, calluses, or other wounds  Look for redness and swelling, and feel for warmth  Wear shoes that fit well  Check your shoes for rocks or other objects that can hurt your feet  Do not walk barefoot or wear shoes without socks  Wear cotton socks to help keep your feet dry  · Ask about vaccines you may need  You have a higher risk for serious illness if you get the flu, pneumonia, COVID-19, or hepatitis  Ask your provider if you should get vaccines to prevent these or other diseases, and when to get the vaccines  · Talk to your care team if you become stressed about diabetes care  Sometimes being able to fit diabetes care into your life can cause increased stress  The stress can cause you not to take care of yourself properly   Your care team can help by offering tips about self-care  Your care team may suggest you talk to a mental health provider  The provider can listen and offer help with self-care issues  Follow up with your doctor or diabetes care team as directed: You may need to have blood tests done before your follow-up visit  The test results will show if changes need to be made in your treatment or self-care  Write down your questions so you remember to ask them during your visits  Talk to your provider if you cannot afford your medicine  © Copyright [employer] 2022 Information is for End User's use only and may not be sold, redistributed or otherwise used for commercial purposes  All illustrations and images included in CareNotes® are the copyrighted property of A D A M , Inc  or [facility] Kameron Lombardo   The above information is an  only  It is not intended as medical advice for individual conditions or treatments  Talk to your doctor, nurse or pharmacist before following any medical regimen to see if it is safe and effective for you  Basic Carbohydrate Counting   AMBULATORY CARE:   Carbohydrate counting  is a way to plan your meals by counting the amount of carbohydrate in foods  Carbohydrates are the sugars, starches, and fiber found in fruit, grains, vegetables, and milk products  Carbohydrates increase your blood sugar levels  Carbohydrate counting can help you eat the right amount of carbohydrate to keep your blood sugar levels under control  What you need to know about planning meals using carbohydrate counting:  · A dietitian or healthcare provider will help you develop a healthy meal plan that works best for you  You will be taught how much carbohydrate to eat or drink for each meal and snack  Your meal plan will be based on your age, weight, usual food intake, and physical activity level  If you have diabetes, it will also include your blood sugar levels and diabetes medicine   Once you know how much carbohydrate you should eat, you can decide what type of food you want to eat  · You will need to know what foods contain carbohydrate and how much they contain  Keep track of the amount of carbohydrate in meals and snacks in order to follow your meal plan  Do not avoid carbohydrates or skip meals  Your blood sugar may fall too low if you do not eat enough carbohydrate or you skip meals  Foods that contain carbohydrate:   1  Breads:  Each serving of food listed below contains about 15 g of carbohydrate   ? 1 slice of bread (1 ounce) or 1 flour or corn tortilla (6 inch)    ? ½ of a hamburger bun or ¼ of a large bagel (about 1 ounce)    ? 1 pancake (about 4 inches across and ¼ inch thick)    2  Cereals and grains:  Serving sizes of ready-to-eat cereals vary  Look at the serving size and the total carbohydrate amount listed on the food label  Each serving of food listed below contains about 15 g of carbohydrate   ? ¾ cup of dry, unsweetened, ready-to-eat cereal or ¼ cup of low-fat granola     ? ½ cup of oatmeal or other cooked cereal     ? ? cup of cooked rice or pasta    3  Starchy vegetables and beans:  Each serving of food listed below contains about 15 g of carbohydrate   ? ½ cup of corn, green peas, sweet potatoes, or mashed potatoes    ? ¼ of a large baked potato    ? ½ cup of beans, lentils, and peas (garbanzo, jorge, kidney, white, split, black-eyed)    4  Crackers and snacks:  Each serving of food listed below contains about 15 g of carbohydrate   ? 3 jayesh cracker squares or 8 animal crackers     ? 6 saltine-type crackers    ? 3 cups of popcorn or ¾ ounce of pretzels, potato chips, or tortilla chips    5  Fruit:  Each serving of food listed below contains about 15 g of carbohydrate   ? 1 small (4 ounce) piece of fresh fruit or ¾ to 1 cup of fresh fruit    ? ½ cup of canned or frozen fruit, packed in natural juice    ? ½ cup (4 ounces) of unsweetened fruit juice    ?  2 tablespoons of dried fruit    6  Desserts or sugary foods:  Each serving of food listed below contains about 15 g of carbohydrate   ? 2-inch square unfrosted cake or brownie     ? 2 small cookies    ? ½ cup of ice cream, frozen yogurt, or nondairy frozen yogurt    ? ¼ cup of sherbet or sorbet    ? 1 tablespoon of regular syrup, jam, or jelly    ? 2 tablespoons of light syrup    7  Milk and yogurt:  Foods from the milk group contain about 12 g of carbohydrate per serving  ? 1 cup of fat-free or low-fat milk    ? 1 cup of soy milk    ? ? cup of fat-free, yogurt sweetened with artificial sweetener    8  Non-starchy vegetables:  Each serving contains about 5 g of carbohydrate   Three servings of non-starch vegetables count as 1 carbohydrate serving  ? ½ cup of cooked vegetables or 1 cup of raw vegetables  This includes beets, broccoli, cabbage, cauliflower, cucumber, mushrooms, tomatoes, and zucchini    ? ½ cup of vegetable juice    How to use carbohydrate counting to plan meals:   1  Count carbohydrate amounts using serving sizes:      ? Pasta dinner example: You plan to have pasta, tossed salad, and an 8-ounce glass of milk  Your healthcare provider tells you that you may have 4 carbohydrate servings for dinner  One carbohydrate serving of pasta is ? cup  One cup of pasta will equal 3 carbohydrate servings  An 8-ounce glass of milk will count as 1 carbohydrate serving  These amounts of food would equal 4 carbohydrate servings  One cup of tossed salad does not count toward your carbohydrate servings  2  Count carbohydrate amounts using food labels:  Find the total amount of carbohydrate in a packaged food by reading the food label  Food labels tell you the serving size of the food and the total carbohydrate amount in each serving  Find the serving size on the food label and then decide how many servings you will eat  Multiply the number of servings you plan to eat by the carbohydrate amount per serving       ? Lisa bar snack example: Your meal plan allows you to have 2 carbohydrate servings (30 grams) of carbohydrate for a snack  You plan to eat 1 package of granola bars, which contains 2 bars  According to the food label, the serving size of food in this package is 1 bar  Each serving (1 bar) contains 25 grams of carbohydrate  The total amount of carbohydrate in this package of granola bars would be 50 g  Based on your meal plan, you should eat only 1 bar  Follow up with your doctor as directed:  Write down your questions so you remember to ask them during your visits  © Copyright [employer] 2022 Information is for End User's use only and may not be sold, redistributed or otherwise used for commercial purposes  All illustrations and images included in CareNotes® are the copyrighted property of A [employer] A M [employer] Inc  or Nanette Kilpatrick  The above information is an  only  It is not intended as medical advice for individual conditions or treatments  Talk to your doctor, nurse or pharmacist before following any medical regimen to see if it is safe and effective for you  Foot Care for People with Diabetes   AMBULATORY CARE:   What you need to know about foot care:   · Foot care helps protect your feet and prevent foot ulcers or sores  Long-term high blood sugar levels can damage the blood vessels and nerves in your legs and feet  This damage makes it hard to feel pressure, pain, temperature, and touch  You may not be able to feel a cut or sore, or shoes that are too tight  Foot care is needed to prevent serious problems, such as an infection or amputation  · Diabetes may cause your toes to become crooked or curved under  These changes may affect the way you walk and can lead to increased pressure on your foot  The pressure can decrease blood flow to your feet  Lack of blood flow increases your risk for a foot ulcer  Do not ignore small problems, such as dry skin or small wounds   These can become life-threatening over time without proper care  Call your care team provider if:   · Your feet become numb, weak, or hard to move  · You have pus draining from a sore on your foot  · You have a wound on your foot that gets bigger, deeper, or does not heal      · You see blisters, cuts, scratches, calluses, or sores on your foot  · You have a fever, and your feet become red, warm, and swollen  · Your toenails become thick, curled, or yellow  · You find it hard to check your feet because your vision is poor  · You have questions or concerns about your condition or care  How to care for your feet:   · Check your feet each day  Look at your whole foot, including the bottom, and between and under your toes  Check for wounds, corns, and calluses  Use a mirror to see the bottom of your feet  The skin on your feet may be shiny, tight, or darker than normal  Your feet may also be cold and pale  Feel your feet by running your hands along the tops, bottoms, sides, and between your toes  Redness, swelling, and warmth are signs of blood flow problems that can lead to a foot ulcer  Do not try to remove corns or calluses yourself  · Wash your feet each day with soap and warm water  Do not use hot water, because this can injure your foot  Dry your feet gently with a towel after you wash them  Dry between and under your toes  · Apply lotion or a moisturizer on your dry feet  Ask your care team provider what lotions are best to use  Do not put lotion or moisturizer between your toes  Moisture between your toes could lead to skin breakdown  · Cut your toenails correctly  File or cut your toenails straight across  Use a soft brush to clean around your toenails  If your toenails are very thick, you may need to have a care team provider or specialist cut them  · Protect your feet  Do not walk barefoot or wear your shoes without socks   Check your shoes for rocks or other objects that can hurt your feet  Wear cotton socks to help keep your feet dry  Wear socks without toe seams, or wear them with the seams inside out  Change your socks each day  Do not wear socks that are dirty or damp  · Wear shoes that fit well  Wear shoes that do not rub against any area of your feet  Your shoes should be ½ to ¾ inch (1 to 2 centimeters) longer than your feet  Your shoes should also have extra space around the widest part of your feet  Walking or athletic shoes with laces or straps that adjust are best  Ask your care team provider for help to choose shoes that fit you best  Ask him or her if you need to wear an insert, orthotic, or bandage on your feet  · Go to your follow-up visits  Your care team provider will do a foot exam at least once a year  You may need a foot exam more often if you have nerve damage, foot deformities, or ulcers  He will check for nerve damage and how well you can feel your feet  He will check your shoes to see if they fit well  · Do not smoke  Smoking can damage your blood vessels and put you at increased risk for foot ulcers  Ask your care team provider for information if you currently smoke and need help to quit  E-cigarettes or smokeless tobacco still contain nicotine  Talk to your care team provider before you use these products  Follow up with your diabetes care team provider or foot specialist as directed: You will need to have your feet checked at least once a year  You may need a foot exam more often if you have nerve damage, foot deformities, or ulcers  Write down your questions so you remember to ask them during your visits  © [employer] 2022 Information is for End User's use only and may not be sold, redistributed or otherwise used for commercial purposes  All illustrations and images included in CareNotes® are the copyrighted property of [email or web address]  or Nanette Kilpatrick  The above information is an  only   It is not intended as medical advice for individual conditions or treatments  Talk to your doctor, nurse or pharmacist before following any medical regimen to see if it is safe and effective for you  Subjective:      Patient ID: Jaspreet Ramos is a 63 y o  male  Chief Complaint   Patient presents with   • Diabetes     Follow up        HPI     Follow-up  Interval hx reviewed  QaT2K=3 9%up from 6 6%  Admits to noncompliance w diet/exerise  BP wnl  Due for add'l  labs    The following portions of the patient's history were reviewed and updated as appropriate: allergies, current medications, past family history, past medical history, past social history, past surgical history and problem list     Review of Systems   Constitutional: Positive for fatigue  Negative for diaphoresis and fever  Respiratory: Negative for shortness of breath and wheezing  Cardiovascular: Negative for chest pain, palpitations and leg swelling  Gastrointestinal: Negative  Endocrine:        DM   Musculoskeletal: Positive for arthralgias and myalgias  Skin: Negative for rash  Allergic/Immunologic: Positive for environmental allergies  Neurological: Negative  Current Outpatient Medications   Medication Sig Dispense Refill   • amLODIPine (NORVASC) 5 mg tablet TAKE 1 TABLET (5 MG TOTAL) BY MOUTH DAILY 90 tablet 1   • Blood Glucose Monitoring Suppl (ONE TOUCH ULTRA 2) w/Device KIT TEST 1 TO 2 TIMES A DAY AS DIRECTED     • Blood Glucose Monitoring Suppl (OneTouch Verio Reflect) w/Device KIT Check blood sugars once daily  Please substitute with appropriate alternative as covered by patient's insurance  Dx: E11 65 1 kit 0   • glucose blood (OneTouch Verio) test strip Check blood sugars once daily  Please substitute with appropriate alternative as covered by patient's insurance   Dx: E11 65 100 each 3   • Lancets (OneTouch Delica Plus [id number]) MISC USE AS DIRECTED 100 each 3   • metFORMIN (GLUCOPHAGE-XR) 500 mg 24 hr tablet Take 1 tablet (500 mg total) by mouth daily with dinner 90 tablet 1   • OneTouch Ultra test strip Use 1 each daily Use as instructed Dx: E11 65 100 strip 3   • tadalafil (CIALIS) 10 MG tablet TAKE 1 TABLET (10 MG TOTAL) BY MOUTH DAILY AS NEEDED FOR ERECTILE DYSFUNCTION 10 tablet 0   • triamcinolone (KENALOG) 0 5 % cream APPLY TOPICALLY 2 (TWO) TIMES A DAY FOR 14 DAYS 30 g 0     No current facility-administered medications for this visit  Objective:    /80   Pulse 68   Temp 97 9 °F (36 6 °C)   Resp 14   Ht 5' 10" (1 778 m)   Wt 108 kg (238 lb 6 4 oz)   BMI 34 21 kg/m²        Physical Exam  Vitals and nursing note reviewed  Constitutional:       General: He is not in acute distress  Comments: OW   Cardiovascular:      Rate and Rhythm: Normal rate and regular rhythm  Pulses: no weak pulses          Dorsalis pedis pulses are 2+ on the right side and 2+ on the left side  Posterior tibial pulses are 2+ on the right side and 2+ on the left side  Heart sounds:     No gallop  Pulmonary:      Effort: Pulmonary effort is normal       Breath sounds: Normal breath sounds  Musculoskeletal:      Cervical back: Neck supple  Feet:      Right foot:      Skin integrity: No ulcer, skin breakdown, erythema, warmth, callus or dry skin  Left foot:      Skin integrity: No ulcer, skin breakdown, erythema, warmth, callus or dry skin  Neurological:      General: No focal deficit present  Mental Status: He is alert and oriented to person, place, and time  Cranial Nerves: No cranial nerve deficit  Diabetic Foot Exam    Patient's shoes and socks removed  Right Foot/Ankle   Right Foot Inspection  Skin Exam: skin normal and skin intact  No dry skin, no warmth, no callus, no erythema, no maceration, no abnormal color, no pre-ulcer, no ulcer and no callus  Toe Exam: ROM and strength within normal limits       Sensory   Vibration: intact  Monofilament testing: intact    Vascular  Capillary refills: < 3 seconds  The right DP pulse is 2+  The right PT pulse is 2+  Left Foot/Ankle  Left Foot Inspection  Skin Exam: skin normal and skin intact  No dry skin, no warmth, no erythema, no maceration, normal color, no pre-ulcer, no ulcer and no callus  Toe Exam: ROM and strength within normal limits  Sensory   Vibration: intact  Monofilament testing: intact    Vascular  Capillary refills: < 3 seconds  The left DP pulse is 2+  The left PT pulse is 2+       Assign Risk Category  No deformity present  No loss of protective sensation  No weak pulses  Risk: 0       Jackie Bhandari MD

## 2022-12-14 LAB
ALBUMIN SERPL-MCNC: 4.5 G/DL (ref 3.8–4.9)
ALBUMIN/GLOB SERPL: 2 {RATIO} (ref 1.2–2.2)
ALP SERPL-CCNC: 82 IU/L (ref 44–121)
ALT SERPL-CCNC: 28 IU/L (ref 0–44)
AMYLASE SERPL-CCNC: 69 U/L (ref 31–110)
AST SERPL-CCNC: 21 IU/L (ref 0–40)
BILIRUB SERPL-MCNC: 0.4 MG/DL (ref 0–1.2)
BUN SERPL-MCNC: 11 MG/DL (ref 8–27)
BUN/CREAT SERPL: 10 (ref 10–24)
CALCIUM SERPL-MCNC: 9.3 MG/DL (ref 8.6–10.2)
CHLORIDE SERPL-SCNC: 107 MMOL/L (ref 96–106)
CHOLEST SERPL-MCNC: 183 MG/DL (ref 100–199)
CHOLEST/HDLC SERPL: 5.4 RATIO (ref 0–5)
CO2 SERPL-SCNC: 22 MMOL/L (ref 20–29)
CREAT SERPL-MCNC: 1.14 MG/DL (ref 0.76–1.27)
EGFR: 74 ML/MIN/1.73
ERYTHROCYTE [DISTWIDTH] IN BLOOD BY AUTOMATED COUNT: 13 % (ref 11.6–15.4)
GLOBULIN SER-MCNC: 2.3 G/DL (ref 1.5–4.5)
GLUCOSE SERPL-MCNC: 172 MG/DL (ref 70–99)
HCT VFR BLD AUTO: 43.7 % (ref 37.5–51)
HDLC SERPL-MCNC: 34 MG/DL
HGB BLD-MCNC: 14.9 G/DL (ref 13–17.7)
LDLC SERPL CALC-MCNC: 129 MG/DL (ref 0–99)
LIPASE SERPL-CCNC: 48 U/L (ref 13–78)
MAGNESIUM SERPL-MCNC: 2.2 MG/DL (ref 1.6–2.3)
MCH RBC QN AUTO: 29.3 PG (ref 26.6–33)
MCHC RBC AUTO-ENTMCNC: 34.1 G/DL (ref 31.5–35.7)
MCV RBC AUTO: 86 FL (ref 79–97)
MICRODELETION SYND BLD/T FISH: NORMAL
PLATELET # BLD AUTO: 222 X10E3/UL (ref 150–450)
POTASSIUM SERPL-SCNC: 4.6 MMOL/L (ref 3.5–5.2)
PROT SERPL-MCNC: 6.8 G/DL (ref 6–8.5)
PSA SERPL-MCNC: 2.7 NG/ML (ref 0–4)
RBC # BLD AUTO: 5.09 X10E6/UL (ref 4.14–5.8)
SL AMB VLDL CHOLESTEROL CALC: 20 MG/DL (ref 5–40)
SODIUM SERPL-SCNC: 141 MMOL/L (ref 134–144)
TRIGL SERPL-MCNC: 110 MG/DL (ref 0–149)
TSH SERPL DL<=0.005 MIU/L-ACNC: 1.94 UIU/ML (ref 0.45–4.5)
WBC # BLD AUTO: 5.1 X10E3/UL (ref 3.4–10.8)

## 2023-01-18 DIAGNOSIS — I10 ESSENTIAL HYPERTENSION: ICD-10-CM

## 2023-01-18 RX ORDER — AMLODIPINE BESYLATE 5 MG/1
TABLET ORAL
Qty: 90 TABLET | Refills: 1 | Status: SHIPPED | OUTPATIENT
Start: 2023-01-18

## 2023-01-19 DIAGNOSIS — N52.9 ERECTILE DYSFUNCTION, UNSPECIFIED ERECTILE DYSFUNCTION TYPE: ICD-10-CM

## 2023-01-19 RX ORDER — TADALAFIL 10 MG/1
10 TABLET ORAL DAILY PRN
Qty: 10 TABLET | Refills: 0 | Status: SHIPPED | OUTPATIENT
Start: 2023-01-19

## 2023-03-03 DIAGNOSIS — E11.9 TYPE 2 DIABETES MELLITUS WITHOUT COMPLICATION, WITHOUT LONG-TERM CURRENT USE OF INSULIN (HCC): ICD-10-CM

## 2023-03-03 RX ORDER — METFORMIN HYDROCHLORIDE 500 MG/1
500 TABLET, EXTENDED RELEASE ORAL
Qty: 90 TABLET | Refills: 1 | Status: SHIPPED | OUTPATIENT
Start: 2023-03-03

## 2023-04-26 ENCOUNTER — OFFICE VISIT (OUTPATIENT)
Dept: FAMILY MEDICINE CLINIC | Facility: CLINIC | Age: 63
End: 2023-04-26

## 2023-04-26 VITALS
WEIGHT: 240.6 LBS | TEMPERATURE: 98.1 F | HEART RATE: 92 BPM | HEIGHT: 70 IN | BODY MASS INDEX: 34.44 KG/M2 | DIASTOLIC BLOOD PRESSURE: 84 MMHG | RESPIRATION RATE: 16 BRPM | SYSTOLIC BLOOD PRESSURE: 136 MMHG

## 2023-04-26 DIAGNOSIS — Z00.00 ANNUAL PHYSICAL EXAM: Primary | ICD-10-CM

## 2023-04-26 DIAGNOSIS — E78.2 MIXED HYPERLIPIDEMIA: ICD-10-CM

## 2023-04-26 DIAGNOSIS — E11.9 TYPE 2 DIABETES MELLITUS WITHOUT COMPLICATION, WITHOUT LONG-TERM CURRENT USE OF INSULIN (HCC): ICD-10-CM

## 2023-04-26 DIAGNOSIS — I10 ESSENTIAL HYPERTENSION: ICD-10-CM

## 2023-04-26 LAB
SL AMB  POCT GLUCOSE, UA: NORMAL
SL AMB LEUKOCYTE ESTERASE,UA: NORMAL
SL AMB POCT BILIRUBIN,UA: NORMAL
SL AMB POCT BLOOD,UA: NORMAL
SL AMB POCT CLARITY,UA: CLEAR
SL AMB POCT COLOR,UA: YELLOW
SL AMB POCT HEMOGLOBIN AIC: 6.9 (ref ?–6.5)
SL AMB POCT KETONES,UA: NORMAL
SL AMB POCT NITRITE,UA: NORMAL
SL AMB POCT PH,UA: 6
SL AMB POCT SPECIFIC GRAVITY,UA: 1.01
SL AMB POCT URINE PROTEIN: NORMAL
SL AMB POCT UROBILINOGEN: NORMAL

## 2023-04-26 RX ORDER — LISINOPRIL 2.5 MG/1
2.5 TABLET ORAL DAILY
Qty: 90 TABLET | Refills: 3 | Status: SHIPPED | OUTPATIENT
Start: 2023-04-26

## 2023-04-26 RX ORDER — BLOOD SUGAR DIAGNOSTIC
STRIP MISCELLANEOUS
Qty: 100 EACH | Refills: 3 | Status: SHIPPED | OUTPATIENT
Start: 2023-04-26

## 2023-04-26 NOTE — PROGRESS NOTES
FAMILY [facility] HEALTH MAINTENANCE OFFICE VISIT  [facility]    NAME: Joanie Hutson  AGE: 63 y o  SEX: male  : 1960     DATE: 2023    Assessment and Plan     1  Annual physical exam  -     POCT urine dip auto non-scope    2  Type 2 diabetes mellitus without complication, without long-term current use of insulin ([facility])  -     POCT hemoglobin A1c  -     Microalbumin / creatinine urine ratio  -     lisinopril (ZESTRIL) 2 5 mg tablet; Take 1 tablet (2 5 mg total) by mouth daily  -     glucose blood (OneTouch Verio) test strip; Check blood sugars once daily  Please substitute with appropriate alternative as covered by patient's insurance  Dx: E11 65    3  Essential hypertension  -     lisinopril (ZESTRIL) 2 5 mg tablet; Take 1 tablet (2 5 mg total) by mouth daily    4  Mixed hyperlipidemia    5  BMI 34 0-34 9,adult      Patient Counseling:   Nutrition: Stressed importance of a well balanced diet, moderation of sodium/saturated fat, caloric balance and sufficient intake of fiber  Exercise: Stressed the importance of regular exercise with a goal of 150 minutes per week  Dental Health: Discussed daily flossing and brushing and regular dental visits   Injury Prevention: Discussed Safety Belts, Safety Helmets, and Smoke Detectors    Immunizations reviewed: Risks and Benefits discussed  Discussed benefits of:  Colon Cancer Screening, Prostate Cancer Screening  and Screening labs  BMI Counseling: Body mass index is 34 52 kg/m²  Discussed with patient's BMI with him  The BMI is above normal  Nutrition recommendations include 3-5 servings of fruits/vegetables daily, consuming healthier snacks, decreasing soda and/or juice intake, moderation in carbohydrate intake, increasing intake of lean protein, reducing intake of saturated fat and trans fat and reducing intake of cholesterol   Exercise recommendations include exercising 3-5 times per week and strength training exercises  Chief Complaint     Chief Complaint   Patient presents with   • Physical Exam     Dm check        History of Present Illness     HPI    Well Adult Physical   Patient here for a comprehensive physical exam       Diet and Physical Activity  Diet: well balanced diet  Exercise: intermittently      Depression Screen  PHQ-2/9 Depression Screening    Little interest or pleasure in doing things: 0 - not at all  Feeling down, depressed, or hopeless: 0 - not at all  PHQ-2 Score: 0  PHQ-2 Interpretation: Negative depression screen          General Health  Hearing: Normal:  bilateral  Vision: goes for regular eye exams  Dental: regular dental visits    Reproductive Health  No issues       The following portions of the patient's history were reviewed and updated as appropriate: allergies, current medications, past family history, past medical history, past social history, past surgical history and problem list     Review of Systems     Review of Systems   Constitutional: Positive for fatigue  Negative for diaphoresis and fever  Respiratory: Negative for shortness of breath and wheezing  Cardiovascular: Negative for chest pain, palpitations and leg swelling  Gastrointestinal: Negative  Endocrine:        DM   Musculoskeletal: Positive for arthralgias and myalgias  Skin: Negative for rash  Allergic/Immunologic: Positive for environmental allergies  Neurological: Negative          Past Medical History     Past Medical History:   Diagnosis Date   • Elevated pancreatic enzyme    • Erectile dysfunction    • Fatty liver    • Gall bladder stones    • Hyperlipidemia    • Hypertension    • Obesity, Class II, BMI 35 0-39 9, with comorbidity (see actual BMI)     last assessed 12/5/14       Past Surgical History     Past Surgical History:   Procedure Laterality Date   • HERNIA REPAIR         Social History     Social History     Socioeconomic History   • Marital status: /Civil Union     Spouse name: None   • Number of children: None   • Years of education: None   • Highest education level: None   Occupational History   • None   Tobacco Use   • Smoking status: Never   • Smokeless tobacco: Never   Vaping Use   • Vaping Use: Never used   Substance and Sexual Activity   • Alcohol use: No   • Drug use: No   • Sexual activity: Yes     Partners: Female   Other Topics Concern   • None   Social History Narrative   • None     Social Determinants of Health     Financial Resource Strain: Not on file   Food Insecurity: Not on file   Transportation Needs: Not on file   Physical Activity: Not on file   Stress: Not on file   Social Connections: Not on file   Intimate Partner Violence: Not on file   Housing Stability: Not on file       Family History     Family History   Problem Relation Age of Onset   • Hypertension Mother    • Kidney disease Father         end stage   • Dementia Father    • Diabetes Father    • Dementia Brother    • Diabetes Brother        Current Medications       Current Outpatient Medications:   •  amLODIPine (NORVASC) 5 mg tablet, TAKE 1 TABLET (5 MG TOTAL) BY MOUTH DAILY, Disp: 90 tablet, Rfl: 1  •  Blood Glucose Monitoring Suppl (ONE TOUCH ULTRA 2) w/Device KIT, TEST 1 TO 2 TIMES A DAY AS DIRECTED, Disp: , Rfl:   •  Blood Glucose Monitoring Suppl (OneTouch Verio Reflect) w/Device KIT, Check blood sugars once daily  Please substitute with appropriate alternative as covered by patient's insurance  Dx: E11 65, Disp: 1 kit, Rfl: 0  •  glucose blood (OneTouch Verio) test strip, Check blood sugars once daily  Please substitute with appropriate alternative as covered by patient's insurance   Dx: E11 65, Disp: 100 each, Rfl: 3  •  Lancets (OneTouch Delica Plus [id number]) MISC, USE AS DIRECTED, Disp: 100 each, Rfl: 3  •  lisinopril (ZESTRIL) 2 5 mg tablet, Take 1 tablet (2 5 mg total) by mouth daily, Disp: 90 tablet, Rfl: 3  •  metFORMIN (GLUCOPHAGE-XR) 500 mg 24 hr tablet, TAKE 1 TABLET (500 MG TOTAL) BY MOUTH "DAILY WITH DINNER, Disp: 90 tablet, Rfl: 1  •  OneTouch Ultra test strip, Use 1 each daily Use as instructed Dx: E11 65, Disp: 100 strip, Rfl: 3  •  tadalafil (CIALIS) 10 MG tablet, TAKE 1 TABLET (10 MG TOTAL) BY MOUTH DAILY AS NEEDED FOR ERECTILE DYSFUNCTION, Disp: 10 tablet, Rfl: 0  •  triamcinolone (KENALOG) 0 5 % cream, APPLY TOPICALLY 2 (TWO) TIMES A DAY FOR 14 DAYS, Disp: 30 g, Rfl: 0     Allergies     Allergies   Allergen Reactions   • Azithromycin Edema     Immunization History   Administered Date(s) Administered   • COVID-19 PFIZER VACCINE 0 3 ML IM 04/29/2021, 05/21/2021   • Tuberculin Skin Test-PPD Intradermal 12/19/2017   '  Objective     /84 (BP Location: Left arm, Patient Position: Sitting, Cuff Size: Large)   Pulse 92   Temp 98 1 °F (36 7 °C)   Resp 16   Ht 5' 10\" (1 778 m)   Wt 109 kg (240 lb 9 6 oz)   BMI 34 52 kg/m²      Physical Exam  Vitals and nursing note reviewed  Constitutional:       General: He is not in acute distress  Appearance: Normal appearance  Comments: OW   Cardiovascular:      Rate and Rhythm: Normal rate and regular rhythm  Pulses: no weak pulses          Dorsalis pedis pulses are 2+ on the right side and 2+ on the left side  Posterior tibial pulses are 2+ on the right side and 2+ on the left side  Heart sounds:     No gallop  Pulmonary:      Effort: Pulmonary effort is normal       Breath sounds: Normal breath sounds  Abdominal:      General: Bowel sounds are normal       Palpations: Abdomen is soft  Tenderness: There is no abdominal tenderness  There is no right CVA tenderness or left CVA tenderness  Musculoskeletal:         General: Normal range of motion  Cervical back: Neck supple  No tenderness  Right lower leg: No edema  Left lower leg: No edema  Feet:      Right foot:      Skin integrity: No ulcer, skin breakdown, erythema, warmth, callus or dry skin        Left foot:      Skin integrity: No ulcer, skin " breakdown, erythema, warmth, callus or dry skin  Skin:     General: Skin is warm and dry  Findings: No bruising, lesion or rash  Neurological:      General: No focal deficit present  Mental Status: He is alert and oriented to person, place, and time  Cranial Nerves: No cranial nerve deficit  Psychiatric:         Mood and Affect: Mood normal            Vision Screening    Right eye Left eye Both eyes   Without correction      With correction 20/25 20/25 20/20       Diabetic Foot Exam    Patient's shoes and socks removed  Right Foot/Ankle   Right Foot Inspection  Skin Exam: skin normal and skin intact  No dry skin, no warmth, no callus, no erythema, no maceration, no abnormal color, no pre-ulcer, no ulcer and no callus  Toe Exam: ROM and strength within normal limits  Sensory   Monofilament testing: intact    Vascular  Capillary refills: < 3 seconds  The right DP pulse is 2+  The right PT pulse is 2+  Left Foot/Ankle  Left Foot Inspection  Skin Exam: skin normal and skin intact  No dry skin, no warmth, no erythema, no maceration, normal color, no pre-ulcer, no ulcer and no callus  Toe Exam: ROM and strength within normal limits  Sensory   Monofilament testing: intact    Vascular  Capillary refills: < 3 seconds  The left DP pulse is 2+  The left PT pulse is 2+       Assign Risk Category  No deformity present  No loss of protective sensation  No weak pulses  Risk: 0      Arjun Ford MD  [address]

## 2023-04-27 LAB
ALBUMIN/CREAT UR: 3 MG/G CREAT (ref 0–29)
CREAT UR-MCNC: 130.7 MG/DL
MICROALBUMIN UR-MCNC: 4.2 UG/ML

## 2023-07-21 ENCOUNTER — OFFICE VISIT (OUTPATIENT)
Dept: FAMILY MEDICINE CLINIC | Facility: CLINIC | Age: 63
End: 2023-07-21
Payer: COMMERCIAL

## 2023-07-21 VITALS
BODY MASS INDEX: 34.83 KG/M2 | WEIGHT: 243.3 LBS | RESPIRATION RATE: 16 BRPM | HEART RATE: 88 BPM | TEMPERATURE: 98.4 F | HEIGHT: 70 IN | SYSTOLIC BLOOD PRESSURE: 136 MMHG | DIASTOLIC BLOOD PRESSURE: 76 MMHG

## 2023-07-21 DIAGNOSIS — K80.20 GALLSTONES: ICD-10-CM

## 2023-07-21 DIAGNOSIS — E11.9 TYPE 2 DIABETES MELLITUS WITHOUT COMPLICATION, WITHOUT LONG-TERM CURRENT USE OF INSULIN (HCC): Primary | ICD-10-CM

## 2023-07-21 DIAGNOSIS — R10.11 ABDOMINAL PAIN, RUQ: ICD-10-CM

## 2023-07-21 DIAGNOSIS — E78.2 MIXED HYPERLIPIDEMIA: ICD-10-CM

## 2023-07-21 DIAGNOSIS — I10 ESSENTIAL HYPERTENSION: ICD-10-CM

## 2023-07-21 LAB — SL AMB POCT HEMOGLOBIN AIC: 7.7 (ref ?–6.5)

## 2023-07-21 PROCEDURE — 83036 HEMOGLOBIN GLYCOSYLATED A1C: CPT | Performed by: FAMILY MEDICINE

## 2023-07-21 PROCEDURE — 99214 OFFICE O/P EST MOD 30 MIN: CPT | Performed by: FAMILY MEDICINE

## 2023-07-21 NOTE — PROGRESS NOTES
Assessment/Plan:    1. Type 2 diabetes mellitus without complication, without long-term current use of insulin (HCC)  Assessment & Plan:    Lab Results   Component Value Date    HGBA1C 7.7 (A) 07/21/2023   worsening  Increase metformin to twice daily  Encouraged adherence to ADA diet  Increase exercise as tolerated  Maintain yearly eye exam    Orders:  -     POCT hemoglobin A1c    2. Gallstones  -     US right upper quadrant; Future; Expected date: 07/21/2023    3. Abdominal pain, RUQ  -     US right upper quadrant; Future; Expected date: 07/21/2023    4. Essential hypertension  Assessment & Plan:  DASH diet  Cont amlodipine and lisinopril      5. Mixed hyperlipidemia  Assessment & Plan:  Low chol diet      6. BMI 34.0-34.9,adult              Subjective:      Patient ID: Mary Lou Newberry is a 66 y.o. male. Chief Complaint   Patient presents with   • Diabetes Type 2       HPI    Lidnbl-it-cgrt Cipriano Romano) present  Interval hx reviewed  Xho4x=6.7% (increased from prior=6.9%)  BP in range  Admits to some non-compliance w diet   Has not been exercising regularly  C/o intermittent RUQ abd pain  occ nausea, no v/d  No rash, fever or melena    The following portions of the patient's history were reviewed and updated as appropriate: allergies, current medications, past family history, past medical history, past social history, past surgical history and problem list.    Review of Systems   Constitutional: Positive for fatigue. Negative for diaphoresis and fever. Respiratory: Negative for shortness of breath and wheezing. Cardiovascular: Negative for chest pain, palpitations and leg swelling. Gastrointestinal: Negative. Endocrine:        DM   Musculoskeletal: Positive for arthralgias and myalgias. Skin: Negative for rash. Allergic/Immunologic: Positive for environmental allergies. Neurological: Negative.           Current Outpatient Medications   Medication Sig Dispense Refill   • amLODIPine (NORVASC) 5 mg tablet TAKE 1 TABLET (5 MG TOTAL) BY MOUTH DAILY 90 tablet 1   • Blood Glucose Monitoring Suppl (ONE TOUCH ULTRA 2) w/Device KIT TEST 1 TO 2 TIMES A DAY AS DIRECTED     • Blood Glucose Monitoring Suppl (OneTouch Verio Reflect) w/Device KIT Check blood sugars once daily. Please substitute with appropriate alternative as covered by patient's insurance. Dx: E11.65 1 kit 0   • glucose blood (OneTouch Verio) test strip Check blood sugars once daily. Please substitute with appropriate alternative as covered by patient's insurance. Dx: E11.65 100 each 3   • Lancets (OneTouch Delica Plus [id number]) MISC USE AS DIRECTED 100 each 3   • lisinopril (ZESTRIL) 2.5 mg tablet Take 1 tablet (2.5 mg total) by mouth daily 90 tablet 3   • metFORMIN (GLUCOPHAGE-XR) 500 mg 24 hr tablet TAKE 1 TABLET (500 MG TOTAL) BY MOUTH DAILY WITH DINNER 90 tablet 1   • OneTouch Ultra test strip Use 1 each daily Use as instructed Dx: E11.65 100 strip 3   • tadalafil (CIALIS) 10 MG tablet TAKE 1 TABLET (10 MG TOTAL) BY MOUTH DAILY AS NEEDED FOR ERECTILE DYSFUNCTION 10 tablet 0   • triamcinolone (KENALOG) 0.5 % cream APPLY TOPICALLY 2 (TWO) TIMES A DAY FOR 14 DAYS 30 g 0     No current facility-administered medications for this visit. Objective:    /76   Pulse 88   Temp 98.4 °F (36.9 °C)   Resp 16   Ht 5' 10" (1.778 m)   Wt 110 kg (243 lb 4.8 oz)   BMI 34.91 kg/m²        Physical Exam  Vitals and nursing note reviewed. Constitutional:       General: He is not in acute distress. Appearance: Normal appearance. Comments: OW   Cardiovascular:      Rate and Rhythm: Normal rate and regular rhythm. Pulses: no weak pulses          Dorsalis pedis pulses are 2+ on the right side and 2+ on the left side. Posterior tibial pulses are 2+ on the right side and 2+ on the left side. Heart sounds:      No gallop. Pulmonary:      Effort: Pulmonary effort is normal.      Breath sounds: Normal breath sounds.    Abdominal: General: Bowel sounds are normal.      Palpations: Abdomen is soft. Tenderness: There is no abdominal tenderness. There is no right CVA tenderness or left CVA tenderness. Musculoskeletal:         General: Normal range of motion. Cervical back: Neck supple. No tenderness. Right lower leg: No edema. Left lower leg: No edema. Feet:      Right foot:      Skin integrity: No ulcer, skin breakdown, erythema, warmth, callus or dry skin. Left foot:      Skin integrity: No ulcer, skin breakdown, erythema, warmth, callus or dry skin. Skin:     General: Skin is warm and dry. Findings: No bruising, lesion or rash. Neurological:      General: No focal deficit present. Mental Status: He is alert and oriented to person, place, and time. Cranial Nerves: No cranial nerve deficit. Psychiatric:         Mood and Affect: Mood normal.         Diabetic Foot Exam    Patient's shoes and socks removed. Right Foot/Ankle   Right Foot Inspection  Skin Exam: skin normal and skin intact. No dry skin, no warmth, no callus, no erythema, no maceration, no abnormal color, no pre-ulcer, no ulcer and no callus. Toe Exam: ROM and strength within normal limits. Sensory   Monofilament testing: intact    Vascular  Capillary refills: < 3 seconds  The right DP pulse is 2+. The right PT pulse is 2+. Left Foot/Ankle  Left Foot Inspection  Skin Exam: skin normal and skin intact. No dry skin, no warmth, no erythema, no maceration, normal color, no pre-ulcer, no ulcer and no callus. Toe Exam: ROM and strength within normal limits. Sensory   Monofilament testing: intact    Vascular  Capillary refills: < 3 seconds  The left DP pulse is 2+. The left PT pulse is 2+.      Assign Risk Category  No deformity present  No loss of protective sensation  No weak pulses  Risk: 0         Sara Gtz MD

## 2023-07-27 ENCOUNTER — HOSPITAL ENCOUNTER (OUTPATIENT)
Dept: RADIOLOGY | Facility: HOSPITAL | Age: 63
Discharge: HOME | End: 2023-07-27
Attending: FAMILY MEDICINE
Payer: COMMERCIAL

## 2023-07-27 DIAGNOSIS — R10.11 ABDOMINAL PAIN, RUQ: ICD-10-CM

## 2023-07-27 DIAGNOSIS — K80.20 GALLSTONES: ICD-10-CM

## 2023-07-27 PROCEDURE — 76705 ECHO EXAM OF ABDOMEN: CPT

## 2023-07-28 NOTE — ASSESSMENT & PLAN NOTE
Lab Results   Component Value Date    HGBA1C 7.7 (A) 07/21/2023   worsening  Increase metformin to twice daily  Encouraged adherence to ADA diet  Increase exercise as tolerated  Maintain yearly eye exam

## 2023-10-02 ENCOUNTER — OFFICE VISIT (OUTPATIENT)
Dept: FAMILY MEDICINE CLINIC | Facility: CLINIC | Age: 63
End: 2023-10-02
Payer: COMMERCIAL

## 2023-10-02 VITALS
HEART RATE: 66 BPM | BODY MASS INDEX: 34.93 KG/M2 | DIASTOLIC BLOOD PRESSURE: 82 MMHG | TEMPERATURE: 98.5 F | SYSTOLIC BLOOD PRESSURE: 136 MMHG | HEIGHT: 70 IN | WEIGHT: 244 LBS | RESPIRATION RATE: 16 BRPM

## 2023-10-02 DIAGNOSIS — L08.9 SKIN INFECTION: ICD-10-CM

## 2023-10-02 DIAGNOSIS — L25.9 CONTACT DERMATITIS, UNSPECIFIED CONTACT DERMATITIS TYPE, UNSPECIFIED TRIGGER: Primary | ICD-10-CM

## 2023-10-02 PROCEDURE — 99213 OFFICE O/P EST LOW 20 MIN: CPT | Performed by: FAMILY MEDICINE

## 2023-10-02 RX ORDER — DOXYCYCLINE HYCLATE 100 MG
100 TABLET ORAL 2 TIMES DAILY
Qty: 28 TABLET | Refills: 0 | Status: SHIPPED | OUTPATIENT
Start: 2023-10-02 | End: 2023-10-16

## 2023-10-02 RX ORDER — PREDNISONE 20 MG/1
40 TABLET ORAL DAILY
Qty: 10 TABLET | Refills: 0 | Status: SHIPPED | OUTPATIENT
Start: 2023-10-02 | End: 2023-10-07

## 2023-10-09 NOTE — PROGRESS NOTES
Assessment/Plan:    1. Contact dermatitis, unspecified contact dermatitis type, unspecified trigger  -     predniSONE 20 mg tablet; Take 2 tablets (40 mg total) by mouth daily for 5 days    2. Skin infection  -     doxycycline hyclate (VIBRA-TABS) 100 mg tablet; Take 1 tablet (100 mg total) by mouth 2 (two) times a day for 14 days    3. BMI 35.0-35.9,adult          Subjective:      Patient ID: Belinda Knowles is a 66 y.o. male. Chief Complaint   Patient presents with   • Wound     Left shin wound that he has had for alittle over a week ,(wife says he likes to pick at things and he has been rubbing it ) he was outside doing yard work because he also has some spots on his right leg, neck a couple spots on his arms , and leg wrist. Patient said all these areas itch . Patient also said he was stung by yellow jackets prior when he was outside        HPI     In w wife w c/o skin lesions on legs and arms  Worst spot is on left shin  Started after working in yard about a week ago  +Itchiness, no pain/fever or trauma  ?being stung by yellow jackets  +poison ivy/sumac in yard    The following portions of the patient's history were reviewed and updated as appropriate: allergies, current medications, past family history, past medical history, past social history, past surgical history and problem list.    Review of Systems    Per [facility]    Current Outpatient Medications   Medication Sig Dispense Refill   • amLODIPine (NORVASC) 5 mg tablet TAKE 1 TABLET (5 MG TOTAL) BY MOUTH DAILY 90 tablet 1   • Blood Glucose Monitoring Suppl (ONE TOUCH ULTRA 2) w/Device KIT TEST 1 TO 2 TIMES A DAY AS DIRECTED     • Blood Glucose Monitoring Suppl (OneTouch Verio Reflect) w/Device KIT Check blood sugars once daily. Please substitute with appropriate alternative as covered by patient's insurance.  Dx: E11.65 1 kit 0   • doxycycline hyclate (VIBRA-TABS) 100 mg tablet Take 1 tablet (100 mg total) by mouth 2 (two) times a day for 14 days 28 tablet 0   • glucose blood (OneTouch Verio) test strip Check blood sugars once daily. Please substitute with appropriate alternative as covered by patient's insurance. Dx: E11.65 100 each 3   • Lancets (OneTouch Delica Plus [id number]) MISC USE AS DIRECTED 100 each 3   • metFORMIN (GLUCOPHAGE-XR) 500 mg 24 hr tablet TAKE 1 TABLET (500 MG TOTAL) BY MOUTH DAILY WITH DINNER 90 tablet 1   • OneTouch Ultra test strip Use 1 each daily Use as instructed Dx: E11.65 100 strip 3   • tadalafil (CIALIS) 10 MG tablet TAKE 1 TABLET (10 MG TOTAL) BY MOUTH DAILY AS NEEDED FOR ERECTILE DYSFUNCTION 10 tablet 0   • lisinopril (ZESTRIL) 2.5 mg tablet TAKE 1 TABLET (2.5 MG TOTAL) BY MOUTH DAILY 90 tablet 1   • triamcinolone (KENALOG) 0.5 % cream APPLY TOPICALLY 2 (TWO) TIMES A DAY FOR 14 DAYS 30 g 0     No current facility-administered medications for this visit. Objective:    /82 (BP Location: Left arm, Patient Position: Sitting, Cuff Size: Large)   Pulse 66   Temp 98.5 °F (36.9 °C)   Resp 16   Ht 5' 10" (1.778 m)   Wt 111 kg (244 lb)   BMI 35.01 kg/m²        Physical Exam  Vitals and nursing note reviewed. Constitutional:       General: He is not in acute distress. Appearance: He is obese. Cardiovascular:      Rate and Rhythm: Normal rate and regular rhythm. Pulmonary:      Effort: Pulmonary effort is normal.      Breath sounds: Normal breath sounds. No wheezing. Musculoskeletal:      Cervical back: Neck supple. No tenderness. Skin:     General: Skin is warm and dry. Findings: Rash (vesiculobullous lesion-some clumped/linear-b/l shins L>R and on b/l upper arms and neck) present. Neurological:      Mental Status: He is alert.          Nava Toscano MD

## 2023-10-20 ENCOUNTER — TELEPHONE (OUTPATIENT)
Age: 63
End: 2023-10-20

## 2023-10-20 DIAGNOSIS — L25.9 CONTACT DERMATITIS, UNSPECIFIED CONTACT DERMATITIS TYPE, UNSPECIFIED TRIGGER: Primary | ICD-10-CM

## 2023-10-20 RX ORDER — METHYLPREDNISOLONE 4 MG/1
TABLET ORAL
Qty: 21 EACH | Refills: 0 | Status: SHIPPED | OUTPATIENT
Start: 2023-10-20

## 2023-10-20 NOTE — TELEPHONE ENCOUNTER
Shane Molina called in stating he finished the doxycycline and poison ivy rash is not getting any better. Patient was seen on 10/2/23 with Dr. Cate Alaniz. Patient would like to speak with Dr. Cate Alaniz in-regards to what else he can do at this point.

## 2023-10-25 DIAGNOSIS — L03.116 BILATERAL LOWER LEG CELLULITIS: Primary | ICD-10-CM

## 2023-10-25 DIAGNOSIS — L03.115 BILATERAL LOWER LEG CELLULITIS: Primary | ICD-10-CM

## 2023-10-25 RX ORDER — SULFAMETHOXAZOLE AND TRIMETHOPRIM 800; 160 MG/1; MG/1
1 TABLET ORAL 2 TIMES DAILY
Qty: 14 TABLET | Refills: 0 | Status: SHIPPED | OUTPATIENT
Start: 2023-10-25 | End: 2023-11-01

## 2023-12-15 ENCOUNTER — NURSE TRIAGE (OUTPATIENT)
Age: 63
End: 2023-12-15

## 2023-12-15 NOTE — TELEPHONE ENCOUNTER
Patient feels the Metformin is not working. It appears patient may be due for an appointment . Told him the office will call, they may advise setting up an appointment to discuss . Denies any hyperglycemia symptoms. Admits to making poor choices with his meals yesterday.  States he ate a lot of cheese, sausage and an a hot dog for lunch and pizza last night

## 2023-12-15 NOTE — TELEPHONE ENCOUNTER
Regarding: sugar over 300  ----- Message from Sadi Freed sent at 12/15/2023  9:46 AM EST -----  Pt called because his sugar was over 300 this morning. Pt was drinking coffee with Splenda. I tried to warm transfer but no answer. Please, triage.

## 2023-12-15 NOTE — TELEPHONE ENCOUNTER
Reason for Disposition  • Blood glucose > 300 mg/dL (16.7 mmol/L) AND two or more times in a row    Answer Assessment - Initial Assessment Questions  1. BLOOD GLUCOSE: "What is your blood glucose level?"       Fasting 315  2. ONSET: "When did you check the blood glucose?"      This morning   3. USUAL RANGE: "What is your glucose level usually?" (e.g., usual fasting morning value, usual evening value)      175-200  4. KETONES: "Do you check for ketones (urine or blood test strips)?" If yes, ask: "What does the test show now?"         5. TYPE 1 or 2:  "Do you know what type of diabetes you have?"  (e.g., Type 1, Type 2, Gestational; doesn't know)       Type 2  6. INSULIN: "Do you take insulin?" "What type of insulin(s) do you use? What is the mode of delivery? (syringe, pen; injection or pump)? "       Not on insulin  7. DIABETES PILLS: "Do you take any pills for your diabetes?" If yes, ask: "Have you missed taking any pills recently?"      Metformin  mg   8.  OTHER SYMPTOMS: "Do you have any symptoms?" (e.g., fever, frequent urination, difficulty breathing, dizziness, weakness, vomiting)      Denies any symptoms    Protocols used: Diabetes - High Blood Sugar-ADULT-[place]

## 2024-01-03 DIAGNOSIS — E08.65 DIABETES MELLITUS DUE TO UNDERLYING CONDITION, UNCONTROLLED, WITH HYPERGLYCEMIA (HCC): ICD-10-CM

## 2024-01-03 RX ORDER — LANCETS 30 GAUGE
EACH MISCELLANEOUS
Qty: 100 EACH | Refills: 3 | Status: SHIPPED | OUTPATIENT
Start: 2024-01-03

## 2024-01-23 DIAGNOSIS — I10 ESSENTIAL HYPERTENSION: ICD-10-CM

## 2024-01-23 DIAGNOSIS — E11.9 TYPE 2 DIABETES MELLITUS WITHOUT COMPLICATION, WITHOUT LONG-TERM CURRENT USE OF INSULIN (HCC): ICD-10-CM

## 2024-01-24 RX ORDER — AMLODIPINE BESYLATE 5 MG/1
TABLET ORAL
Qty: 90 TABLET | Refills: 1 | Status: SHIPPED | OUTPATIENT
Start: 2024-01-24

## 2024-01-24 RX ORDER — LISINOPRIL 2.5 MG/1
2.5 TABLET ORAL DAILY
Qty: 90 TABLET | Refills: 0 | Status: SHIPPED | OUTPATIENT
Start: 2024-01-24

## 2024-04-10 ENCOUNTER — TELEPHONE (OUTPATIENT)
Age: 64
End: 2024-04-10

## 2024-04-10 DIAGNOSIS — E11.9 TYPE 2 DIABETES MELLITUS WITHOUT COMPLICATION, WITHOUT LONG-TERM CURRENT USE OF INSULIN (HCC): Primary | ICD-10-CM

## 2024-04-10 RX ORDER — LANCETS 33 GAUGE
EACH MISCELLANEOUS
Qty: 100 EACH | Refills: 3 | Status: SHIPPED | OUTPATIENT
Start: 2024-04-10

## 2024-04-10 RX ORDER — BLOOD SUGAR DIAGNOSTIC
STRIP MISCELLANEOUS
Qty: 100 EACH | Refills: 3 | Status: SHIPPED | OUTPATIENT
Start: 2024-04-10

## 2024-04-10 NOTE — TELEPHONE ENCOUNTER
Call received from [facility] stating that the script for Lancets (One Touch Verio) test script need to specify how many time a day the patient will be using them. Please send updated script to pharmacy. Thank you.

## 2024-04-26 ENCOUNTER — TELEPHONE (OUTPATIENT)
Age: 64
End: 2024-04-26

## 2024-04-26 NOTE — TELEPHONE ENCOUNTER
Pt's wife stated pt needs a 2 month supply of test strips ( pt only rceived the needles).   Please send to:   [facility]. [place], [place]      Please contact pt when this is done. Thank you for your help.

## 2024-04-28 DIAGNOSIS — E08.65 DIABETES MELLITUS DUE TO UNDERLYING CONDITION, UNCONTROLLED, WITH HYPERGLYCEMIA (HCC): ICD-10-CM

## 2024-04-28 RX ORDER — BLOOD SUGAR DIAGNOSTIC
1 STRIP MISCELLANEOUS 2 TIMES DAILY
Qty: 100 STRIP | Refills: 3 | Status: SHIPPED | OUTPATIENT
Start: 2024-04-28 | End: 2024-04-28

## 2024-04-28 RX ORDER — BLOOD SUGAR DIAGNOSTIC
STRIP MISCELLANEOUS
Qty: 200 EACH | Refills: 3 | Status: SHIPPED | OUTPATIENT
Start: 2024-04-28

## 2024-04-28 RX ORDER — BLOOD SUGAR DIAGNOSTIC
1 STRIP MISCELLANEOUS 2 TIMES DAILY
Qty: 100 STRIP | Refills: 3 | Status: SHIPPED | OUTPATIENT
Start: 2024-04-28

## 2024-05-28 ENCOUNTER — TELEPHONE (OUTPATIENT)
Age: 64
End: 2024-05-28

## 2024-07-11 DIAGNOSIS — I10 ESSENTIAL HYPERTENSION: ICD-10-CM

## 2024-07-11 DIAGNOSIS — E11.9 TYPE 2 DIABETES MELLITUS WITHOUT COMPLICATION, WITHOUT LONG-TERM CURRENT USE OF INSULIN (HCC): ICD-10-CM

## 2024-07-11 RX ORDER — LISINOPRIL 2.5 MG/1
2.5 TABLET ORAL DAILY
Qty: 90 TABLET | Refills: 0 | Status: SHIPPED | OUTPATIENT
Start: 2024-07-11

## 2024-07-15 DIAGNOSIS — I10 ESSENTIAL HYPERTENSION: ICD-10-CM

## 2024-07-16 RX ORDER — AMLODIPINE BESYLATE 5 MG/1
TABLET ORAL
Qty: 90 TABLET | Refills: 1 | Status: SHIPPED | OUTPATIENT
Start: 2024-07-16

## 2024-08-07 ENCOUNTER — OFFICE VISIT (OUTPATIENT)
Dept: FAMILY MEDICINE CLINIC | Facility: CLINIC | Age: 64
End: 2024-08-07
Payer: COMMERCIAL

## 2024-08-07 VITALS
RESPIRATION RATE: 12 BRPM | WEIGHT: 231 LBS | BODY MASS INDEX: 33.07 KG/M2 | OXYGEN SATURATION: 99 % | TEMPERATURE: 98.5 F | DIASTOLIC BLOOD PRESSURE: 96 MMHG | HEIGHT: 70 IN | HEART RATE: 87 BPM | SYSTOLIC BLOOD PRESSURE: 150 MMHG

## 2024-08-07 DIAGNOSIS — Z12.5 PROSTATE CANCER SCREENING: ICD-10-CM

## 2024-08-07 DIAGNOSIS — R07.9 CHEST PAIN, UNSPECIFIED TYPE: Primary | ICD-10-CM

## 2024-08-07 DIAGNOSIS — R14.0 GASSINESS: ICD-10-CM

## 2024-08-07 DIAGNOSIS — E11.9 TYPE 2 DIABETES MELLITUS WITHOUT COMPLICATION, WITHOUT LONG-TERM CURRENT USE OF INSULIN (HCC): ICD-10-CM

## 2024-08-07 DIAGNOSIS — E78.2 MIXED HYPERLIPIDEMIA: ICD-10-CM

## 2024-08-07 DIAGNOSIS — I10 ESSENTIAL HYPERTENSION: ICD-10-CM

## 2024-08-07 DIAGNOSIS — Z82.49 FAMILY HISTORY OF HEART DISEASE: ICD-10-CM

## 2024-08-07 DIAGNOSIS — Z13.29 SCREENING FOR THYROID DISORDER: ICD-10-CM

## 2024-08-07 LAB
SL AMB  POCT GLUCOSE, UA: NORMAL
SL AMB LEUKOCYTE ESTERASE,UA: NORMAL
SL AMB POCT BILIRUBIN,UA: NORMAL
SL AMB POCT BLOOD,UA: NORMAL
SL AMB POCT CLARITY,UA: CLEAR
SL AMB POCT COLOR,UA: YELLOW
SL AMB POCT HEMOGLOBIN AIC: 8.2 (ref ?–6.5)
SL AMB POCT KETONES,UA: NORMAL
SL AMB POCT NITRITE,UA: NORMAL
SL AMB POCT PH,UA: 6.5
SL AMB POCT SPECIFIC GRAVITY,UA: 1
SL AMB POCT URINE PROTEIN: NORMAL
SL AMB POCT UROBILINOGEN: NORMAL

## 2024-08-07 PROCEDURE — 93000 ELECTROCARDIOGRAM COMPLETE: CPT | Performed by: FAMILY MEDICINE

## 2024-08-07 PROCEDURE — 99215 OFFICE O/P EST HI 40 MIN: CPT | Performed by: FAMILY MEDICINE

## 2024-08-07 PROCEDURE — 81002 URINALYSIS NONAUTO W/O SCOPE: CPT | Performed by: FAMILY MEDICINE

## 2024-08-07 PROCEDURE — 83036 HEMOGLOBIN GLYCOSYLATED A1C: CPT | Performed by: FAMILY MEDICINE

## 2024-08-27 NOTE — PROGRESS NOTES
"Assessment/Plan:    1. Chest pain, unspecified type  Comments:  prob muscular origin but w +cardiac risks rec cardiac eval as did not complete w past referral  Rec ED if worsening sx prior to seeing specialist  Orders:  -     POCT ECG  -     CBC; Future  -     CBC  2. Type 2 diabetes mellitus without complication, without long-term current use of insulin (HCC)  -     POCT urine dip  -     POCT hemoglobin A1c  -     Comprehensive metabolic panel; Future  -     Lipase; Future  -     Magnesium; Future  -     Comprehensive metabolic panel  -     Lipase  -     Magnesium  3. Mixed hyperlipidemia  -     Comprehensive metabolic panel; Future  -     Lipase; Future  -     Lipid Panel with Direct LDL reflex; Future  -     TSH, 3rd generation; Future  -     Comprehensive metabolic panel  -     Lipase  -     Lipid Panel with Direct LDL reflex  -     TSH, 3rd generation  4. Essential hypertension  -     Comprehensive metabolic panel; Future  -     Comprehensive metabolic panel  5. Family history of heart disease  6. Gassiness  7. BMI 33.0-33.9,adult  8. Prostate cancer screening  -     PSA, Total Screen; Future  9. Screening for thyroid disorder  -     TSH, 3rd generation; Future  -     TSH, 3rd generation        Encouraged compliance with medications/diet  Follow-up post lab completion, sooner prn if change/concern in sxs    Subjective:      Patient ID: Weston Polanco is a 64 y.o. male.    Chief Complaint   Patient presents with   • Chest Pain     Started this morning       HPI    Walk-in visit  W wife  States felt intermittent chest pain since this morning  Has had similar \"twinges\" in past--attributed to muscular origin  No sxs currently  Feels pull in muscle acw with certain movements--no pressure or radiation  Denies diaphoreses, shortness of breath or radiation to neck or down arm  No numbness, rash or fever  BP above range--+/- compliance w meds/diet/exercise  EKG: SR-NSST change-No acute isch change-No change from " prior  Stress test ordered in past but pt never shceduled    The following portions of the patient's history were reviewed and updated as appropriate: allergies, current medications, past family history, past medical history, past social history, past surgical history and problem list.  Past Medical History:   Diagnosis Date   • Elevated pancreatic enzyme    • Erectile dysfunction    • Fatty liver    • Gall bladder stones    • Hyperlipidemia    • Hypertension    • Obesity, Class II, BMI 35.0-39.9, with comorbidity (see actual BMI)     last assessed 12/5/14     Past Surgical History:   Procedure Laterality Date   • HERNIA REPAIR         Review of Systems    Per hpi  Current Outpatient Medications   Medication Sig Dispense Refill   • amLODIPine (NORVASC) 5 mg tablet TAKE 1 TABLET (5 MG TOTAL) BY MOUTH DAILY 90 tablet 1   • Blood Glucose Monitoring Suppl (ONE TOUCH ULTRA 2) w/Device KIT TEST 1 TO 2 TIMES A DAY AS DIRECTED     • Blood Glucose Monitoring Suppl (OneTouch Verio Reflect) w/Device KIT Check blood sugars once daily. Please substitute with appropriate alternative as covered by patient's insurance. Dx: E11.65 1 kit 0   • glucose blood (OneTouch Verio) test strip Check blood sugars twice daily. Please substitute with appropriate alternative as covered by patient's insurance. Dx: E11.65 200 each 3   • lisinopril (ZESTRIL) 2.5 mg tablet TAKE 1 TABLET (2.5 MG TOTAL) BY MOUTH DAILY 90 tablet 0   • metFORMIN (GLUCOPHAGE-XR) 500 mg 24 hr tablet TAKE 1 TABLET (500 MG TOTAL) BY MOUTH DAILY WITH DINNER 90 tablet 1   • methylPREDNISolone 4 MG tablet therapy pack Use as directed on package 21 each 0   • OneTouch Ultra test strip Use 1 each 2 (two) times a day Use as instructed Dx: E11.65 100 strip 3   • tadalafil (CIALIS) 10 MG tablet TAKE 1 TABLET (10 MG TOTAL) BY MOUTH DAILY AS NEEDED FOR ERECTILE DYSFUNCTION 10 tablet 0   • triamcinolone (KENALOG) 0.5 % cream APPLY TOPICALLY 2 (TWO) TIMES A DAY FOR 14 DAYS 30 g 0     No  "current facility-administered medications for this visit.     Allergies   Allergen Reactions   • Azithromycin Edema     Immunization History   Administered Date(s) Administered   • COVID-19 PFIZER VACCINE 0.3 ML IM 04/29/2021, 05/21/2021   • Tuberculin Skin Test-PPD Intradermal 12/19/2017       Objective:    /96 (BP Location: Right arm, Patient Position: Supine, Cuff Size: Large)   Pulse 87   Temp 98.5 °F (36.9 °C) (Temporal)   Resp 12   Ht 5' 10\" (1.778 m)   Wt 105 kg (231 lb)   SpO2 99%   BMI 33.15 kg/m²        Physical Exam  Vitals and nursing note reviewed.   Constitutional:       General: He is not in acute distress.     Appearance: He is obese. He is not ill-appearing or diaphoretic.   Neck:      Vascular: No JVD.   Cardiovascular:      Rate and Rhythm: Normal rate and regular rhythm.      Heart sounds: No murmur heard.  Pulmonary:      Effort: Pulmonary effort is normal. No respiratory distress.      Breath sounds: Normal breath sounds.   Chest:      Chest wall: Tenderness (left costosnhondral mm) present. No crepitus.   Abdominal:      General: Bowel sounds are normal.      Palpations: Abdomen is soft.      Tenderness: There is no abdominal tenderness.   Musculoskeletal:         General: Normal range of motion.      Cervical back: Neck supple.      Right lower leg: No edema.      Left lower leg: No edema.   Skin:     General: Skin is warm and dry.   Neurological:      General: No focal deficit present.      Mental Status: He is alert and oriented to person, place, and time.      Cranial Nerves: No cranial nerve deficit.   Psychiatric:         Mood and Affect: Mood normal.           Margy Godoy MD  "

## 2024-10-09 DIAGNOSIS — I10 ESSENTIAL HYPERTENSION: ICD-10-CM

## 2024-10-09 RX ORDER — AMLODIPINE BESYLATE 5 MG/1
5 TABLET ORAL DAILY
Qty: 90 TABLET | Refills: 1 | Status: SHIPPED | OUTPATIENT
Start: 2024-10-09

## 2024-10-10 LAB
ALBUMIN SERPL-MCNC: 4.4 G/DL (ref 3.9–4.9)
ALP SERPL-CCNC: 73 IU/L (ref 44–121)
ALT SERPL-CCNC: 29 IU/L (ref 0–44)
AST SERPL-CCNC: 21 IU/L (ref 0–40)
BILIRUB SERPL-MCNC: 0.7 MG/DL (ref 0–1.2)
BUN SERPL-MCNC: 15 MG/DL (ref 8–27)
BUN/CREAT SERPL: 13 (ref 10–24)
CALCIUM SERPL-MCNC: 9.8 MG/DL (ref 8.6–10.2)
CHLORIDE SERPL-SCNC: 102 MMOL/L (ref 96–106)
CHOLEST SERPL-MCNC: 227 MG/DL (ref 100–199)
CO2 SERPL-SCNC: 23 MMOL/L (ref 20–29)
CREAT SERPL-MCNC: 1.18 MG/DL (ref 0.76–1.27)
EGFR: 70 ML/MIN/1.73
ERYTHROCYTE [DISTWIDTH] IN BLOOD BY AUTOMATED COUNT: 13.1 % (ref 11.6–15.4)
GLOBULIN SER-MCNC: 2.6 G/DL (ref 1.5–4.5)
GLUCOSE SERPL-MCNC: 142 MG/DL (ref 70–99)
HCT VFR BLD AUTO: 45.6 % (ref 37.5–51)
HDLC SERPL-MCNC: 41 MG/DL
HGB BLD-MCNC: 15.3 G/DL (ref 13–17.7)
LDLC SERPL CALC-MCNC: 165 MG/DL (ref 0–99)
LDLC/HDLC SERPL: 4 RATIO (ref 0–3.6)
LIPASE SERPL-CCNC: 57 U/L (ref 13–78)
MAGNESIUM SERPL-MCNC: 2.3 MG/DL (ref 1.6–2.3)
MCH RBC QN AUTO: 28.9 PG (ref 26.6–33)
MCHC RBC AUTO-ENTMCNC: 33.6 G/DL (ref 31.5–35.7)
MCV RBC AUTO: 86 FL (ref 79–97)
MICRODELETION SYND BLD/T FISH: NORMAL
PLATELET # BLD AUTO: 252 X10E3/UL (ref 150–450)
POTASSIUM SERPL-SCNC: 4.9 MMOL/L (ref 3.5–5.2)
PROT SERPL-MCNC: 7 G/DL (ref 6–8.5)
RBC # BLD AUTO: 5.3 X10E6/UL (ref 4.14–5.8)
SL AMB VLDL CHOLESTEROL CALC: 21 MG/DL (ref 5–40)
SODIUM SERPL-SCNC: 140 MMOL/L (ref 134–144)
TRIGL SERPL-MCNC: 114 MG/DL (ref 0–149)
TSH SERPL DL<=0.005 MIU/L-ACNC: 2 UIU/ML (ref 0.45–4.5)
WBC # BLD AUTO: 5.4 X10E3/UL (ref 3.4–10.8)

## 2024-10-23 ENCOUNTER — OFFICE VISIT (OUTPATIENT)
Dept: FAMILY MEDICINE CLINIC | Facility: CLINIC | Age: 64
End: 2024-10-23
Payer: COMMERCIAL

## 2024-10-23 VITALS
RESPIRATION RATE: 12 BRPM | DIASTOLIC BLOOD PRESSURE: 86 MMHG | WEIGHT: 237 LBS | TEMPERATURE: 98 F | SYSTOLIC BLOOD PRESSURE: 130 MMHG | OXYGEN SATURATION: 98 % | BODY MASS INDEX: 33.93 KG/M2 | HEART RATE: 83 BPM | HEIGHT: 70 IN

## 2024-10-23 DIAGNOSIS — E11.9 TYPE 2 DIABETES MELLITUS WITHOUT COMPLICATION, WITHOUT LONG-TERM CURRENT USE OF INSULIN (HCC): ICD-10-CM

## 2024-10-23 DIAGNOSIS — I10 ESSENTIAL HYPERTENSION: ICD-10-CM

## 2024-10-23 DIAGNOSIS — E78.2 MIXED HYPERLIPIDEMIA: ICD-10-CM

## 2024-10-23 DIAGNOSIS — R29.898 CLICKING KNEE: Primary | ICD-10-CM

## 2024-10-23 PROCEDURE — 99214 OFFICE O/P EST MOD 30 MIN: CPT | Performed by: FAMILY MEDICINE

## 2024-10-23 RX ORDER — METFORMIN HYDROCHLORIDE 500 MG/1
500 TABLET, EXTENDED RELEASE ORAL
Qty: 90 TABLET | Refills: 1 | Status: SHIPPED | OUTPATIENT
Start: 2024-10-23

## 2024-10-24 ENCOUNTER — APPOINTMENT (OUTPATIENT)
Dept: RADIOLOGY | Facility: CLINIC | Age: 64
End: 2024-10-24
Payer: COMMERCIAL

## 2024-10-24 DIAGNOSIS — R29.898 CLICKING KNEE: ICD-10-CM

## 2024-10-24 PROCEDURE — 73562 X-RAY EXAM OF KNEE 3: CPT

## 2024-10-24 NOTE — PROGRESS NOTES
Assessment/Plan:   Diagnoses and all orders for this visit:    Clicking knee  -     XR knee 3 vw right non injury; Future  -     Ambulatory Referral to Orthopedic Surgery; Future    Essential hypertension  Comments:  +/-compliance w med-encouraged daily use   DASH diet  sched yearly eye exam    Type 2 diabetes mellitus without complication, without long-term current use of insulin (HCC)  Comments:  ADA diet  renewed metformin  sched yearly eye exam  Orders:  -     metFORMIN (GLUCOPHAGE-XR) 500 mg 24 hr tablet; Take 1 tablet (500 mg total) by mouth daily with dinner    Mixed hyperlipidemia  Comments:  low chol diet  check labs    BMI 34.0-34.9,adult  Comments:  encouraged cont. efforts with weight reduction thru lifesyle modificationss.            Subjective:      Patient ID: Weston Polanco is a 64 y.o. male.    Chief Complaint   Patient presents with    Knee Pain     Rt knee pain  Check up       Knee Pain   The pain is mild. The pain has been Fluctuating since onset. Pertinent negatives include no inability to bear weight, loss of motion, numbness or tingling. Associated symptoms comments: +clicking . He reports no foreign bodies present. The symptoms are aggravated by movement and weight bearing. He has tried nothing for the symptoms.   ?remote trauma-->2-3 yrs ago--car seat pushed forward unexpectedly jamming knee    Initial BP elevated--rpt lower--pt did not take med today  Working on wgt reduction  Has not yet had labs ordered  Req refill DM med      The following portions of the patient's history were reviewed and updated as appropriate: allergies, current medications, past family history, past medical history, past social history, past surgical history and problem list.     Review of Systems   Constitutional:  Negative for fever.   Respiratory: Negative.     Cardiovascular: Negative.    Musculoskeletal:  Positive for arthralgias.   Neurological:  Negative for tingling and numbness.         Objective:    BP  "130/86 (BP Location: Left arm, Patient Position: Sitting, Cuff Size: Large)   Pulse 83   Temp 98 °F (36.7 °C) (Temporal)   Resp 12   Ht 5' 10\" (1.778 m)   Wt 108 kg (237 lb)   SpO2 98%   BMI 34.01 kg/m²        Physical Exam  Vitals and nursing note reviewed.   Constitutional:       General: He is not in acute distress.     Comments: OW   Cardiovascular:      Rate and Rhythm: Normal rate and regular rhythm.   Pulmonary:      Effort: Pulmonary effort is normal. No respiratory distress.   Musculoskeletal:         General: Tenderness (right knee-medially>laterally; no sig swelling; +clicking medial aspect; no calf erythema) present.      Right lower leg: No edema.      Left lower leg: No edema.   Skin:     General: Skin is warm and dry.      Findings: No bruising, lesion or rash.   Neurological:      Mental Status: He is alert and oriented to person, place, and time.                 Margy Godoy MD      "

## 2024-10-28 ENCOUNTER — OFFICE VISIT (OUTPATIENT)
Dept: OBGYN CLINIC | Facility: CLINIC | Age: 64
End: 2024-10-28
Payer: COMMERCIAL

## 2024-10-28 VITALS
HEART RATE: 88 BPM | HEIGHT: 70 IN | SYSTOLIC BLOOD PRESSURE: 147 MMHG | WEIGHT: 236 LBS | BODY MASS INDEX: 33.79 KG/M2 | DIASTOLIC BLOOD PRESSURE: 90 MMHG

## 2024-10-28 DIAGNOSIS — M17.11 PRIMARY OSTEOARTHRITIS OF RIGHT KNEE: Primary | ICD-10-CM

## 2024-10-28 DIAGNOSIS — R29.898 CLICKING KNEE: ICD-10-CM

## 2024-10-28 PROCEDURE — 99203 OFFICE O/P NEW LOW 30 MIN: CPT | Performed by: ORTHOPAEDIC SURGERY

## 2024-10-28 NOTE — PROGRESS NOTES
"Ortho Sports Medicine New Patient Visit     Assesment:   64 y.o. male with right primary knee osteoarthritis    Plan:    We had a long discussion regarding right knee degenerative joint disease, including treatment options. We discussed non-operative treatment options, such as physical therapy, bracing options, OTC medications and supplementation, injection options, weight management, and remaining active. The patient prefers to proceed with formal PT, OTC medications as needed, ice/heat, and self-directed weight management. We discussed low impact cardio with biking and encouraged the patient to remain as active as possible, using pain as a guide. We will plan to follow up with Weston in 6-8 weeks for recheck.      Chief Complaint   Patient presents with    Right Knee - Pain, Clicking       History of Present Illness:    The patient is a 64 y.o. male, presenting for initial evaluation of fluctuating, atraumatic right knee pain localized to the medial aspect. The patient notes an instance of pain roughly 3 years ago after hitting his knee on a car dash board that resolved with home therapies. The patient works as a  and states he recently hit his knee on a dashboard again getting out of his car, which has aggravated his knee pain for the past 3 weeks or so. Pain has fluctuated over this time, but has been worse for about the past 5 days. Currently, Kendallshyann states the knee pain is \"annoying\", but he denies swelling, instability, locking, weakness, or numbness/tingling. He does not a clicking sensation along the front of the knee. Pain also occurs during sleep when his knees touch and he reports stiffness following periods of inactivity. He is using Tylenol and ice/heat as needed for pain control.       Knee Surgical History:  None    Past Medical, Social and Family History:  Past Medical History:   Diagnosis Date    Elevated pancreatic enzyme     Erectile dysfunction     Fatty liver     Gall " bladder stones     Hyperlipidemia     Hypertension     Obesity, Class II, BMI 35.0-39.9, with comorbidity (see actual BMI)     last assessed 12/5/14     Past Surgical History:   Procedure Laterality Date    HERNIA REPAIR       Allergies   Allergen Reactions    Azithromycin Edema     Current Outpatient Medications on File Prior to Visit   Medication Sig Dispense Refill    amLODIPine (NORVASC) 5 mg tablet Take 1 tablet (5 mg total) by mouth daily 90 tablet 1    Blood Glucose Monitoring Suppl (ONE TOUCH ULTRA 2) w/Device KIT TEST 1 TO 2 TIMES A DAY AS DIRECTED      Blood Glucose Monitoring Suppl (OneTouch Verio Reflect) w/Device KIT Check blood sugars once daily. Please substitute with appropriate alternative as covered by patient's insurance. Dx: E11.65 1 kit 0    glucose blood (OneTouch Verio) test strip Check blood sugars twice daily. Please substitute with appropriate alternative as covered by patient's insurance. Dx: E11.65 200 each 3    lisinopril (ZESTRIL) 2.5 mg tablet TAKE 1 TABLET (2.5 MG TOTAL) BY MOUTH DAILY 90 tablet 0    metFORMIN (GLUCOPHAGE-XR) 500 mg 24 hr tablet Take 1 tablet (500 mg total) by mouth daily with dinner 90 tablet 1    methylPREDNISolone 4 MG tablet therapy pack Use as directed on package 21 each 0    OneTouch Ultra test strip Use 1 each 2 (two) times a day Use as instructed Dx: E11.65 100 strip 3    tadalafil (CIALIS) 10 MG tablet TAKE 1 TABLET (10 MG TOTAL) BY MOUTH DAILY AS NEEDED FOR ERECTILE DYSFUNCTION 10 tablet 0    triamcinolone (KENALOG) 0.5 % cream APPLY TOPICALLY 2 (TWO) TIMES A DAY FOR 14 DAYS 30 g 0     No current facility-administered medications on file prior to visit.     Social History     Socioeconomic History    Marital status: /Civil Union     Spouse name: Not on file    Number of children: Not on file    Years of education: Not on file    Highest education level: Not on file   Occupational History    Not on file   Tobacco Use    Smoking status: Never     "Smokeless tobacco: Never   Vaping Use    Vaping status: Never Used   Substance and Sexual Activity    Alcohol use: No    Drug use: No    Sexual activity: Yes     Partners: Female   Other Topics Concern    Not on file   Social History Narrative    Not on file     Social Determinants of Health     Financial Resource Strain: Not on file   Food Insecurity: Not on file   Transportation Needs: Not on file   Physical Activity: Not on file   Stress: Not on file   Social Connections: Not on file   Intimate Partner Violence: Not on file   Housing Stability: Not on file         I have reviewed the past medical, surgical, social and family history, medications and allergies as documented in the EMR.    Review of systems: ROS is negative other than that noted in the HPI.  Constitutional: Negative for fatigue and fever.   HENT: Negative for sore throat.    Respiratory: Negative for shortness of breath.    Cardiovascular: Negative for chest pain.   Gastrointestinal: Negative for abdominal pain.   Endocrine: Negative for cold intolerance and heat intolerance.   Genitourinary: Negative for flank pain.   Musculoskeletal: Negative for back pain.   Skin: Negative for rash.   Allergic/Immunologic: Negative for immunocompromised state.   Neurological: Negative for dizziness.   Psychiatric/Behavioral: Negative for agitation.      Physical Exam:    Blood pressure 147/90, pulse 88, height 5' 10\" (1.778 m), weight 107 kg (236 lb).    General/Constitutional: NAD, well developed, well nourished  HENT: Normocephalic, atraumatic  CV: Intact distal pulses, regular rate  Resp: No respiratory distress or labored breathing  Lymphatic: No lymphadenopathy palpated  Neuro: Alert and Oriented x 3, no focal deficits  Psych: Normal mood, normal affect  Skin: Warm, dry, no rashes, no erythema      Knee Exam:   No significant skin lesions or deformity. No joint effusion. Prepatellar crepitus appreciated. Range of motion from 0 to 130 without pain. Mild " medial joint line tenderness. Knee is stable to varus stress, valgus stress, Lachman, anterior drawer, and posterior drawer.  Neurovascularly intact distally      Knee Imaging    X-rays of the knee reviewed and interpreted today. These show no acute fractures. Mild medial and patellofemoral compartment degenerative changes. Prominent patellar and tibial enthesophytes. The patella is well centered on the trochlea.

## 2024-11-04 ENCOUNTER — EVALUATION (OUTPATIENT)
Dept: PHYSICAL THERAPY | Facility: CLINIC | Age: 64
End: 2024-11-04
Payer: COMMERCIAL

## 2024-11-04 DIAGNOSIS — M17.11 PRIMARY OSTEOARTHRITIS OF RIGHT KNEE: ICD-10-CM

## 2024-11-04 PROCEDURE — 97161 PT EVAL LOW COMPLEX 20 MIN: CPT | Performed by: PHYSICAL MEDICINE & REHABILITATION

## 2024-11-04 NOTE — PROGRESS NOTES
PT Evaluation     Today's date: 11/3/2024  Patient name: Weston Polanco  : 1960  MRN: [id number]  Referring provider: Sherie Connors PA-C  Dx: No diagnosis found.             Pt. arriving to clinic status with complaints of knee pain. Impaired range of motion, strength, functional mobility and motor performance due to localized inflammation and pain.  ***    Due to current deficits, patient is limited functionally. Patient has been educated in joint protection strategies to help mitigate pain and reduce inflammation.  Patient would benefit from skilled physical therapy services to address their aforementioned functional limitations and progress towards prior level of function and independence with home exercise program which patient is expected to achieves within 12-24 visits       Short term goals: 4 weeks  -  Improve walking tolerance to *** minutes to perform household activity  -  Patient to be able to negotiate *** stairs with pain ***/10 pain or less  -  Patient will be able to perform sit to stand transfers from low chair without increased pain  -  Patient to reduce pain to ***/10 at its worst to improve activity tolerance  -  Independent with joint protection strategies to reduce pain     Long term goals: 8 weeks  -   Improve walking tolerance to *** minutes   -   Patient to improve knee flexion to *** degrees to improve stair negotiation without compensatory motions  -   Patient to improve proximal hip strength to ***/5 to improve knee stability  -   Patient to improve quadriceps strength to avoid buckling and reduce risk for falling  -   Patient to reduce pain to ***/10 at its worst to improve QOL and return to function  -   Patient to squat to appropriate knee flexion to perform ADL's and household chores without pain  -   Patient to perform stairs without pain or compensation reciprocally     Impairments:    decreased strength   pain with function   activity intolerance   weight bearing  "intolerance     Prognosis:  Excellent  Positive and negative prognostic indicator(s):  positive attitude about recovery    Planned interventions:  home exercise program, patient education, graded activity, flexibility, functional range of motion exercises, and strengthening    Duration in visits:  12  Frequency: 2 visits per week  Duration in weeks:  4-6    History of Current Injury: The patient is a 64 y.o. male, presenting for initial evaluation of right knee pain localized to the medial aspect. The patient notes an instance of pain roughly 3 years ago after hitting his knee on a car dash board that resolved with home therapies and recently occurred again a month ago.   Currently, Weston states the knee pain is \"annoying\", but he denies swelling, instability, locking, weakness, or numbness/tingling. He does not a clicking sensation along the front of the knee. Pain also occurs during sleep when his knees touch and he reports stiffness following periods of inactivity. He is using Tylenol and ice/heat as needed for pain control.     Pain location: ***  Pain descriptors:  ***  Pain intensity:  ***    Problems:  ***    Aggravating factors: ***  Easing factors: ***    Imaging: ***  Hobbies/Interests/goals: ***  Occupation: ***    OBJECTIVE:    MMT Lower Extremity Evaluation Re-evaluation Re-evaluation Re-evaluation Re-evaluation   0 out of 5 0_/5 Right Left Right Left Right Left Right Left Right Left   Date 11/3/2024       Hip Flexion     ***/5    ***/5           HIp Abduction    ***/5    ***/5           Hip Adduction    ***/5    ***/5           HIp Extension    ***/5    ***/5           Knee Flexion    ***/5    ***/5           Knee Extension    ***/5    ***/5           Ankle Dorsiflexion    ***/5    ***/5           Ankle Plantarflexion    ***/5    ***/5           Ankle Eversion    ***/5    ***/5           Ankle Inversion    ***/5    ***/5               Lower Extremity AROM/PROM Evaluation Re-evaluation Re-evaluation " "Re-evaluation Re-evaluation   Date            11/3/2024        RIght  Left RIght  Left Right Left Right Left Right Left   Knee Extension sitting     ***      ***           Knee Extension supine     ***      ***           Knee Flexion supine     ***      ***           Knee Flexion prone     ***      ***               Measurements: Evaluation Re-evaluation Re-evaluation Re-evaluation Re-evaluation   Date:          11/3/2024        Right Left Right Left Right Left Right Left Right Left   Midpatella     ***cm    ***cm           Suprapatellar 2\" above     ***cm    ***cm           Infrapatellar 2\" below     ***cm    ***cm                            Balance  Evaluation Re-evaluation Re-evaluation Re-evaluation Re-evaluation    Right Left Right Left Right Left Right Left Right Left   Date 11/3/2024       Single limb stance *** ***                 Flexibility Evaluation Re-evaluation Re-evaluation Re-evaluation Re-evaluation   Date 11/3/2024        Right Left Right  Left Right  Left Right  Left Right  Left   Hamstrings     ***    ***           HIp flexors     ***    ***           Quadriceps    ***    ***           Gastroc    ***    ***           Gluteals    ***    ***           Piriformis    ***    ***            (S) Severe ,(M) =Moderate, (Min) =Minimal (N) Normal tightness       AMBULATION:  11/3/2024 ***    Squattin/3/2024 ***    Stair function:  11/3/2024 ***     PALPATION:  11/3/2024 ***         Assessment/Plan    Subjective    Objective           Precautions:      Date              11/3/2024              Visits # EVAL  2 3  4  5    6    Manual                                                                               Neuro               Sidestepping                 SL stance                                                               TE                SLR flex/abd                quad setting                hamstring stretching                bike                                TA                step ups      "                                                           Gait                                                               Modalities                                Joint protection and pain management education and strategies                HEP given and performed , education joint protection strategies

## 2024-11-04 NOTE — PROGRESS NOTES
PT Evaluation     Today's date: 2024  Patient name: Weston Polanco  : 1960  MRN: [id number]  Referring provider: Sherie Connors PA-C  Dx:   Encounter Diagnosis     ICD-10-CM    1. Primary osteoarthritis of right knee  M17.11 Ambulatory Referral to Physical Therapy                     Assessment  Impairments: abnormal or restricted ROM, activity intolerance, impaired physical strength, lacks appropriate home exercise program, pain with function and weight-bearing intolerance  Symptom irritability: moderate    Assessment details: Weston Polanco is an 64 y.o. male  arriving to clinic with complaints of right knee pain. Due to current deficits, patient is limited functionally with ADL's, recreational activities, ambulation, stair negotiation, transfers. Patient has been educated in home exercise program and plan of care. Patient would benefit from skilled physical therapy services to address their aforementioned functional limitations and progress towards prior level of function and independence with home exercise program.      Goals  Short term goals: 4 weeks  1. Improve walking tolerance to 30 minutes to perform household activity  2. Patient to be able to negotiate 13 stairs with pain 2/10 pain or less  3. Patient will be able to perform sit to stand transfers from low chair without increased pain  4. Patient to reduce pain to 2/10 at its worst to improve activity tolerance    Long term goals: 12 weeks  1. Improve walking tolerance to 60 minutes   2. Patient to improve knee flexion to 135 degrees to improve stair negotiation  3. Patient to improve proximal hip strength to 5/5 to improve knee stability  4. Patient to reduce pain to 1/10 at its worst to improve QOL and return to function      Plan  Patient would benefit from: skilled physical therapy  Planned modality interventions: TENS, unattended electrical stimulation, thermotherapy: hydrocollator packs and cryotherapy    Planned therapy  interventions: abdominal trunk stabilization, flexibility, functional ROM exercises, home exercise program, therapeutic exercise, therapeutic activities, stretching, strengthening, self care, postural training, patient education, neuromuscular re-education, massage, manual therapy and joint mobilization    Frequency: 2x week  Duration in weeks: 12  Treatment plan discussed with: patient    Subjective Evaluation    History of Present Illness  Mechanism of injury: Patient reports a chronic history of right knee pain that began years ago noting that it may have come on from working as a  for years at a time. Patient reports about 1-2 months ago he bumped his knee that seemed to flair up his symptoms. Patient states that he has pain with transitions from sit to stand, driving, sleeping on his side. Patient notes that he did get an x-ray that showed mild degenerative changes and his orthopedic doctor referred him to physical therapy.          Recurrent probem    Patient Goals  Patient goals for therapy: decreased pain, increased motion and increased strength  Patient goal: Sleep through the night, perfrom stairs  Pain  Current pain ratin  At best pain ratin  At worst pain ratin  Quality: sharp, discomfort and dull ache  Alleviating factors: Heat, ice, brace.  Exacerbated by: Transitions, stairs, extended walking.  Progression: improved    Social Support  Steps to enter house: yes  Stairs in house: yes   Lives in: multiple-level home    Employment status: not working    Diagnostic Tests  X-ray: abnormal (Degenerative changes)  Treatments  No previous or current treatments      Objective    MMT:    Right  Left    Hip Flexion:   4/5*  5/5  Hip Abduction:   4-/5  4/5  Hip Adduction:   4+/5  4+/5  Hip Extension:   NT/5  NT/5  Knee Flexion:   4+/5*  5/5  Knee Extension:  4/5*  5/5  Ankle Dorsiflexion:  5/5  5/5  Ankle Plantarflexion:  5/5  5/5    AROM:   Right  Left    Knee Flexion:   130  135  Knee  Extension:  -5  0    AMBULATION:  Slightly antalgic    PALPATION:  Patellar hypomobility    FLEXIBILITY:   Quadriceps     SPECIAL TESTS:  Lachmans: (-)  Anterior Draw: (-)  Posterior Lag: (-)  McMurrays: (-)  Thessley: (-)  Patellar Grind: (+) R  Step down: (+) R  Valgus stress test: (+) R         Precautions:   Past Medical History:   Diagnosis Date   • Elevated pancreatic enzyme    • Erectile dysfunction    • Fatty liver    • Gall bladder stones    • Hyperlipidemia    • Hypertension    • Obesity, Class II, BMI 35.0-39.9, with comorbidity (see actual BMI)     last assessed 12/5/14         Date: 11/04/2024       Visit #/  authorized 1/---       Auth exp date ---       FOTO:                Manuals        Patellar mobilization        Quad stretch                        Neuro Re-Ed        Quad set         SLR flex/abd        Bridging        Clamshells                                Ther Ex        STS        Leg press        FSU/LSU        Quad stretch                                        Ther Activity                        Gait Training                        Modalities                          Access Code: [email or web address]  URL: [email or web address]  Date: 11/04/2024  Prepared by: Tommy Caro    Exercises  - Sidelying Quadriceps Stretch  - 1 x daily - 5 x weekly - 4 sets - 30 sec hold  - Long Sitting Quad Set  - 1 x daily - 5 x weekly - 2 sets - 10 reps - 3-5 sec hold  - Seated Long Arc Quad  - 1 x daily - 5 x weekly - 2 sets - 10 reps  - Active Straight Leg Raise with Quad Set  - 1 x daily - 5 x weekly - 2 sets - 10 reps

## 2024-11-11 ENCOUNTER — APPOINTMENT (OUTPATIENT)
Dept: PHYSICAL THERAPY | Facility: CLINIC | Age: 64
End: 2024-11-11
Payer: COMMERCIAL

## 2024-11-14 ENCOUNTER — APPOINTMENT (OUTPATIENT)
Dept: PHYSICAL THERAPY | Facility: CLINIC | Age: 64
End: 2024-11-14
Payer: COMMERCIAL

## 2024-11-18 ENCOUNTER — OFFICE VISIT (OUTPATIENT)
Dept: PHYSICAL THERAPY | Facility: CLINIC | Age: 64
End: 2024-11-18
Payer: COMMERCIAL

## 2024-11-18 DIAGNOSIS — M17.11 PRIMARY OSTEOARTHRITIS OF RIGHT KNEE: Primary | ICD-10-CM

## 2024-11-18 PROCEDURE — 97112 NEUROMUSCULAR REEDUCATION: CPT

## 2024-11-18 PROCEDURE — 97110 THERAPEUTIC EXERCISES: CPT

## 2024-11-18 PROCEDURE — 97140 MANUAL THERAPY 1/> REGIONS: CPT

## 2024-11-18 NOTE — PROGRESS NOTES
"Daily Note     Today's date: 2024  Patient name: Weston Polanco  : 1960  MRN: [id number]  Referring provider: Sherie Connors PA-C  Dx:   Encounter Diagnosis     ICD-10-CM    1. Primary osteoarthritis of right knee  M17.11           Start Time: 1055  Stop Time: 1135  Total time in clinic (min): 40 minutes    Subjective: I adjusted my car seat in order to alleviate my knee pain. My knee feels better & stronger. Report compliance with HEP.      Objective: See treatment diary below      Assessment: Tolerated treatment fair. Patient demonstrated fatigue post treatment, exhibited good technique with therapeutic exercises, and would benefit from continued PT      Plan: Continue per plan of care.      Precautions:   Past Medical History:   Diagnosis Date    Elevated pancreatic enzyme     Erectile dysfunction     Fatty liver     Gall bladder stones     Hyperlipidemia     Hypertension     Obesity, Class II, BMI 35.0-39.9, with comorbidity (see actual BMI)     last assessed 14         Date: 2024      Visit #/  authorized 1/--- 2/---      Auth exp date ---       FOTO:                Manuals  R LE stretching      Patellar mobilization  AD      Quad stretch  AD (prone)                      Neuro Re-Ed        Quad set   ---      SLR flex/abd  ---      Bridging  2x10 reps w/ add squeeze      Clamshells  20/20 GTB                              Ther Ex        STS  22\" mat x 20 reps       Leg press  @#75 seat #11 - 3 x 10 reps       FSU/LSU  6\" step x 10 reps       Quad stretch  Prone 10 x 10 sec                                         Ther Activity                        Gait Training                        Modalities                          Access Code: [email or web address]  URL: [email or web address]  Date: 2024  Prepared by: Tommy Caro    Exercises  - Sidelying Quadriceps Stretch  - 1 x daily - 5 x weekly - 4 sets - 30 sec hold  - Long Sitting Quad Set  - 1 x daily - 5 x weekly - " 2 sets - 10 reps - 3-5 sec hold  - Seated Long Arc Quad  - 1 x daily - 5 x weekly - 2 sets - 10 reps  - Active Straight Leg Raise with Quad Set  - 1 x daily - 5 x weekly - 2 sets - 10 reps

## 2024-11-20 ENCOUNTER — OFFICE VISIT (OUTPATIENT)
Dept: FAMILY MEDICINE CLINIC | Facility: CLINIC | Age: 64
End: 2024-11-20
Payer: COMMERCIAL

## 2024-11-20 DIAGNOSIS — I10 ESSENTIAL HYPERTENSION: ICD-10-CM

## 2024-11-20 DIAGNOSIS — Z82.49 FAMILY HISTORY OF HEART DISEASE: ICD-10-CM

## 2024-11-20 DIAGNOSIS — E11.9 TYPE 2 DIABETES MELLITUS WITHOUT COMPLICATION, WITHOUT LONG-TERM CURRENT USE OF INSULIN (HCC): ICD-10-CM

## 2024-11-20 DIAGNOSIS — E78.2 MIXED HYPERLIPIDEMIA: ICD-10-CM

## 2024-11-20 DIAGNOSIS — Z00.00 ANNUAL PHYSICAL EXAM: Primary | ICD-10-CM

## 2024-11-20 LAB — SL AMB POCT HEMOGLOBIN AIC: 6.9 (ref ?–6.5)

## 2024-11-20 PROCEDURE — 99396 PREV VISIT EST AGE 40-64: CPT | Performed by: FAMILY MEDICINE

## 2024-11-20 PROCEDURE — 83036 HEMOGLOBIN GLYCOSYLATED A1C: CPT | Performed by: FAMILY MEDICINE

## 2024-11-20 NOTE — ASSESSMENT & PLAN NOTE
Lab Results   Component Value Date    HGBA1C 6.9 (A) 11/20/2024       Orders:    POCT hemoglobin A1c    Albumin / creatinine urine ratio

## 2024-11-20 NOTE — PROGRESS NOTES
Adult Annual Physical  Name: Weston Polanco      : 1960      MRN: [id number]  Encounter Provider: Margy Godoy MD  Encounter Date: 2024   Encounter department: [facility]    Assessment & Plan  Annual physical exam    Orders:    Albumin / creatinine urine ratio    Type 2 diabetes mellitus without complication, without long-term current use of insulin (HCC)    Lab Results   Component Value Date    HGBA1C 6.9 (A) 2024       Orders:    POCT hemoglobin A1c    Albumin / creatinine urine ratio    Essential hypertension  Home monitor  Adjust CCB based on readings outside office--pt to call w BP log       Family history of heart disease         Mixed hyperlipidemia         BMI 33.0-33.9,adult         Immunizations and preventive care screenings were discussed with patient today. Appropriate education was printed on patient's after visit summary.        Counseling:  Alcohol/drug use: discussed moderation in alcohol intake, the recommendations for healthy alcohol use, and avoidance of illicit drug use.  Dental Health: discussed importance of regular tooth brushing, flossing, and dental visits.  Injury prevention: discussed safety/seat belts, safety helmets, smoke detectors, carbon monoxide detectors, and smoking near bedding or upholstery.  Exercise: the importance of regular exercise/physical activity was discussed. Recommend exercise 3-5 times per week for at least 30 minutes.          History of Present Illness     Adult Annual Physical  Review of Systems   Constitutional:  Negative for fever.   Eyes:         Wears glasses   Respiratory: Negative.     Cardiovascular: Negative.    Gastrointestinal: Negative.    Endocrine:        DM   Musculoskeletal:  Positive for arthralgias.   Neurological:  Negative for numbness.     Past Medical History   Past Medical History:   Diagnosis Date    Elevated pancreatic enzyme     Erectile dysfunction     Fatty liver     Gall bladder stones      Hyperlipidemia     Hypertension     Obesity, Class II, BMI 35.0-39.9, with comorbidity (see actual BMI)     last assessed 12/5/14     Past Surgical History:   Procedure Laterality Date    HERNIA REPAIR       Family History   Problem Relation Age of Onset    Hypertension Mother     Kidney disease Father         end stage    Dementia Father     Diabetes Father     Dementia Brother     Diabetes Brother       reports that he has never smoked. He has never used smokeless tobacco. He reports that he does not drink alcohol and does not use drugs.  Current Outpatient Medications on File Prior to Visit   Medication Sig Dispense Refill    amLODIPine (NORVASC) 5 mg tablet Take 1 tablet (5 mg total) by mouth daily 90 tablet 1    Blood Glucose Monitoring Suppl (ONE TOUCH ULTRA 2) w/Device KIT TEST 1 TO 2 TIMES A DAY AS DIRECTED      Blood Glucose Monitoring Suppl (OneTouch Verio Reflect) w/Device KIT Check blood sugars once daily. Please substitute with appropriate alternative as covered by patient's insurance. Dx: E11.65 1 kit 0    glucose blood (OneTouch Verio) test strip Check blood sugars twice daily. Please substitute with appropriate alternative as covered by patient's insurance. Dx: E11.65 200 each 3    lisinopril (ZESTRIL) 2.5 mg tablet TAKE 1 TABLET (2.5 MG TOTAL) BY MOUTH DAILY 90 tablet 0    metFORMIN (GLUCOPHAGE-XR) 500 mg 24 hr tablet Take 1 tablet (500 mg total) by mouth daily with dinner 90 tablet 1    methylPREDNISolone 4 MG tablet therapy pack Use as directed on package 21 each 0    OneTouch Ultra test strip Use 1 each 2 (two) times a day Use as instructed Dx: E11.65 100 strip 3    tadalafil (CIALIS) 10 MG tablet TAKE 1 TABLET (10 MG TOTAL) BY MOUTH DAILY AS NEEDED FOR ERECTILE DYSFUNCTION 10 tablet 0    triamcinolone (KENALOG) 0.5 % cream APPLY TOPICALLY 2 (TWO) TIMES A DAY FOR 14 DAYS 30 g 0     No current facility-administered medications on file prior to visit.     Allergies   Allergen Reactions     "Azithromycin Edema      Current Outpatient Medications on File Prior to Visit   Medication Sig Dispense Refill    amLODIPine (NORVASC) 5 mg tablet Take 1 tablet (5 mg total) by mouth daily 90 tablet 1    Blood Glucose Monitoring Suppl (ONE TOUCH ULTRA 2) w/Device KIT TEST 1 TO 2 TIMES A DAY AS DIRECTED      Blood Glucose Monitoring Suppl (OneTouch Verio Reflect) w/Device KIT Check blood sugars once daily. Please substitute with appropriate alternative as covered by patient's insurance. Dx: E11.65 1 kit 0    glucose blood (OneTouch Verio) test strip Check blood sugars twice daily. Please substitute with appropriate alternative as covered by patient's insurance. Dx: E11.65 200 each 3    lisinopril (ZESTRIL) 2.5 mg tablet TAKE 1 TABLET (2.5 MG TOTAL) BY MOUTH DAILY 90 tablet 0    metFORMIN (GLUCOPHAGE-XR) 500 mg 24 hr tablet Take 1 tablet (500 mg total) by mouth daily with dinner 90 tablet 1    methylPREDNISolone 4 MG tablet therapy pack Use as directed on package 21 each 0    OneTouch Ultra test strip Use 1 each 2 (two) times a day Use as instructed Dx: E11.65 100 strip 3    tadalafil (CIALIS) 10 MG tablet TAKE 1 TABLET (10 MG TOTAL) BY MOUTH DAILY AS NEEDED FOR ERECTILE DYSFUNCTION 10 tablet 0    triamcinolone (KENALOG) 0.5 % cream APPLY TOPICALLY 2 (TWO) TIMES A DAY FOR 14 DAYS 30 g 0     No current facility-administered medications on file prior to visit.      Social History     Tobacco Use    Smoking status: Never    Smokeless tobacco: Never   Vaping Use    Vaping status: Never Used   Substance and Sexual Activity    Alcohol use: No    Drug use: No    Sexual activity: Yes     Partners: Female       Objective   /86   Pulse 74   Temp 98.1 °F (36.7 °C) (Temporal)   Resp 12   Ht 5' 10\" (1.778 m)   Wt 106 kg (234 lb)   SpO2 98%   BMI 33.58 kg/m²     Physical Exam  Vitals and nursing note reviewed.   Constitutional:       General: He is not in acute distress.     Comments: OW   HENT:      Nose: Nose normal. "   Eyes:      General: No scleral icterus.     Conjunctiva/sclera: Conjunctivae normal.   Cardiovascular:      Rate and Rhythm: Normal rate and regular rhythm.      Pulses: no weak pulses.           Dorsalis pedis pulses are 2+ on the right side and 2+ on the left side.        Posterior tibial pulses are 2+ on the right side and 2+ on the left side.   Pulmonary:      Effort: Pulmonary effort is normal. No respiratory distress.      Breath sounds: Normal breath sounds.   Abdominal:      General: Bowel sounds are normal.      Palpations: Abdomen is soft.      Tenderness: There is no abdominal tenderness. There is no right CVA tenderness or left CVA tenderness.   Musculoskeletal:         General: Tenderness (right knee-medially>laterally; no sig swelling; +clicking medial aspect; no calf erythema) present.      Cervical back: Neck supple. No tenderness.      Right lower leg: No edema.      Left lower leg: No edema.   Feet:      Right foot:      Skin integrity: Dry skin present. No ulcer, skin breakdown, erythema, warmth or callus.      Left foot:      Skin integrity: Dry skin present. No ulcer, skin breakdown, erythema, warmth or callus.   Skin:     General: Skin is warm and dry.   Neurological:      General: No focal deficit present.      Mental Status: He is alert and oriented to person, place, and time.      Cranial Nerves: No cranial nerve deficit.   Psychiatric:         Mood and Affect: Mood normal.         Diabetic Foot Exam    Patient's shoes and socks removed.    Right Foot/Ankle   Right Foot Inspection  Skin Exam: skin normal, skin intact and dry skin. No warmth, no callus, no erythema, no maceration, no abnormal color, no pre-ulcer, no ulcer and no callus.     Toe Exam: ROM and strength within normal limits.     Sensory   Monofilament testing: intact    Vascular  Capillary refills: < 3 seconds  The right DP pulse is 2+. The right PT pulse is 2+.     Left Foot/Ankle  Left Foot Inspection  Skin Exam: skin  normal, skin intact and dry skin. No warmth, no erythema, no maceration, normal color, no pre-ulcer, no ulcer and no callus.     Toe Exam: ROM and strength within normal limits.     Sensory   Monofilament testing: intact    Vascular  Capillary refills: < 3 seconds  The left DP pulse is 2+. The left PT pulse is 2+.     Assign Risk Category  No deformity present  No loss of protective sensation  No weak pulses  Risk: 0

## 2024-11-21 ENCOUNTER — APPOINTMENT (OUTPATIENT)
Dept: PHYSICAL THERAPY | Facility: CLINIC | Age: 64
End: 2024-11-21
Payer: COMMERCIAL

## 2024-11-21 VITALS
RESPIRATION RATE: 12 BRPM | OXYGEN SATURATION: 98 % | HEIGHT: 70 IN | HEART RATE: 74 BPM | DIASTOLIC BLOOD PRESSURE: 86 MMHG | TEMPERATURE: 98.1 F | BODY MASS INDEX: 33.5 KG/M2 | WEIGHT: 234 LBS | SYSTOLIC BLOOD PRESSURE: 136 MMHG

## 2024-11-21 LAB
ALBUMIN/CREAT UR: <7 MG/G CREAT (ref 0–29)
CREAT UR-MCNC: 45.3 MG/DL
MICROALBUMIN UR-MCNC: <3 UG/ML

## 2024-11-25 ENCOUNTER — OFFICE VISIT (OUTPATIENT)
Dept: PHYSICAL THERAPY | Facility: CLINIC | Age: 64
End: 2024-11-25
Payer: COMMERCIAL

## 2024-11-25 DIAGNOSIS — M17.11 PRIMARY OSTEOARTHRITIS OF RIGHT KNEE: Primary | ICD-10-CM

## 2024-11-25 PROCEDURE — 97112 NEUROMUSCULAR REEDUCATION: CPT | Performed by: PHYSICAL MEDICINE & REHABILITATION

## 2024-11-25 PROCEDURE — 97110 THERAPEUTIC EXERCISES: CPT | Performed by: PHYSICAL MEDICINE & REHABILITATION

## 2024-11-25 PROCEDURE — 97140 MANUAL THERAPY 1/> REGIONS: CPT | Performed by: PHYSICAL MEDICINE & REHABILITATION

## 2024-11-25 NOTE — PROGRESS NOTES
"Daily Note     Today's date: 2024  Patient name: Weston Polanco  : 1960  MRN: [id number]  Referring provider: Sherie Connors PA-C  Dx:   Encounter Diagnosis     ICD-10-CM    1. Primary osteoarthritis of right knee  M17.11                        Subjective: Patient reports that he can feel his knee has improved in mobility and strength, stating less pain noted.      Objective: See treatment diary below      Assessment: Tolerated treatment fair. Patient demonstrated fatigue post treatment, exhibited good technique with therapeutic exercises, and would benefit from continued PT      Plan: Continue per plan of care.      Precautions:   Past Medical History:   Diagnosis Date    Elevated pancreatic enzyme     Erectile dysfunction     Fatty liver     Gall bladder stones     Hyperlipidemia     Hypertension     Obesity, Class II, BMI 35.0-39.9, with comorbidity (see actual BMI)     last assessed 14         Date: 2024     Visit #/  authorized 1/--- 2/--- 3/---     Auth exp date ---       FOTO:                Manuals  R LE stretching      Patellar mobilization  AD TC     Quad stretch  AD (prone) TC in left sidelying                     Neuro Re-Ed        Quad set   ---      SLR flex/abd  --- With #2 20x flex  With #0 20x abd     Bridging  2x10 reps w/ add squeeze 2x10 reps w/ add squeeze     Clamshells  20/20 GTB Sidelying 20x R/L with GTB     LAQ   #4 R 2x10                     Ther Ex        Bike   5 min start of session     STS  22\" mat x 20 reps  From 20\" 20x with arms crossed     Heel raises   From 1/2 foam 20x     Leg press  @#75 seat #11 - 3 x 10 reps       FSU/LSU  6\" step x 10 reps  2x10 each at 6\" step     Quad stretch  Prone 10 x 10 sec  Prone 10 x 10 sec                                        Ther Activity                        Gait Training                        Modalities                          Access Code: [email or web address]  URL: " [email or web address]  Date: 11/04/2024  Prepared by: Tommy Caro    Exercises  - Sidelying Quadriceps Stretch  - 1 x daily - 5 x weekly - 4 sets - 30 sec hold  - Long Sitting Quad Set  - 1 x daily - 5 x weekly - 2 sets - 10 reps - 3-5 sec hold  - Seated Long Arc Quad  - 1 x daily - 5 x weekly - 2 sets - 10 reps  - Active Straight Leg Raise with Quad Set  - 1 x daily - 5 x weekly - 2 sets - 10 reps

## 2024-11-29 ENCOUNTER — OFFICE VISIT (OUTPATIENT)
Dept: PHYSICAL THERAPY | Facility: CLINIC | Age: 64
End: 2024-11-29
Payer: COMMERCIAL

## 2024-11-29 DIAGNOSIS — M17.11 PRIMARY OSTEOARTHRITIS OF RIGHT KNEE: Primary | ICD-10-CM

## 2024-11-29 PROCEDURE — 97112 NEUROMUSCULAR REEDUCATION: CPT | Performed by: PHYSICAL THERAPIST

## 2024-11-29 NOTE — PROGRESS NOTES
"Daily Note     Today's date: 2024  Patient name: Weston Polanco  : 1960  MRN: [id number]  Referring provider: Sherie Connors PA-C  Dx:   Encounter Diagnosis     ICD-10-CM    1. Primary osteoarthritis of right knee  M17.11                      Subjective: I am feeling stronger      Objective: See treatment diary below      Assessment: Tolerated treatment well. Patient demonstrated fatigue post treatment and exhibited good technique with therapeutic exercises      Plan: Continue per plan of care.      Precautions:   Past Medical History:   Diagnosis Date    Elevated pancreatic enzyme     Erectile dysfunction     Fatty liver     Gall bladder stones     Hyperlipidemia     Hypertension     Obesity, Class II, BMI 35.0-39.9, with comorbidity (see actual BMI)     last assessed 14         Date: 2024    Visit #/  authorized 1/--- 2/--- 3/--- 4    Auth exp date ---       FOTO:                Manuals  R LE stretching      Patellar mobilization  AD TC     Quad stretch  AD (prone) TC in left sidelying                     Neuro Re-Ed        Quad set   ---      SLR flex/abd  --- With #2 20x flex  With #0 20x abd     Bridging  2x10 reps w/ add squeeze 2x10 reps w/ add squeeze     Clamshells  20/20 GTB Sidelying 20x R/L with GTB     LAQ   #4 R 2x10     Bal cushion squats    HHA 20x    TRX squats    20x    Ther Ex        Bike   5 min start of session 10m    STS  22\" mat x 20 reps  From 20\" 20x with arms crossed 20x 18\"    Heel raises   From 1/2 foam 20x     Leg press  @#75 seat #11 - 3 x 10 reps   #95 bilater 20x  #75 unilat 20x    FSU/LSU  6\" step x 10 reps  2x10 each at 6\" step 8\" 2x20    Quad stretch  Prone 10 x 10 sec  Prone 10 x 10 sec                                        Ther Activity                        Gait Training                        Modalities                          Access Code: [email or web address]  URL: [email or web address]  Date: 2024  Prepared by: " Tommy Caro    Exercises  - Sidelying Quadriceps Stretch  - 1 x daily - 5 x weekly - 4 sets - 30 sec hold  - Long Sitting Quad Set  - 1 x daily - 5 x weekly - 2 sets - 10 reps - 3-5 sec hold  - Seated Long Arc Quad  - 1 x daily - 5 x weekly - 2 sets - 10 reps  - Active Straight Leg Raise with Quad Set  - 1 x daily - 5 x weekly - 2 sets - 10 reps

## 2024-12-09 ENCOUNTER — OFFICE VISIT (OUTPATIENT)
Dept: PHYSICAL THERAPY | Facility: CLINIC | Age: 64
End: 2024-12-09
Payer: COMMERCIAL

## 2024-12-09 DIAGNOSIS — M17.11 PRIMARY OSTEOARTHRITIS OF RIGHT KNEE: Primary | ICD-10-CM

## 2024-12-09 PROCEDURE — 97112 NEUROMUSCULAR REEDUCATION: CPT

## 2024-12-09 PROCEDURE — 97110 THERAPEUTIC EXERCISES: CPT

## 2024-12-09 NOTE — PROGRESS NOTES
"Daily Note     Today's date: 2024  Patient name: Weston Polanco  : 1960  MRN: [id number]  Referring provider: Sherie Connors PA-C  Dx:   Encounter Diagnosis     ICD-10-CM    1. Primary osteoarthritis of right knee  M17.11           Start Time:           Subjective: My knee is feeling stronger.       Objective: See treatment diary below      Assessment: Tolerated treatment well. Patient demonstrated fatigue post treatment, exhibited good technique with therapeutic exercises, and would benefit from continued PT      Plan: Progress treatment as tolerated.       Precautions:   Past Medical History:   Diagnosis Date    Elevated pancreatic enzyme     Erectile dysfunction     Fatty liver     Gall bladder stones     Hyperlipidemia     Hypertension     Obesity, Class II, BMI 35.0-39.9, with comorbidity (see actual BMI)     last assessed 14         Date: 2024   Visit #/  authorized 1/--- 2/--- 3/--- 4 5/ FOTO   Auth exp date ---       FOTO:                Manuals  R LE stretching      Patellar mobilization  AD TC     Quad stretch  AD (prone) TC in left sidelying  AD (prone)                    Neuro Re-Ed        Quad set   ---      SLR flex/abd  --- With #2 20x flex  With #0 20x abd  With #2 20x flex  With #0 20x abd   Bridging  2x10 reps w/ add squeeze 2x10 reps w/ add squeeze  2x10 reps w/ add squeeze   Clamshells  20/20 GTB Sidelying 20x R/L with GTB     LAQ   #4 R 2x10  -----   Bal cushion squats    HHA 20x    TRX squats    20x    Ther Ex        Bike   5 min start of session 10m NuStep 10 min L1    STS  22\" mat x 20 reps  From 20\" 20x with arms crossed 20x 18\" From 19\" mat  20x with arms crossed   Heel raises   From 1/2 foam 20x  From 1/2 foam 20x   Leg press  @#75 seat #11 - 3 x 10 reps   #95 bilater 20x  #75 unilat 20x #95 bilater 20x  #75 unilat 20x  Seat # 11   FSU/LSU  6\" step x 10 reps  2x10 each at 6\" step 8\" 2x20 2x10 each at 8\" step   Quad stretch "  Prone 10 x 10 sec  Prone 10 x 10 sec   manual                                     Ther Activity                        Gait Training                        Modalities                          Access Code: [email or web address]  URL: [email or web address]  Date: 11/04/2024  Prepared by: Tommy Caro    Exercises  - Sidelying Quadriceps Stretch  - 1 x daily - 5 x weekly - 4 sets - 30 sec hold  - Long Sitting Quad Set  - 1 x daily - 5 x weekly - 2 sets - 10 reps - 3-5 sec hold  - Seated Long Arc Quad  - 1 x daily - 5 x weekly - 2 sets - 10 reps  - Active Straight Leg Raise with Quad Set  - 1 x daily - 5 x weekly - 2 sets - 10 reps

## 2024-12-11 ENCOUNTER — OFFICE VISIT (OUTPATIENT)
Dept: PHYSICAL THERAPY | Facility: CLINIC | Age: 64
End: 2024-12-11
Payer: COMMERCIAL

## 2024-12-11 DIAGNOSIS — M17.11 PRIMARY OSTEOARTHRITIS OF RIGHT KNEE: Primary | ICD-10-CM

## 2024-12-11 PROCEDURE — 97112 NEUROMUSCULAR REEDUCATION: CPT | Performed by: PHYSICAL MEDICINE & REHABILITATION

## 2024-12-11 PROCEDURE — 97110 THERAPEUTIC EXERCISES: CPT | Performed by: PHYSICAL MEDICINE & REHABILITATION

## 2024-12-11 NOTE — PROGRESS NOTES
"Daily Note     Today's date: 2024  Patient name: Weston Polanco  : 1960  MRN: [id number]  Referring provider: Sherie Connors PA-C  Dx:   Encounter Diagnosis     ICD-10-CM    1. Primary osteoarthritis of right knee  M17.11             Start Time: 1100  Stop Time: 1145  Total time in clinic (min): 45 minutes    Subjective: Patient reports improvements in function of his right knee noting less pain with stair negotiation and extended walking, but notes it does still catch at times.      Objective: See treatment diary below      Assessment: Tolerated treatment well. Patient demonstrated fatigue post treatment, exhibited good technique with therapeutic exercises, and would benefit from continued PT      Plan: Progress treatment as tolerated.       Precautions:   Past Medical History:   Diagnosis Date   • Elevated pancreatic enzyme    • Erectile dysfunction    • Fatty liver    • Gall bladder stones    • Hyperlipidemia    • Hypertension    • Obesity, Class II, BMI 35.0-39.9, with comorbidity (see actual BMI)     last assessed 14         Date: 2024   Visit #/  authorized 6/--- 2/--- 3/--- 4 5/ FOTO   Auth exp date        FOTO:                Manuals  R LE stretching      Patellar mobilization  AD TC     Quad stretch TC off table AD (prone) TC in left sidelying  AD (prone)                    Neuro Re-Ed        Quad set   ---      SLR flex/abd With #2 20x flex  With #0 20x abd --- With #2 20x flex  With #0 20x abd  With #2 20x flex  With #0 20x abd   Bridging 2x10 reps w/ add squeeze 2x10 reps w/ add squeeze 2x10 reps w/ add squeeze  2x10 reps w/ add squeeze   Clamshells GTB 20x R/L 20/20 GTB Sidelying 20x R/L with GTB     LAQ #4 R 2x10  #4 R 2x10  -----   Bal cushion squats    HHA 20x    TRX squats 20x   20x    Ther Ex        Bike Bike 10 min L5  5 min start of session 10m NuStep 10 min L1    STS From 19\" mat  20x with arms crossed 22\" mat x 20 reps  From 20\" " "20x with arms crossed 20x 18\" From 19\" mat  20x with arms crossed   Heel raises From 1/2 foam 20x  From 1/2 foam 20x  From 1/2 foam 20x   Leg press #105 bilater 20x  #75 unilat 20x  Seat # 8 @#75 seat #11 - 3 x 10 reps   #95 bilater 20x  #75 unilat 20x #95 bilater 20x  #75 unilat 20x  Seat # 11   FSU/LSU 2x10 each at 8\" step 6\" step x 10 reps  2x10 each at 6\" step 8\" 2x20 2x10 each at 8\" step   Quad stretch Prone 10 x 10 sec  Prone 10 x 10 sec  Prone 10 x 10 sec   manual                                     Ther Activity                        Gait Training                        Modalities                          Access Code: [email or web address]  URL: [email or web address]  Date: 11/04/2024  Prepared by: Tommy Caro    Exercises  - Sidelying Quadriceps Stretch  - 1 x daily - 5 x weekly - 4 sets - 30 sec hold  - Long Sitting Quad Set  - 1 x daily - 5 x weekly - 2 sets - 10 reps - 3-5 sec hold  - Seated Long Arc Quad  - 1 x daily - 5 x weekly - 2 sets - 10 reps  - Active Straight Leg Raise with Quad Set  - 1 x daily - 5 x weekly - 2 sets - 10 reps             "

## 2024-12-16 ENCOUNTER — OFFICE VISIT (OUTPATIENT)
Dept: PHYSICAL THERAPY | Facility: CLINIC | Age: 64
End: 2024-12-16
Payer: COMMERCIAL

## 2024-12-16 DIAGNOSIS — M17.11 PRIMARY OSTEOARTHRITIS OF RIGHT KNEE: Primary | ICD-10-CM

## 2024-12-16 PROCEDURE — 97112 NEUROMUSCULAR REEDUCATION: CPT

## 2024-12-16 PROCEDURE — 97110 THERAPEUTIC EXERCISES: CPT

## 2024-12-16 NOTE — PROGRESS NOTES
"Daily Note     Today's date: 2024  Patient name: Weston Polanco  : 1960  MRN: [id number]  Referring provider: Sherie Connors PA-C  Dx:   Encounter Diagnosis     ICD-10-CM    1. Primary osteoarthritis of right knee  M17.11           Start Time: 1100          Subjective: I just feel a slight R knee \"twinch\" during long car drives.       Objective: See treatment diary below      Assessment: Tolerated treatment well. Patient demonstrated fatigue post treatment, exhibited good technique with therapeutic exercises, and would benefit from continued PT      Plan: Continue per plan of care.      Precautions:   Past Medical History:   Diagnosis Date    Elevated pancreatic enzyme     Erectile dysfunction     Fatty liver     Gall bladder stones     Hyperlipidemia     Hypertension     Obesity, Class II, BMI 35.0-39.9, with comorbidity (see actual BMI)     last assessed 14         Date: 2024   Visit #/  authorized 7/--- 2/--- 3/--- 4 5/ FOTO   Auth exp date        FOTO:                Manuals  R LE stretching      Patellar mobilization  AD TC     Quad stretch AD (prone) AD (prone) TC in left sidelying  AD (prone)                    Neuro Re-Ed        Quad set   ---      SLR flex/abd With #2 20x flex  With #0 20x abd --- With #2 20x flex  With #0 20x abd  With #2 20x flex  With #0 20x abd   Bridging 2x10 reps w/ add squeeze 2x10 reps w/ add squeeze 2x10 reps w/ add squeeze  2x10 reps w/ add squeeze   Clamshells GTB 20/20 20/20 GTB Sidelying 20x R/L with GTB     LAQ #4 R/L 2x10  #4 R 2x10  -----   Bal cushion squats ----   HHA 20x    TRX squats 20x   20x    Ther Ex        Bike Bike 10 min L5  5 min start of session 10m NuStep 10 min L1    STS From 19\" mat  20x with arms crossed 22\" mat x 20 reps  From 20\" 20x with arms crossed 20x 18\" From 19\" mat  20x with arms crossed   Heel raises From 1/2 foam 20x  From 1/2 foam 20x  From 1/2 foam 20x   Leg press #105 bilater " "20x  #75 unilat 20x  Seat # 8 @#75 seat #11 - 3 x 10 reps   #95 bilater 20x  #75 unilat 20x #95 bilater 20x  #75 unilat 20x  Seat # 11   FSU/LSU 2x10 each at 8\" step 6\" step x 10 reps  2x10 each at 6\" step 8\" 2x20 2x10 each at 8\" step   Quad stretch manual Prone 10 x 10 sec  Prone 10 x 10 sec   manual                                     Ther Activity                        Gait Training                        Modalities                          Access Code: [email or web address]  URL: [email or web address]  Date: 11/04/2024  Prepared by: Tommy Caro    Exercises  - Sidelying Quadriceps Stretch  - 1 x daily - 5 x weekly - 4 sets - 30 sec hold  - Long Sitting Quad Set  - 1 x daily - 5 x weekly - 2 sets - 10 reps - 3-5 sec hold  - Seated Long Arc Quad  - 1 x daily - 5 x weekly - 2 sets - 10 reps  - Active Straight Leg Raise with Quad Set  - 1 x daily - 5 x weekly - 2 sets - 10 reps               "

## 2024-12-19 ENCOUNTER — EVALUATION (OUTPATIENT)
Dept: PHYSICAL THERAPY | Facility: CLINIC | Age: 64
End: 2024-12-19
Payer: COMMERCIAL

## 2024-12-19 DIAGNOSIS — M17.11 PRIMARY OSTEOARTHRITIS OF RIGHT KNEE: Primary | ICD-10-CM

## 2024-12-19 PROCEDURE — 97110 THERAPEUTIC EXERCISES: CPT | Performed by: PHYSICAL MEDICINE & REHABILITATION

## 2024-12-19 PROCEDURE — 97112 NEUROMUSCULAR REEDUCATION: CPT | Performed by: PHYSICAL MEDICINE & REHABILITATION

## 2024-12-19 NOTE — PROGRESS NOTES
PT Re-Evaluation     Today's date: 2024  Patient name: Weston Polanco  : 1960  MRN: [id number]  Referring provider: Sherie Connors PA-C  Dx:   Encounter Diagnosis     ICD-10-CM    1. Primary osteoarthritis of right knee  M17.11           Start Time: 1145  Stop Time: 1230  Total time in clinic (min): 45 minutes    Assessment  Impairments: activity intolerance  Symptom irritability: low    Assessment details: Initial evaluation: Weston Polanco is an 64 y.o. male  arriving to clinic with complaints of right knee pain. Due to current deficits, patient is limited functionally with ADL's, recreational activities, ambulation, stair negotiation, transfers. Patient has been educated in home exercise program and plan of care. Patient would benefit from skilled physical therapy services to address their aforementioned functional limitations and progress towards prior level of function and independence with home exercise program.   Re-evaluation: Patient is arriving to clinic following 4 weeks of formal physical therapy. Patient has been consistent with attendance to therapy, motivated during each treatment session, and shows compliance with [facility]. At this time patient has exhibited good improvement, with slight functional strength deficits noted therefore would benefit from two more weeks of skilled intervention to discharge to [facility] at that time.    Goals  Short term goals: 4 weeks  1. Improve walking tolerance to 30 minutes to perform household activity  2. Patient to be able to negotiate 13 stairs with pain 2/10 pain or less  3. Patient will be able to perform sit to stand transfers from low chair without increased pain  4. Patient to reduce pain to 2/10 at its worst to improve activity tolerance    Long term goals: 12 weeks  1. Improve walking tolerance to 60 minutes   2. Patient to improve knee flexion to 135 degrees to improve stair negotiation  3. Patient to improve proximal hip strength to 5/5 to improve  knee stability  4. Patient to reduce pain to 1/10 at its worst to improve QOL and return to function      Plan  Patient would benefit from: skilled physical therapy  Planned modality interventions: TENS, unattended electrical stimulation, thermotherapy: hydrocollator packs and cryotherapy    Planned therapy interventions: abdominal trunk stabilization, flexibility, functional ROM exercises, home exercise program, therapeutic exercise, therapeutic activities, stretching, strengthening, self care, postural training, patient education, neuromuscular re-education, massage, manual therapy and joint mobilization    Frequency: 2x week  Duration in weeks: 12  Treatment plan discussed with: patient  Plan details: Discharge to [facility] in 2 weeks    Subjective Evaluation    History of Present Illness  Mechanism of injury: Initial evaluation: Patient reports a chronic history of right knee pain that began years ago noting that it may have come on from working as a  for years at a time. Patient reports about 1-2 months ago he bumped his knee that seemed to flair up his symptoms. Patient states that he has pain with transitions from sit to stand, driving, sleeping on his side. Patient notes that he did get an x-ray that showed mild degenerative changes and his orthopedic doctor referred him to physical therapy.    Re-evaluation: Patient reports since initial evaluation he has had a good improvement in range of motion, strength as well as a decreased pain. Patient notes at this time he no longer gets sharp pains, and rates his pain at 1/10 at its worst. Patient notes overall he is pleased with his progress. Patient states he is able to sleep through the night at this time without trouble.          Recurrent probem    Patient Goals  Patient goals for therapy: decreased pain, increased motion and increased strength  Patient goal: Sleep through the night, perfrom stairs  Pain  Current pain ratin  At best pain ratin  At  worst pain ratin  Quality: discomfort and dull ache  Alleviating factors: Heat, ice, brace.  Exacerbated by: Transitions, stairs, extended walking.  Progression: improved    Social Support  Steps to enter house: yes  Stairs in house: yes   Lives in: multiple-level home    Employment status: not working    Diagnostic Tests  X-ray: abnormal (Degenerative changes)  Treatments  No previous or current treatments      Objective    MMT:    Right  Left    Hip Flexion:   4+/5  5/5  Hip Abduction:   4/5  4/5  Hip Adduction:   4+/5  4+/5  Hip Extension:   NT/5  NT/5  Knee Flexion:   5/5  5/5  Knee Extension:  5/5  5/5  Ankle Dorsiflexion:  5/5  5/5  Ankle Plantarflexion:  5/5  5/5    AROM:   Right  Left    Knee Flexion:   130  135  Knee Extension:  -2  0    AMBULATION:  Slightly antalgic    PALPATION:  Patellar hypomobility    FLEXIBILITY:   Quadriceps     SPECIAL TESTS:  Lachmans: (-)  Anterior Draw: (-)  Posterior Lag: (-)  McMurrays: (-)  Thessley: (-)  Patellar Grind: (-)  Step down: (+) R  Valgus stress test: (-)         Precautions:   Past Medical History:   Diagnosis Date   • Elevated pancreatic enzyme    • Erectile dysfunction    • Fatty liver    • Gall bladder stones    • Hyperlipidemia    • Hypertension    • Obesity, Class II, BMI 35.0-39.9, with comorbidity (see actual BMI)     last assessed 14         Date: 2024   Visit #/  authorized 7/--- 8/--- 3/--- 4 5/ FOTO   Auth exp date        FOTO:                Manuals        Patellar mobilization   TC     Quad stretch AD (prone) TC supine TC in left sidelying  AD (prone)                    Neuro Re-Ed        Quad set         SLR flex/abd With #2 20x flex  With #0 20x abd With #2 20x flex  With #0 20x abd With #2 20x flex  With #0 20x abd  With #2 20x flex  With #0 20x abd   Bridging 2x10 reps w/ add squeeze 2x10 reps w/ add squeeze 2x10 reps w/ add squeeze  2x10 reps w/ add squeeze   Clamshells GTB  GTB   "Sidelying 20x R/L with GTB     LAQ #4 R/L 2x10 #4 R/L 2x10 #4 R 2x10  -----   Bal cushion squats ----   HHA 20x    TRX squats 20x 20x  20x    Ther Ex        Bike Bike 10 min L5 Bike 10 min L5 5 min start of session 10m NuStep 10 min L1    STS From 19\" mat  20x with arms crossed From 19\" mat  20x with arms crossed From 20\" 20x with arms crossed 20x 18\" From 19\" mat  20x with arms crossed   Heel raises From 1/2 foam 20x From 1/2 foam 20x From 1/2 foam 20x  From 1/2 foam 20x   Leg press #105 bilater 20x  #75 unilat 20x  Seat # 8 #105 bilater 20x  #75 unilat 20x  Seat # 8  #95 bilater 20x  #75 unilat 20x #95 bilater 20x  #75 unilat 20x  Seat # 11   FSU/LSU 2x10 each at 8\" step 2x10 each at 8\" step 2x10 each at 6\" step 8\" 2x20 2x10 each at 8\" step   Quad stretch manual TC Prone 10 x 10 sec   manual   Hamstring stretch  RLE 30\" x 3                                Ther Activity                        Gait Training                        Modalities                          Access Code: [email or web address]  URL: [email or web address]  Date: 11/04/2024  Prepared by: Tommy Caro    Exercises  - Sidelying Quadriceps Stretch  - 1 x daily - 5 x weekly - 4 sets - 30 sec hold  - Long Sitting Quad Set  - 1 x daily - 5 x weekly - 2 sets - 10 reps - 3-5 sec hold  - Seated Long Arc Quad  - 1 x daily - 5 x weekly - 2 sets - 10 reps  - Active Straight Leg Raise with Quad Set  - 1 x daily - 5 x weekly - 2 sets - 10 reps         "

## 2025-01-07 ENCOUNTER — OFFICE VISIT (OUTPATIENT)
Dept: PHYSICAL THERAPY | Facility: CLINIC | Age: 65
End: 2025-01-07
Payer: COMMERCIAL

## 2025-01-07 DIAGNOSIS — M17.11 PRIMARY OSTEOARTHRITIS OF RIGHT KNEE: Primary | ICD-10-CM

## 2025-01-07 PROCEDURE — 97112 NEUROMUSCULAR REEDUCATION: CPT

## 2025-01-07 PROCEDURE — 97110 THERAPEUTIC EXERCISES: CPT

## 2025-01-07 NOTE — PROGRESS NOTES
"Daily Note     Today's date: 2025  Patient name: Weston Polanco  : 1960  MRN: [id number]  Referring provider: Sherie Connors PA-C  Dx:   Encounter Diagnosis     ICD-10-CM    1. Primary osteoarthritis of right knee  M17.11           Start Time: 1058          Subjective: I helped my brother move yesterday & my back is a bit sore, but knees are ok.       Objective: See treatment diary below      Assessment: Tolerated treatment well. Patient demonstrated fatigue post treatment, exhibited good technique with therapeutic exercises, and would benefit from continued PT      Plan: Progress treatment as tolerated.       Precautions:   Past Medical History:   Diagnosis Date    Elevated pancreatic enzyme     Erectile dysfunction     Fatty liver     Gall bladder stones     Hyperlipidemia     Hypertension     Obesity, Class II, BMI 35.0-39.9, with comorbidity (see actual BMI)     last assessed 14         Date: 2024   Visit #/  authorized 7/--- 8/--- 9/--- 4 5/ FOTO   Auth exp date        FOTO:                Manuals        Patellar mobilization   AD     Quad stretch AD (prone) TC supine AD in prone  AD (prone)                    Neuro Re-Ed        Quad set         SLR flex/abd With #2 20x flex  With #0 20x abd With #2 20x flex  With #0 20x abd With #2 20x flex  With #0 20x abd  With #2 20x flex  With #0 20x abd   Bridging 2x10 reps w/ add squeeze 2x10 reps w/ add squeeze 2x10 reps w/ add squeeze  2x10 reps w/ add squeeze   Clamshells GTB 20/20 GTB 20/20 Sidelying 20x R/L with GTB     LAQ #4 R/L 2x10 #4 R/L 2x10 #4 R 2x10  -----   Bal cushion squats ----   HHA 20x    TRX squats 20x 20x  20x    Ther Ex        Bike Bike 10 min L5 Bike 10 min L5 Bike 10 min L7 10m NuStep 10 min L1    STS From 19\" mat  20x with arms crossed From 19\" mat  20x with arms crossed From 20\" 20x with arms crossed 20x 18\" From 19\" mat  20x with arms crossed   Heel raises From  foam 20x From  " "foam 20x From 1/2 foam 20x  From 1/2 foam 20x   Leg press #105 bilater 20x  #75 unilat 20x  Seat # 8 #105 bilater 20x  #75 unilat 20x  Seat # 8 #115 bilater 20x  #75 unilat 20x  Seat # 8 #95 bilater 20x  #75 unilat 20x #95 bilater 20x  #75 unilat 20x  Seat # 11   FSU/LSU 2x10 each at 8\" step 2x10 each at 8\" step 2x10 each at 6\" step 8\" 2x20 2x10 each at 8\" step   Quad stretch manual TC Prone 10 x 10 sec   manual   Hamstring stretch  RLE 30\" x 3 ----                               Ther Activity                        Gait Training                          Modalities                          Access Code: [email or web address]  URL: [email or web address]  Date: 11/04/2024  Prepared by: Tommy Caro    Exercises  - Sidelying Quadriceps Stretch  - 1 x daily - 5 x weekly - 4 sets - 30 sec hold  - Long Sitting Quad Set  - 1 x daily - 5 x weekly - 2 sets - 10 reps - 3-5 sec hold  - Seated Long Arc Quad  - 1 x daily - 5 x weekly - 2 sets - 10 reps  - Active Straight Leg Raise with Quad Set  - 1 x daily - 5 x weekly - 2 sets - 10 reps         "

## 2025-01-09 ENCOUNTER — OFFICE VISIT (OUTPATIENT)
Dept: PHYSICAL THERAPY | Facility: CLINIC | Age: 65
End: 2025-01-09
Payer: COMMERCIAL

## 2025-01-09 DIAGNOSIS — M17.11 PRIMARY OSTEOARTHRITIS OF RIGHT KNEE: Primary | ICD-10-CM

## 2025-01-09 PROCEDURE — 97110 THERAPEUTIC EXERCISES: CPT | Performed by: PHYSICAL THERAPIST

## 2025-01-09 PROCEDURE — 97112 NEUROMUSCULAR REEDUCATION: CPT | Performed by: PHYSICAL THERAPIST

## 2025-01-09 NOTE — PROGRESS NOTES
"Daily Note     Today's date: 2025  Patient name: Weston Polanco  : 1960  MRN: [id number]  Referring provider: Sherie Connors PA-C  Dx:   Encounter Diagnosis     ICD-10-CM    1. Primary osteoarthritis of right knee  M17.11                      Subjective: Overall my knee is much better      Objective: See treatment diary below      Assessment: Tolerated treatment well. Patient demonstrated fatigue post treatment and exhibited good technique with therapeutic exercises      Plan: Continue per plan of care.      Precautions:   Past Medical History:   Diagnosis Date    Elevated pancreatic enzyme     Erectile dysfunction     Fatty liver     Gall bladder stones     Hyperlipidemia     Hypertension     Obesity, Class II, BMI 35.0-39.9, with comorbidity (see actual BMI)     last assessed 14         Date: 2024   Visit #/  authorized 7/--- /--- /--- 10/12 5/ FOTO   Auth exp date        FOTO:                Manuals        Patellar mobilization   AD     Quad stretch AD (prone) TC supine AD in prone MV AD (prone)                    Neuro Re-Ed        Quad set         SLR flex/abd With #2 20x flex  With #0 20x abd With #2 20x flex  With #0 20x abd With #2 20x flex  With #0 20x abd [place] walkouts #12 4x10 With #2 20x flex  With #0 20x abd   Bridging 2x10 reps w/ add squeeze 2x10 reps w/ add squeeze 2x10 reps w/ add squeeze  2x10 reps w/ add squeez   Clamshells GTB 20/20 GTB 20/20 Sidelying 20x R/L with GTB     LAQ #4 R/L 2x10 #4 R/L 2x10 #4 R 2x10  -----   Bal cushion squats ----   HHA 20x    TRX squats 20x 20x  20x    Ther Ex        Bike Bike 10 min L5 Bike 10 min L5 Bike 10 min L7 10m NuStep 10 min L1    STS From 19\" mat  20x with arms crossed From 19\" mat  20x with arms crossed From 20\" 20x with arms crossed 20x 18\" w 30 lb aqua bag From 19\" mat  20x with arms crossed   Heel raises From 1/2 foam 20x From 1/2 foam 20x From 1/2 foam 20x 20x From 1/2 foam 20x   Leg " "press #105 bilater 20x  #75 unilat 20x  Seat # 8 #105 bilater 20x  #75 unilat 20x  Seat # 8 #115 bilater 20x  #75 unilat 20x  Seat # 8 #115 bilater 20x  #75 unilat 20x #95 bilater 20x  #75 unilat 20x  Seat # 11   FSU/LSU 2x10 each at 8\" step 2x10 each at 8\" step 2x10 each at 6\" step 8\" 2x20 2x10 each at 8\" step   Quad stretch manual TC Prone 10 x 10 sec   manual   Hamstring stretch  RLE 30\" x 3 ----                               Ther Activity                        Gait Training                          Modalities                          Access Code: [email or web address]  URL: [email or web address]  Date: 11/04/2024  Prepared by: Tommy Caro    Exercises  - Sidelying Quadriceps Stretch  - 1 x daily - 5 x weekly - 4 sets - 30 sec hold  - Long Sitting Quad Set  - 1 x daily - 5 x weekly - 2 sets - 10 reps - 3-5 sec hold  - Seated Long Arc Quad  - 1 x daily - 5 x weekly - 2 sets - 10 reps  - Active Straight Leg Raise with Quad Set  - 1 x daily - 5 x weekly - 2 sets - 10 reps           "

## 2025-01-14 ENCOUNTER — OFFICE VISIT (OUTPATIENT)
Dept: PHYSICAL THERAPY | Facility: CLINIC | Age: 65
End: 2025-01-14
Payer: COMMERCIAL

## 2025-01-14 DIAGNOSIS — M17.11 PRIMARY OSTEOARTHRITIS OF RIGHT KNEE: Primary | ICD-10-CM

## 2025-01-14 PROCEDURE — 97110 THERAPEUTIC EXERCISES: CPT

## 2025-01-14 PROCEDURE — 97112 NEUROMUSCULAR REEDUCATION: CPT

## 2025-01-14 NOTE — PROGRESS NOTES
"Daily Note     Today's date: 2025  Patient name: Weston Polanco  : 1960  MRN: [id number]  Referring provider: Sherie Connors PA-C  Dx:   Encounter Diagnosis     ICD-10-CM    1. Primary osteoarthritis of right knee  M17.11                      Subjective: My knee is a little sore from driving to [place], but overall I'm feeling stronger & more balanced on my feet.       Objective: See treatment diary below      Assessment: Tolerated treatment well. Patient demonstrated fatigue post treatment and exhibited good technique with therapeutic exercises      Plan: Potential discharge next visit.     Precautions:   Past Medical History:   Diagnosis Date    Elevated pancreatic enzyme     Erectile dysfunction     Fatty liver     Gall bladder stones     Hyperlipidemia     Hypertension     Obesity, Class II, BMI 35.0-39.9, with comorbidity (see actual BMI)     last assessed 14         Date: 2024   Visit #/  authorized 7/--- /--- /--- 10/12 11/12   Auth exp date        FOTO:                Manuals        Patellar mobilization   AD     Quad stretch AD (prone) TC supine AD in prone MV ----                   Neuro Re-Ed        Quad set         SLR flex/abd With #2 20x flex  With #0 20x abd With #2 20x flex  With #0 20x abd With #2 20x flex  With #0 20x abd [place] walkouts #12 4x10 With #2 20x flex  With #0 20x abd   Bridging 2x10 reps w/ add squeeze 2x10 reps w/ add squeeze 2x10 reps w/ add squeeze  2x10 reps w/ add squeeze   Clamshells GTB 20/20 GTB 20/20 Sidelying 20x R/L with GTB     LAQ #4 R/L 2x10 #4 R/L 2x10 #4 R 2x10  -----   Bal cushion squats ----   HHA 20x    TRX squats 20x 20x  20x    Ther Ex        Bike Bike 10 min L5 Bike 10 min L5 Bike 10 min L7 10m Bike 10 min L7 seat#9   STS From 19\" mat  20x with arms crossed From 19\" mat  20x with arms crossed From 20\" 20x with arms crossed 20x 18\" w 30 lb aqua bag From regular chair + aeromat  2x10 reps  with arms crossed " "  Heel raises From 1/2 foam 20x From 1/2 foam 20x From 1/2 foam 20x 20x From 1/2 foam 20x   Leg press #105 bilater 20x  #75 unilat 20x  Seat # 8 #105 bilater 20x  #75 unilat 20x  Seat # 8 #115 bilater 20x  #75 unilat 20x  Seat # 8 #115 bilater 20x  #75 unilat 20x #105 bilater 20x  #85 unilat 20x  Seat # 11   FSU/LSU 2x10 each at 8\" step 2x10 each at 8\" step 2x10 each at 6\" step 8\" 2x20 2x10 each at 8\" step   Quad stretch manual TC Prone 10 x 10 sec   Prone R/L 10x 10 sec hold using SOS    Hamstring stretch  RLE 30\" x 3 ----                               Ther Activity                        Gait Training                          Modalities                          Access Code: [email or web address]  URL: [email or web address]  Date: 11/04/2024  Prepared by: Tommy Caro    Exercises  - Sidelying Quadriceps Stretch  - 1 x daily - 5 x weekly - 4 sets - 30 sec hold  - Long Sitting Quad Set  - 1 x daily - 5 x weekly - 2 sets - 10 reps - 3-5 sec hold  - Seated Long Arc Quad  - 1 x daily - 5 x weekly - 2 sets - 10 reps  - Active Straight Leg Raise with Quad Set  - 1 x daily - 5 x weekly - 2 sets - 10 reps             "

## 2025-01-16 ENCOUNTER — OFFICE VISIT (OUTPATIENT)
Dept: PHYSICAL THERAPY | Facility: CLINIC | Age: 65
End: 2025-01-16
Payer: COMMERCIAL

## 2025-01-16 DIAGNOSIS — M17.11 PRIMARY OSTEOARTHRITIS OF RIGHT KNEE: Primary | ICD-10-CM

## 2025-01-16 PROCEDURE — 97112 NEUROMUSCULAR REEDUCATION: CPT | Performed by: PHYSICAL THERAPIST

## 2025-01-16 PROCEDURE — 97110 THERAPEUTIC EXERCISES: CPT | Performed by: PHYSICAL THERAPIST

## 2025-01-16 NOTE — PROGRESS NOTES
"PT Discharge    Today's date: 2025  Patient name: Weston Polanco  : 1960  MRN: [id number]  Referring provider: Sherie Connors PA-C  Dx:   Encounter Diagnosis     ICD-10-CM    1. Primary osteoarthritis of right knee  M17.11                      Assessment    Assessment details: Weston Polanco has been seen for Primary osteoarthritis of right knee  (primary encounter diagnosis),and has met the goals of physical therapy.And is independent with a HEP.           Subjective Evaluation    History of Present Illness  Mechanism of injury: My knees feel significantly less pain, with the strengthening exercise.  Pain  Current pain ratin  At best pain ratin  At worst pain ratin          Objective     Strength/Myotome Testing     Left Knee   Flexion: 5  Extension: 5    Right Knee   Flexion: 5  Extension: 5            Date: 2024   Visit #/  authorized 7/--- 8/--- 9/--- 10/12 12/12   Auth exp date        FOTO:                Manuals        Patellar mobilization   AD     Quad stretch AD (prone) TC supine AD in prone MV ----                   Neuro Re-Ed        Quad set         SLR flex/abd With #2 20x flex  With #0 20x abd With #2 20x flex  With #0 20x abd With #2 20x flex  With #0 20x abd [place] walkouts #12 4x10 With #2 20x flex  With #0 20x abd   Bridging 2x10 reps w/ add squeeze 2x10 reps w/ add squeeze 2x10 reps w/ add squeeze  2x10 reps w/ add squeeze   Clamshells GTB 20/20 GTB 20/20 Sidelying 20x R/L with GTB     LAQ #4 R/L 2x10 #4 R/L 2x10 #4 R 2x10  -----   Bal cushion squats ----   HHA 20x    TRX squats 20x 20x  20x    Ther Ex        Bike Bike 10 min L5 Bike 10 min L5 Bike 10 min L7 10m Bike 10 min L7 seat#9   STS From 19\" mat  20x with arms crossed From 19\" mat  20x with arms crossed From 20\" 20x with arms crossed 20x 18\" w 30 lb aqua bag From regular chair + aeromat  2x10 reps  with arms crossed   Heel raises From 1/2 foam 20x From 1/2 foam 20x " "From 1/2 foam 20x 20x From 1/2 foam 20x   Leg press #105 bilater 20x  #75 unilat 20x  Seat # 8 #105 bilater 20x  #75 unilat 20x  Seat # 8 #115 bilater 20x  #75 unilat 20x  Seat # 8 #115 bilater 20x  #75 unilat 20x #105 bilater 20x  #85 unilat 20x  Seat # 11   FSU/LSU 2x10 each at 8\" step 2x10 each at 8\" step 2x10 each at 6\" step 8\" 2x20 2x10 each at 8\" step   Quad stretch manual TC Prone 10 x 10 sec   Prone R/L 10x 10 sec hold using SOS    Hamstring stretch  RLE 30\" x 3 ----                               Ther Activity                        Gait Training                          Modalities                          Access Code: [email or web address]  URL: [email or web address]  Date: 11/04/2024  Prepared by: Tommy Caro    Exercises  - Sidelying Quadriceps Stretch  - 1 x daily - 5 x weekly - 4 sets - 30 sec hold  - Long Sitting Quad Set  - 1 x daily - 5 x weekly - 2 sets - 10 reps - 3-5 sec hold  - Seated Long Arc Quad  - 1 x daily - 5 x weekly - 2 sets - 10 reps  - Active Straight Leg Raise with Quad Set  - 1 x daily - 5 x weekly - 2 sets - 10 reps               "

## 2025-01-17 ENCOUNTER — APPOINTMENT (OUTPATIENT)
Dept: PHYSICAL THERAPY | Facility: CLINIC | Age: 65
End: 2025-01-17
Payer: COMMERCIAL

## 2025-03-05 ENCOUNTER — TELEMEDICINE (OUTPATIENT)
Dept: FAMILY MEDICINE CLINIC | Facility: CLINIC | Age: 65
End: 2025-03-05
Payer: COMMERCIAL

## 2025-03-05 VITALS — SYSTOLIC BLOOD PRESSURE: 120 MMHG | DIASTOLIC BLOOD PRESSURE: 80 MMHG

## 2025-03-05 DIAGNOSIS — N52.9 ERECTILE DYSFUNCTION, UNSPECIFIED ERECTILE DYSFUNCTION TYPE: ICD-10-CM

## 2025-03-05 DIAGNOSIS — R19.5 CHANGE IN STOOL: ICD-10-CM

## 2025-03-05 DIAGNOSIS — Z12.11 COLON CANCER SCREENING: Primary | ICD-10-CM

## 2025-03-05 PROCEDURE — 99213 OFFICE O/P EST LOW 20 MIN: CPT | Performed by: FAMILY MEDICINE

## 2025-03-05 RX ORDER — TADALAFIL 10 MG/1
10 TABLET ORAL DAILY PRN
Qty: 10 TABLET | Refills: 0 | Status: SHIPPED | OUTPATIENT
Start: 2025-03-05

## 2025-03-05 NOTE — PROGRESS NOTES
Virtual Regular VisitName: Weston Polanco      : 1960      MRN: [id number]  Encounter Provider: Margy Godoy MD  Encounter Date: 3/5/2025   Encounter department: [facility]  :  Assessment & Plan  Colon cancer screening    Orders:  •  Cologuard    Change in stool         Erectile dysfunction, unspecified erectile dysfunction type    Orders:  •  tadalafil (CIALIS) 10 MG tablet; Take 1 tablet (10 mg total) by mouth daily as needed for erectile dysfunction      History of Present Illness     HPI  Change in stool consistency  No melena  No n/v/fever or abd pain  Needs order for cologuard  +ED-req med    Review of Systems  Per hpi  Objective   /80     Physical Exam  AAO3. NAD  Administrative Statements   Encounter provider Margy Godoy MD    The Patient is located at Home and in the following state in which I hold an active license [place].    The patient was identified by name and date of birth. Weston Polanco was informed that this is a telemedicine visit and that the visit is being conducted through the Epic Embedded platform. He agrees to proceed..  My office door was closed. No one else was in the room.  He acknowledged consent and understanding of privacy and security of the video platform. The patient has agreed to participate and understands they can discontinue the visit at any time.    I have spent a total time of 15 minutes in caring for this patient on the day of the visit/encounter including Instructions for management, Counseling / Coordination of care, and Documenting in the medical record, not including the time spent for establishing the audio/video connection.

## 2025-03-12 NOTE — PROGRESS NOTES
Virtual Regular VisitName: Weston Polanco      : 1960      MRN: [id number]  Encounter Provider: Margy Godoy MD  Encounter Date: 3/5/2025   Encounter department: [facility]  :  Assessment & Plan  Colon cancer screening    Orders:  •  Cologuard    Change in stool         Erectile dysfunction, unspecified erectile dysfunction type    Orders:  •  tadalafil (CIALIS) 10 MG tablet; Take 1 tablet (10 mg total) by mouth daily as needed for erectile dysfunction    Colon cancer screening         Change in stool         Erectile dysfunction, unspecified erectile dysfunction type               History of Present Illness     HPI  Review of Systems    Objective   /80     Physical Exam    Administrative Statements   Encounter provider Margy Godoy MD    The Patient is located at Home and in the following state in which I hold an active license [place].    The patient was identified by name and date of birth. Weston Polanco was informed that this is a telemedicine visit and that the visit is being conducted through the Epic Embedded platform. He agrees to proceed..  My office door was closed. No one else was in the room.  He acknowledged consent and understanding of privacy and security of the video platform. The patient has agreed to participate and understands they can discontinue the visit at any time.    I have spent a total time of 15 minutes in caring for this patient on the day of the visit/encounter including Instructions for management, Counseling / Coordination of care, and Documenting in the medical record, not including the time spent for establishing the audio/video connection.

## 2025-03-12 NOTE — ASSESSMENT & PLAN NOTE
Orders:  •  tadalafil (CIALIS) 10 MG tablet; Take 1 tablet (10 mg total) by mouth daily as needed for erectile dysfunction

## 2025-03-31 DIAGNOSIS — I10 ESSENTIAL HYPERTENSION: ICD-10-CM

## 2025-03-31 DIAGNOSIS — E11.9 TYPE 2 DIABETES MELLITUS WITHOUT COMPLICATION, WITHOUT LONG-TERM CURRENT USE OF INSULIN (HCC): ICD-10-CM

## 2025-03-31 RX ORDER — LISINOPRIL 2.5 MG/1
2.5 TABLET ORAL DAILY
Qty: 90 TABLET | Refills: 1 | Status: SHIPPED | OUTPATIENT
Start: 2025-03-31

## 2025-05-15 DIAGNOSIS — N52.9 ERECTILE DYSFUNCTION, UNSPECIFIED ERECTILE DYSFUNCTION TYPE: ICD-10-CM

## 2025-05-15 DIAGNOSIS — E11.9 TYPE 2 DIABETES MELLITUS WITHOUT COMPLICATION, WITHOUT LONG-TERM CURRENT USE OF INSULIN (HCC): ICD-10-CM

## 2025-05-16 RX ORDER — METFORMIN HYDROCHLORIDE 500 MG/1
500 TABLET, EXTENDED RELEASE ORAL
Qty: 90 TABLET | Refills: 1 | Status: SHIPPED | OUTPATIENT
Start: 2025-05-16

## 2025-05-16 RX ORDER — TADALAFIL 10 MG/1
10 TABLET ORAL DAILY PRN
Qty: 10 TABLET | Refills: 3 | Status: SHIPPED | OUTPATIENT
Start: 2025-05-16

## 2025-05-27 ENCOUNTER — TELEPHONE (OUTPATIENT)
Dept: ADMINISTRATIVE | Facility: OTHER | Age: 65
End: 2025-05-27

## 2025-05-27 NOTE — LETTER
Diabetic Eye Exam Form    Date Requested: 25  Patient: Weston Polanco  Patient : 1960   Referring Provider: Margy Godoy MD      DIABETIC Eye Exam Date _______________________________      Type of Exam MUST be documented for Diabetic Eye Exams. Please CHECK ONE.     Retinal Exam       Dilated Retinal Exam       OCT       Optomap-Iris Exam      Fundus Photography       Left Eye - Please check Retinopathy or No Retinopathy        Exam did show retinopathy    Exam did not show retinopathy       Right Eye - Please check Retinopathy or No Retinopathy       Exam did show retinopathy    Exam did not show retinopathy       Comments _____Within 2024  _____________________________________________________    Practice Providing Exam ______________________________________________    Exam Performed By (print name) _______________________________________      Provider Signature ___________________________________________________      These reports are needed for  compliance.    Please fax this completed form and a copy of the Diabetic Eye Exam report to the [facility] Based Department as soon as possible via Fax [phone], attention Samantha: Phone [phone]. Our office is located at [address] [place] [address].     We thank you for your assistance in treating our mutual patient.

## 2025-05-27 NOTE — TELEPHONE ENCOUNTER
----- Message from Stella SON sent at 5/23/2025 12:10 PM EDT -----  Regarding: care gap request  05/23/25 12:10 PMHello, our patient attached above has had Diabetic Eye Exam completed/performed. Please assist in updating the patient chart by making an External outreach to [facility]  fac[facility] located in [facility]. The date of service is 2024.Thank you,Stella REED

## 2025-05-27 NOTE — LETTER
Diabetic Eye Exam Form    Date Requested: 25  Patient: Weston Polanco  Patient : 1960   Referring Provider: Margy Godoy MD      DIABETIC Eye Exam Date _______________________________      Type of Exam MUST be documented for Diabetic Eye Exams. Please CHECK ONE.     Retinal Exam       Dilated Retinal Exam       OCT       Optomap-Iris Exam      Fundus Photography       Left Eye - Please check Retinopathy or No Retinopathy        Exam did show retinopathy    Exam did not show retinopathy       Right Eye - Please check Retinopathy or No Retinopathy       Exam did show retinopathy    Exam did not show retinopathy       Comments __Within   ________________________________________________________    Practice Providing Exam ______________________________________________    Exam Performed By (print name) _______________________________________      Provider Signature ___________________________________________________      These reports are needed for  compliance.    Please fax this completed form and a copy of the Diabetic Eye Exam report to the [facility] Based Department as soon as possible via Fax [phone], attention Samantha: Phone [phone]. Our office is located at [address] [place] [address].     We thank you for your assistance in treating our mutual patient.

## 2025-05-27 NOTE — TELEPHONE ENCOUNTER
Upon review of the In Basket request and the patient's chart, initial outreach has been made via fax to facility. Please see Contacts section for details.     Thank you  Samantha Reeves MA

## 2025-05-27 NOTE — LETTER
Diabetic Eye Exam Form    Date Requested: 25  Patient: Weston Polanco  Patient : 1960   Referring Provider: Margy Godoy MD      DIABETIC Eye Exam Date _______________________________      Type of Exam MUST be documented for Diabetic Eye Exams. Please CHECK ONE.     Retinal Exam       Dilated Retinal Exam       OCT       Optomap-Iris Exam      Fundus Photography       Left Eye - Please check Retinopathy or No Retinopathy        Exam did show retinopathy    Exam did not show retinopathy       Right Eye - Please check Retinopathy or No Retinopathy       Exam did show retinopathy    Exam did not show retinopathy       Comments _______Within 2024  ___________________________________________________    Practice Providing Exam ______________________________________________    Exam Performed By (print name) _______________________________________      Provider Signature ___________________________________________________      These reports are needed for  compliance.    Please fax this completed form and a copy of the Diabetic Eye Exam report to the [facility] Based Department as soon as possible via Fax [phone], attention Samantha: Phone [phone]. Our office is located at [address] [place] [address].     We thank you for your assistance in treating our mutual patient.

## 2025-06-02 ENCOUNTER — OFFICE VISIT (OUTPATIENT)
Dept: FAMILY MEDICINE CLINIC | Facility: CLINIC | Age: 65
End: 2025-06-02
Payer: COMMERCIAL

## 2025-06-02 VITALS
SYSTOLIC BLOOD PRESSURE: 134 MMHG | OXYGEN SATURATION: 97 % | WEIGHT: 228.8 LBS | HEIGHT: 70 IN | BODY MASS INDEX: 32.75 KG/M2 | DIASTOLIC BLOOD PRESSURE: 94 MMHG | RESPIRATION RATE: 16 BRPM | TEMPERATURE: 96.2 F | HEART RATE: 109 BPM

## 2025-06-02 DIAGNOSIS — E78.2 MIXED HYPERLIPIDEMIA: ICD-10-CM

## 2025-06-02 DIAGNOSIS — I10 ESSENTIAL HYPERTENSION: ICD-10-CM

## 2025-06-02 DIAGNOSIS — E11.65 UNCONTROLLED DIABETES MELLITUS WITH HYPERGLYCEMIA, WITHOUT LONG-TERM CURRENT USE OF INSULIN (HCC): Primary | ICD-10-CM

## 2025-06-02 LAB
LEFT EYE DIABETIC RETINOPATHY: ABNORMAL
LEFT EYE IMAGE QUALITY: ABNORMAL
LEFT EYE MACULAR EDEMA: ABNORMAL
LEFT EYE OTHER RETINOPATHY: ABNORMAL
RIGHT EYE DIABETIC RETINOPATHY: ABNORMAL
RIGHT EYE IMAGE QUALITY: ABNORMAL
RIGHT EYE MACULAR EDEMA: ABNORMAL
RIGHT EYE OTHER RETINOPATHY: ABNORMAL
SEVERITY (EYE EXAM): ABNORMAL
SL AMB POCT HEMOGLOBIN AIC: 11.7 (ref ?–6.5)

## 2025-06-02 PROCEDURE — 99214 OFFICE O/P EST MOD 30 MIN: CPT | Performed by: FAMILY MEDICINE

## 2025-06-02 PROCEDURE — 83036 HEMOGLOBIN GLYCOSYLATED A1C: CPT | Performed by: FAMILY MEDICINE

## 2025-06-02 RX ORDER — TIRZEPATIDE 2.5 MG/.5ML
2.5 INJECTION, SOLUTION SUBCUTANEOUS WEEKLY
Qty: 2 ML | Refills: 0 | Status: SHIPPED | OUTPATIENT
Start: 2025-06-02 | End: 2025-06-05 | Stop reason: ALTCHOICE

## 2025-06-02 RX ORDER — ATORVASTATIN CALCIUM 10 MG/1
10 TABLET, FILM COATED ORAL DAILY
Qty: 90 TABLET | Refills: 1 | Status: SHIPPED | OUTPATIENT
Start: 2025-06-02

## 2025-06-02 NOTE — ASSESSMENT & PLAN NOTE
BP borderline high  Readdressed lifestyle modifications  Orders:  •  Comprehensive metabolic panel; Future

## 2025-06-02 NOTE — PROGRESS NOTES
"Name: Weston Polanco      : 1960      MRN: [id number]  Encounter Provider: Margy Godoy MD  Encounter Date: 2025   Encounter department: [facility]  :  Assessment & Plan  Uncontrolled diabetes mellitus with hyperglycemia, without long-term current use of insulin (HCC)  D/w pt and wife need for tighter DM control  Will start trial of Mounjaro  Addtl labs ordered  Encouraged compliance w ADA diet/daily exercise  Diabetic eye exam to be done today in office  Lab Results   Component Value Date    HGBA1C 11.7 (A) 2025       Orders:  •  POCT hemoglobin A1c  •  Tirzepatide (Mounjaro) 2.5 MG/0.5ML SOAJ; Inject 2.5 mg under the skin once a week  •  Hemoglobin A1C; Future  •  Comprehensive metabolic panel; Future  •  Albumin / creatinine urine ratio; Future  •  IRIS Diabetic eye exam    Essential hypertension  BP borderline high  Readdressed lifestyle modifications  Orders:  •  Comprehensive metabolic panel; Future    Mixed hyperlipidemia  Low chol diet  Orders:  •  atorvastatin (LIPITOR) 10 mg tablet; Take 1 tablet (10 mg total) by mouth daily  •  Lipid Panel with Direct LDL reflex; Future    BMI 32.0-32.9,adult              History of Present Illness   HPI  Review of Systems   Constitutional:  Positive for fatigue.   Eyes:  Positive for visual disturbance.   Respiratory: Negative.     Cardiovascular: Negative.    Gastrointestinal: Negative.    Musculoskeletal:  Positive for arthralgias and myalgias.   Neurological: Negative.    Psychiatric/Behavioral: Negative.         Objective   /94 (BP Location: Left arm, Patient Position: Sitting, Cuff Size: Large)   Pulse (!) 109   Temp (!) 96.2 °F (35.7 °C) (Temporal)   Resp 16   Ht 5' 10\" (1.778 m)   Wt 104 kg (228 lb 12.8 oz)   SpO2 97%   BMI 32.83 kg/m²      Physical Exam  Vitals and nursing note reviewed.   Constitutional:       General: He is not in acute distress.     Comments: GAMA CURTIS:      Nose: Nose normal. "     Eyes:      General: No scleral icterus.     Conjunctiva/sclera: Conjunctivae normal.       Cardiovascular:      Rate and Rhythm: Normal rate and regular rhythm.      Pulses:           Dorsalis pedis pulses are 2+ on the right side and 2+ on the left side.        Posterior tibial pulses are 2+ on the right side and 2+ on the left side.   Pulmonary:      Effort: Pulmonary effort is normal. No respiratory distress.      Breath sounds: Normal breath sounds.   Abdominal:      General: Bowel sounds are normal.      Palpations: Abdomen is soft.      Tenderness: There is no abdominal tenderness. There is no right CVA tenderness or left CVA tenderness.     Musculoskeletal:         General: Tenderness present.      Cervical back: Neck supple. No tenderness.      Right lower leg: No edema.      Left lower leg: No edema.   Feet:      Right foot:      Skin integrity: Dry skin present. No ulcer, skin breakdown, erythema, warmth or callus.      Left foot:      Skin integrity: Dry skin present. No ulcer, skin breakdown, erythema, warmth or callus.     Skin:     General: Skin is warm and dry.     Neurological:      General: No focal deficit present.      Mental Status: He is alert and oriented to person, place, and time.      Cranial Nerves: No cranial nerve deficit.     Psychiatric:         Mood and Affect: Mood normal.

## 2025-06-02 NOTE — ASSESSMENT & PLAN NOTE
Low chol diet  Orders:  •  atorvastatin (LIPITOR) 10 mg tablet; Take 1 tablet (10 mg total) by mouth daily  •  Lipid Panel with Direct LDL reflex; Future

## 2025-06-03 ENCOUNTER — TELEPHONE (OUTPATIENT)
Age: 65
End: 2025-06-03

## 2025-06-03 NOTE — TELEPHONE ENCOUNTER
Reviewed--agree if pt able to tolerate the metformin (reported gi side effects) then continue until Munjaro started.

## 2025-06-03 NOTE — TELEPHONE ENCOUNTER
As a follow-up, a second attempt has been made for outreach via fax to facility. Please see Contacts section for details.    Thank you  Samantha Reeves MA

## 2025-06-03 NOTE — TELEPHONE ENCOUNTER
Patient notified of provider's response, patient acknowledged understanding of same. No further action needed. RH, RMA

## 2025-06-03 NOTE — TELEPHONE ENCOUNTER
With an A1c elevated. Yes I would recommend Metformin to be taken, and continue it even on the first 3 months of Mounjaro

## 2025-06-03 NOTE — TELEPHONE ENCOUNTER
Patient spouse called in stated waiting on Tirzepatide (Mounjaro) 2.5 MG/0.5ML SOAJ prior authorization to be approved and was told could take up to 72 hours patient spouse would christel to know if patient should continue to take the metformin due to sugars are getting high. Please advise. Thank you

## 2025-06-05 ENCOUNTER — TELEPHONE (OUTPATIENT)
Dept: FAMILY MEDICINE CLINIC | Facility: CLINIC | Age: 65
End: 2025-06-05

## 2025-06-05 DIAGNOSIS — E11.65 UNCONTROLLED DIABETES MELLITUS WITH HYPERGLYCEMIA, WITHOUT LONG-TERM CURRENT USE OF INSULIN (HCC): Primary | ICD-10-CM

## 2025-06-05 RX ORDER — LIRAGLUTIDE 6 MG/ML
INJECTION SUBCUTANEOUS
Qty: 6 ML | Refills: 0 | Status: SHIPPED | OUTPATIENT
Start: 2025-06-05 | End: 2025-07-09

## 2025-06-06 NOTE — TELEPHONE ENCOUNTER
As a final attempt, a third outreach has been made via fax to facility and telephone call to facility. Please see Contacts section for details. This encounter will be closed and completed by end of day. Should we receive the requested information because of previous outreach attempts, the requested patient's chart will be updated appropriately.   Spoke to the office requesting we refax form. I will refax.   Thank you  Samantha Reeves MA

## 2025-06-19 ENCOUNTER — TELEPHONE (OUTPATIENT)
Age: 65
End: 2025-06-19

## 2025-06-19 DIAGNOSIS — E08.65 DIABETES MELLITUS DUE TO UNDERLYING CONDITION, UNCONTROLLED, WITH HYPERGLYCEMIA (HCC): ICD-10-CM

## 2025-06-19 RX ORDER — BLOOD SUGAR DIAGNOSTIC
STRIP MISCELLANEOUS
Qty: 100 STRIP | Refills: 3 | Status: SHIPPED | OUTPATIENT
Start: 2025-06-19

## 2025-06-19 RX ORDER — BLOOD SUGAR DIAGNOSTIC
1 STRIP MISCELLANEOUS 2 TIMES DAILY
Qty: 100 STRIP | Refills: 3 | Status: SHIPPED | OUTPATIENT
Start: 2025-06-19 | End: 2025-06-19 | Stop reason: SDUPTHER

## 2025-06-19 RX ORDER — SYRING-NEEDL,DISP,INSUL,0.3 ML 30 GX5/16"
SYRINGE, EMPTY DISPOSABLE MISCELLANEOUS 2 TIMES DAILY
Qty: 1 EACH | Refills: 0 | Status: SHIPPED | OUTPATIENT
Start: 2025-06-19

## 2025-06-19 NOTE — TELEPHONE ENCOUNTER
Pharmacy called. One touch test strips ROBIN is not allowed, invalid per Insurance , also frequency of testing exceeds the limitations. Insurance states office needs to call [phone]

## 2025-06-19 NOTE — TELEPHONE ENCOUNTER
Patient needs refill on OneTouch ultra 2 test strips.  And Delica plus lancets.  He also needs a lancing device, his is broke.  Wife stated they received the wrong tests strips so it must say on Rx OneTouch ultra 2.  Patient tests once daily but sometimes he tests twice daily.

## 2025-06-19 NOTE — TELEPHONE ENCOUNTER
Please inform that I removed ROBIN and changed to once daily testing.  Let pt know he can vary the once daily testing--do some fasting and some evening readings on other days--thanks  Fasting goal for sugar reading=  If has eaten within couple of hours goal is 150-170.  thanks

## 2025-06-24 NOTE — TELEPHONE ENCOUNTER
Heaven from [facility] pharmacy calling to let office know that the One Touch Ultra test strips need a PRIOR  AUTH  Patient came into pharmacy angry and Heaven provided him with some test strips to hold him over until PA is determined    Prior Auth phone number: [phone]    Cover My Meds Key: [id number]    Please initiate prior auth

## 2025-06-24 NOTE — TELEPHONE ENCOUNTER
PA for glucose blood (OneTouch Ultra) test strip SUBMITTED to [employer]    via    [x]CMM-KEY: [id number]  []Surescripts-Case ID #    []Availity-Auth ID #  NDC #    []Faxed to plan   []Other website    []Phone call Case ID #      [x]PA sent as URGENT    All office notes, labs and other pertaining documents and studies sent. Clinical questions answered. Awaiting determination from insurance company.     Turnaround time for your insurance to make a decision on your Prior Authorization can take 7-21 business days.

## 2025-06-24 NOTE — TELEPHONE ENCOUNTER
Malik from [facility] prior authorization team called had questions in regards to authorization for one touch ultra. Answered questions.

## 2025-06-25 DIAGNOSIS — E11.65 UNCONTROLLED DIABETES MELLITUS WITH HYPERGLYCEMIA, WITHOUT LONG-TERM CURRENT USE OF INSULIN (HCC): ICD-10-CM

## 2025-06-26 NOTE — TELEPHONE ENCOUNTER
PA for glucose blood (OneTouch Ultra) test strip APPROVED     Date(s) approved 6/24/2025 - 6/23/2026    Case #     Patient advised by - patient has picked up medication.        []MyChart Message  []Phone call   []LMOM  []L/M to call office as no active Communication consent on file  []Unable to leave detailed message as VM not approved on Communication consent       Pharmacy advised by    [x]Fax  []Phone call  []Secure Chat    Specialty Pharmacy    []      Approval letter scanned into Media Yes

## 2025-06-27 ENCOUNTER — OFFICE VISIT (OUTPATIENT)
Dept: FAMILY MEDICINE CLINIC | Facility: CLINIC | Age: 65
End: 2025-06-27
Payer: COMMERCIAL

## 2025-06-27 VITALS
WEIGHT: 229 LBS | OXYGEN SATURATION: 97 % | TEMPERATURE: 97.6 F | HEART RATE: 76 BPM | RESPIRATION RATE: 14 BRPM | SYSTOLIC BLOOD PRESSURE: 132 MMHG | DIASTOLIC BLOOD PRESSURE: 90 MMHG | HEIGHT: 70 IN | BODY MASS INDEX: 32.78 KG/M2

## 2025-06-27 DIAGNOSIS — H53.9 VISUAL CHANGES: ICD-10-CM

## 2025-06-27 DIAGNOSIS — E11.65 UNCONTROLLED DIABETES MELLITUS WITH HYPERGLYCEMIA, WITHOUT LONG-TERM CURRENT USE OF INSULIN (HCC): Primary | ICD-10-CM

## 2025-06-27 DIAGNOSIS — I10 ESSENTIAL HYPERTENSION: ICD-10-CM

## 2025-06-27 DIAGNOSIS — E78.2 MIXED HYPERLIPIDEMIA: ICD-10-CM

## 2025-06-27 PROCEDURE — 99214 OFFICE O/P EST MOD 30 MIN: CPT | Performed by: FAMILY MEDICINE

## 2025-06-27 NOTE — PROGRESS NOTES
Name: Weston Polanco      : 1960      MRN: [id number]  Encounter Provider: Margy Godoy MD  Encounter Date: 2025   Encounter department: [facility]  :  Assessment & Plan  Uncontrolled diabetes mellitus with hyperglycemia, without long-term current use of insulin (HCC)  Did not try Victoza--had filled but unable to give himself injection  Advised to bring to office for first use-pt states would consider but would like new rx for oral medication to try first  Has started making changes w diet/exercise--has been able to bring down values in past--vut has never been this high w hga1c  Will cont t monitor on new rx  Stressed need for tighter glucose control--pt and wife verbalized understanding  Lab Results   Component Value Date    HGBA1C 11.7 (A) 2025       Orders:  •  Ambulatory Referral to Ophthalmology; Future  •  sitaGLIPtin (JANUVIA) 100 mg tablet; Take 1 tablet (100 mg total) by mouth daily    Visual changes    Orders:  •  Ambulatory Referral to Ophthalmology; Future    Essential hypertension  BP borderline hi diastolic  Encouraged compliance w meds/diet  Avoid nsaid/decongestant use  Liit sodiumcaffeine       Mixed hyperlipidemia  Cont. Statin/low chol diet       BMI 32.0-32.9,adult              History of Present Illness   HPI    In w wife, Lauren, for follow-up  Filled the victoza rx but has not been using--fears giving himself injection--req alternative oral medication  Just started making dietary and activity change  Diastolic bp borderline high  Denies cp/abd pain/focal weakness  Review of Systems   Constitutional:  Positive for fatigue.   Eyes:  Positive for visual disturbance.   Respiratory: Negative.     Cardiovascular: Negative.    Gastrointestinal: Negative.    Musculoskeletal:  Positive for arthralgias and myalgias.   Neurological: Negative.    Psychiatric/Behavioral: Negative.           Objective   /90 (BP Location: Left arm, Patient Position:  "Sitting, Cuff Size: Adult)   Pulse 76   Temp 97.6 °F (36.4 °C) (Temporal)   Resp 14   Ht 5' 10\" (1.778 m)   Wt 104 kg (229 lb)   SpO2 97%   BMI 32.86 kg/m²      Physical Exam  Vitals and nursing note reviewed.   Constitutional:       General: He is not in acute distress.     Comments: OW   HENT:      Nose: Nose normal.     Eyes:      General: No scleral icterus.     Conjunctiva/sclera: Conjunctivae normal.       Cardiovascular:      Rate and Rhythm: Normal rate and regular rhythm.      Pulses:           Dorsalis pedis pulses are 2+ on the right side and 2+ on the left side.        Posterior tibial pulses are 2+ on the right side and 2+ on the left side.   Pulmonary:      Effort: Pulmonary effort is normal. No respiratory distress.      Breath sounds: Normal breath sounds.   Abdominal:      General: Bowel sounds are normal.      Palpations: Abdomen is soft.      Tenderness: There is no abdominal tenderness. There is no right CVA tenderness or left CVA tenderness.     Musculoskeletal:         General: Tenderness present.      Cervical back: Neck supple. No tenderness.      Right lower leg: No edema.      Left lower leg: No edema.   Feet:      Right foot:      Skin integrity: Dry skin present. No ulcer, skin breakdown, erythema, warmth or callus.      Left foot:      Skin integrity: Dry skin present. No ulcer, skin breakdown, erythema, warmth or callus.     Skin:     General: Skin is warm and dry.     Neurological:      General: No focal deficit present.      Mental Status: He is alert and oriented to person, place, and time.      Cranial Nerves: No cranial nerve deficit.     Psychiatric:         Mood and Affect: Mood normal.         "

## 2025-06-27 NOTE — ASSESSMENT & PLAN NOTE
BP borderline hi diastolic  Encouraged compliance w meds/diet  Avoid nsaid/decongestant use  Liit sodiumcaffeine

## 2025-06-29 RX ORDER — LIRAGLUTIDE 6 MG/ML
INJECTION SUBCUTANEOUS
Qty: 9 ML | OUTPATIENT
Start: 2025-06-29 | End: 2025-08-02

## 2025-07-10 ENCOUNTER — TELEPHONE (OUTPATIENT)
Age: 65
End: 2025-07-10

## 2025-07-10 NOTE — TELEPHONE ENCOUNTER
Patient and wife called in stating he has been taking Metformin for about 2 weeks.  His numbers the last 4 days -daily:  186  176  155  166  Change of diet and exercise also.  No further action.

## 2025-07-10 NOTE — TELEPHONE ENCOUNTER
Spoke to patient, he is only taking metformin, reducing carbs and increasing exercise, patient stated he was fasting during the readings.

## 2025-07-10 NOTE — TELEPHONE ENCOUNTER
Please find out if he ever filled the Januvia as well or if only taking the Metformin.    Also find out if the readings listed are fasting or after eating--    thanks

## 2025-07-10 NOTE — TELEPHONE ENCOUNTER
Please inform fasting levels should be --would advise also starting the Januvia along with the metformin.  thanks

## 2025-08-04 ENCOUNTER — OFFICE VISIT (OUTPATIENT)
Dept: FAMILY MEDICINE CLINIC | Facility: CLINIC | Age: 65
End: 2025-08-04
Payer: COMMERCIAL

## 2025-08-04 VITALS
TEMPERATURE: 98.3 F | HEIGHT: 70 IN | DIASTOLIC BLOOD PRESSURE: 82 MMHG | SYSTOLIC BLOOD PRESSURE: 124 MMHG | HEART RATE: 90 BPM | RESPIRATION RATE: 16 BRPM | OXYGEN SATURATION: 99 % | WEIGHT: 230 LBS | BODY MASS INDEX: 32.93 KG/M2

## 2025-08-04 DIAGNOSIS — T63.441A BEE STING REACTION, ACCIDENTAL OR UNINTENTIONAL, INITIAL ENCOUNTER: Primary | ICD-10-CM

## 2025-08-04 PROCEDURE — 99213 OFFICE O/P EST LOW 20 MIN: CPT | Performed by: FAMILY MEDICINE

## 2025-08-21 DIAGNOSIS — I10 ESSENTIAL HYPERTENSION: ICD-10-CM

## 2025-08-21 DIAGNOSIS — E11.9 TYPE 2 DIABETES MELLITUS WITHOUT COMPLICATION, WITHOUT LONG-TERM CURRENT USE OF INSULIN (HCC): ICD-10-CM

## 2025-08-22 RX ORDER — LISINOPRIL 2.5 MG/1
2.5 TABLET ORAL DAILY
Qty: 90 TABLET | Refills: 1 | Status: SHIPPED | OUTPATIENT
Start: 2025-08-22

## 2025-08-22 RX ORDER — AMLODIPINE BESYLATE 5 MG/1
5 TABLET ORAL DAILY
Qty: 90 TABLET | Refills: 1 | Status: SHIPPED | OUTPATIENT
Start: 2025-08-22